# Patient Record
Sex: FEMALE | Race: WHITE
[De-identification: names, ages, dates, MRNs, and addresses within clinical notes are randomized per-mention and may not be internally consistent; named-entity substitution may affect disease eponyms.]

---

## 2019-03-03 ENCOUNTER — HOSPITAL ENCOUNTER (EMERGENCY)
Dept: HOSPITAL 75 - ER | Age: 59
Discharge: ANOTHER HOSPITAL | End: 2019-03-03
Payer: MEDICARE

## 2019-03-03 VITALS — WEIGHT: 156 LBS | BODY MASS INDEX: 25.07 KG/M2 | HEIGHT: 66 IN

## 2019-03-03 VITALS — DIASTOLIC BLOOD PRESSURE: 73 MMHG | SYSTOLIC BLOOD PRESSURE: 178 MMHG

## 2019-03-03 DIAGNOSIS — Z87.891: ICD-10-CM

## 2019-03-03 DIAGNOSIS — I50.9: ICD-10-CM

## 2019-03-03 DIAGNOSIS — N18.6: ICD-10-CM

## 2019-03-03 DIAGNOSIS — Z88.0: ICD-10-CM

## 2019-03-03 DIAGNOSIS — Z99.2: ICD-10-CM

## 2019-03-03 DIAGNOSIS — J18.9: Primary | ICD-10-CM

## 2019-03-03 LAB
ALBUMIN SERPL-MCNC: 4.4 GM/DL (ref 3.2–4.5)
ALP SERPL-CCNC: 81 U/L (ref 40–136)
ALT SERPL-CCNC: 6 U/L (ref 0–55)
APTT BLD: 30 SEC (ref 24–35)
ARTERIAL PATENCY WRIST A: POSITIVE
BASE EXCESS STD BLDA CALC-SCNC: 2.9 MMOL/L (ref -2.5–2.5)
BASOPHILS # BLD AUTO: 0 10^3/UL (ref 0–0.1)
BASOPHILS NFR BLD AUTO: 0 % (ref 0–10)
BDY SITE: (no result)
BILIRUB SERPL-MCNC: 0.6 MG/DL (ref 0.1–1)
BODY TEMPERATURE: 100.6
BUN/CREAT SERPL: 5
CALCIUM SERPL-MCNC: 10 MG/DL (ref 8.5–10.1)
CHLORIDE SERPL-SCNC: 99 MMOL/L (ref 98–107)
CO2 BLDA CALC-SCNC: 28.3 MMOL/L (ref 21–31)
CO2 SERPL-SCNC: 24 MMOL/L (ref 21–32)
CREAT SERPL-MCNC: 8.02 MG/DL (ref 0.6–1.3)
EOSINOPHIL # BLD AUTO: 0.1 10^3/UL (ref 0–0.3)
EOSINOPHIL NFR BLD AUTO: 1 % (ref 0–10)
EOSINOPHIL NFR BLD MANUAL: 3 %
ERYTHROCYTE [DISTWIDTH] IN BLOOD BY AUTOMATED COUNT: 14.9 % (ref 10–14.5)
GFR SERPLBLD BASED ON 1.73 SQ M-ARVRAT: 5 ML/MIN
GLUCOSE SERPL-MCNC: 160 MG/DL (ref 70–105)
HCT VFR BLD CALC: 33 % (ref 35–52)
HGB BLD-MCNC: 10.9 G/DL (ref 11.5–16)
INHALED O2 FLOW RATE: (no result) L/MIN
INR PPP: 1.1 (ref 0.8–1.4)
LYMPHOCYTES # BLD AUTO: 0.7 X 10^3 (ref 1–4)
LYMPHOCYTES NFR BLD AUTO: 7 % (ref 12–44)
MANUAL DIFFERENTIAL PERFORMED BLD QL: YES
MCH RBC QN AUTO: 31 PG (ref 25–34)
MCHC RBC AUTO-ENTMCNC: 33 G/DL (ref 32–36)
MCV RBC AUTO: 92 FL (ref 80–99)
MONOCYTES # BLD AUTO: 0.8 X 10^3 (ref 0–1)
MONOCYTES NFR BLD AUTO: 8 % (ref 0–12)
MONOCYTES NFR BLD: 5 %
NEUTROPHILS # BLD AUTO: 9.1 X 10^3 (ref 1.8–7.8)
NEUTROPHILS NFR BLD AUTO: 85 % (ref 42–75)
NEUTS BAND NFR BLD MANUAL: 88 %
PCO2 BLDA: 45 MMHG (ref 35–45)
PH BLDA: 7.4 [PH] (ref 7.37–7.43)
PLATELET # BLD: 156 10^3/UL (ref 130–400)
PMV BLD AUTO: 11.2 FL (ref 7.4–10.4)
PO2 BLDA: 71 MMHG (ref 79–93)
POTASSIUM SERPL-SCNC: 3.7 MMOL/L (ref 3.6–5)
PROT SERPL-MCNC: 8.1 GM/DL (ref 6.4–8.2)
PROTHROMBIN TIME: 14.7 SEC (ref 12.2–14.7)
SAO2 % BLDA FROM PO2: 95 % (ref 94–100)
SODIUM SERPL-SCNC: 141 MMOL/L (ref 135–145)
VARIANT LYMPHS NFR BLD MANUAL: 4 %
VENTILATION MODE VENT: NO
WBC # BLD AUTO: 10.7 10^3/UL (ref 4.3–11)

## 2019-03-03 PROCEDURE — 80053 COMPREHEN METABOLIC PANEL: CPT

## 2019-03-03 PROCEDURE — 94640 AIRWAY INHALATION TREATMENT: CPT

## 2019-03-03 PROCEDURE — 85007 BL SMEAR W/DIFF WBC COUNT: CPT

## 2019-03-03 PROCEDURE — 85730 THROMBOPLASTIN TIME PARTIAL: CPT

## 2019-03-03 PROCEDURE — 87804 INFLUENZA ASSAY W/OPTIC: CPT

## 2019-03-03 PROCEDURE — 84484 ASSAY OF TROPONIN QUANT: CPT

## 2019-03-03 PROCEDURE — 85027 COMPLETE CBC AUTOMATED: CPT

## 2019-03-03 PROCEDURE — 87430 STREP A AG IA: CPT

## 2019-03-03 PROCEDURE — 82805 BLOOD GASES W/O2 SATURATION: CPT

## 2019-03-03 PROCEDURE — 36415 COLL VENOUS BLD VENIPUNCTURE: CPT

## 2019-03-03 PROCEDURE — 87040 BLOOD CULTURE FOR BACTERIA: CPT

## 2019-03-03 PROCEDURE — 85610 PROTHROMBIN TIME: CPT

## 2019-03-03 PROCEDURE — 83605 ASSAY OF LACTIC ACID: CPT

## 2019-03-03 PROCEDURE — 93005 ELECTROCARDIOGRAM TRACING: CPT

## 2019-03-03 PROCEDURE — 36600 WITHDRAWAL OF ARTERIAL BLOOD: CPT

## 2019-03-03 PROCEDURE — 71045 X-RAY EXAM CHEST 1 VIEW: CPT

## 2019-03-03 NOTE — XMS REPORT
[facility], [facility].

 Created on: 2015



Ingrid Riojas

External Reference #: [id number]

: 1960

Sex: Female



Demographics







 Address  [address] [place]  [address]

 

 Home Phone  [phone]

 

 Preferred Language  Unknown

 

 Marital Status  Unknown

 

 [employer] Affiliation  Unknown

 

 Race  White

 

 Ethnic Group  Not  or 





Author







 Author  Kimi Corrales

 

 [facility]  [employer]

 

 Address  Unknown

 

 Phone  Unavailable







Care Team Providers







 Care Team Member Name  Role  Phone

 

 Kimi Corrales    Unavailable



                                                                



Allergies

          No Known Allergies                                                   
                                     



Problems

          





 Problem Type  Condition  ICD-9 Code  Onset Dates  Condition Status

 

 Problem  Diabetes Mellitus Type 2, not stated as uncontrolled  250.00     
Active

 

 Problem  Hypertension, benign  401.1     Active

 

 Problem  Hypertensive retinopathy  362.11     Active



                                                                               
                             



Medications

          No Known Medications                                                 
                                                 



Vital Signs

          





 Date/Time:  Dec 17, 2014

 

 BMI  27.10 Index

 

 Height  66.5 inches

 

 Weight  170.5 lbs

 

 Respiratory Rate  16 per Minute

 

 Blood Pressure Diastolic  82 mm Hg

 

 Blood Pressure Systolic  166 mm Hg

 

 Cardiac Monitoring Heart Rate  96 Beats per Minute



                                                                              



Results

          No Known Results                                                     
               



Summary Purpose

          eClinicalWorks Submission

## 2019-03-03 NOTE — NUR
Pt to take home PO losartan 50mg tab at this time per Dr. Hargrove. This RN 
witnessed pt taking #1 50mg losartan tab at this time.

## 2019-03-03 NOTE — XMS REPORT
[facility], [facility].

 Created on: 2014



Ingrid Riojas

External Reference #: [id number]

: 1960

Sex: Female



Demographics







 Address  [address]  [address]

 

 Home Phone  [phone]

 

 Preferred Language  Unknown

 

 Marital Status  Unknown

 

 Synagogue Affiliation  Unknown

 

 Race  White

 

 Ethnic Group  Not  or 





Author







 Author  Ann Saleh

 

 Organization  [employer]

 

 Address  Unknown

 

 Phone  Unavailable







Care Team Providers







 Care Team Member Name  Role  Phone

 

 Ann Saleh  CP  Unavailable



                                                                



Allergies

          No Known Allergies                                                   
                                     



Problems

          





 Problem Type  Condition  ICD-9 Code  Onset Dates  Condition Status

 

 Problem  Hypertension, benign  401.1     Active

 

 Problem  Diabetes Mellitus Type 2, not stated as uncontrolled  250.00     
Active



                                                                               
                   



Medications

          No Known Medications                                                 
                                                 



Vital Signs

          





 Date/Time:  2014

 

 Height  66.5 inches

 

 Weight  158.8 lbs

 

 Temperature  98.3 F

 

 Blood Pressure Diastolic  72 mm Hg

 

 Blood Pressure Systolic  160 mm Hg

 

 Cardiac Monitoring Heart Rate  112 Beats per Minute

 

 BMI  25.24 Index

 

 Respiratory Rate  16 per Minute



                                                                              



Results

          No Known Results                                                     
               



Summary Purpose

          eClinicalWorks Submission

## 2019-03-03 NOTE — XMS REPORT
[facility], [facility].

 Created on: 2015



Ingrid Riojas

External Reference #: [id number]

: 1960

Sex: Female



Demographics







 Address  [address]  [address]

 

 Home Phone  [phone]

 

 Preferred Language  Unknown

 

 Marital Status  Unknown

 

 [employer] Affiliation  Unknown

 

 Race  White

 

 Ethnic Group  Not  or 





Author







 Author  Kimi Corrales

 

 [facility]  [employer]

 

 Address  Unknown

 

 Phone  Unavailable







Care Team Providers







 Care Team Member Name  Role  Phone

 

 Kimi Corrales    Unavailable



                                                                



Allergies

          No Known Allergies                                                   
                                     



Problems

          





 Problem Type  Condition  ICD-9 Code  Onset Dates  Condition Status

 

 Problem  Diabetes Mellitus Type 2, not stated as uncontrolled  250.00     
Active

 

 Problem  Hypertension, benign  401.1     Active

 

 Problem  Hypertensive retinopathy  362.11     Active



                                                                               
                             



Medications

          





 Medication  Code System  Code  Instructions  Start Date  End Date  Status  
Dosage

 

 Bactrim DS  NDC  [address]-36  800-160 MG Orally Twice a day  Dec 23, 2014  
Dec 28, 2014  Active  1 tablet



                                                                               
                   



Vital Signs

          





 Date/Time:  Dec 17, 2014

 

 BMI  27.10 Index

 

 Height  66.5 inches

 

 Weight  170.5 lbs

 

 Respiratory Rate  16 per Minute

 

 Blood Pressure Diastolic  82 mm Hg

 

 Blood Pressure Systolic  166 mm Hg

 

 Cardiac Monitoring Heart Rate  96 Beats per Minute



                                                                              



Results

          No Known Results                                                     
               



Summary Purpose

          eClinicalWorks Submission

## 2019-03-03 NOTE — XMS REPORT
[facility], [facility].

 Created on: 2015



Ingrid Riojas

External Reference #: [id number]

: 1960

Sex: Female



Demographics







 Address  [address]  [address]

 

 Home Phone  [phone]

 

 Preferred Language  Unknown

 

 Marital Status  Unknown

 

 Amish Affiliation  Unknown

 

 Race  White

 

 Ethnic Group  Not  or 





Author







 Author  Kimi Corrales

 

 [facility]  [employer]

 

 Address  Unknown

 

 Phone  Unavailable







Care Team Providers







 Care Team Member Name  Role  Phone

 

 Kimi Corrales    Unavailable



                                                                



Allergies

          No Known Allergies                                                   
                                     



Problems

          





 Problem Type  Condition  ICD-9 Code  Onset Dates  Condition Status

 

 Problem  Diabetes Mellitus Type 2, not stated as uncontrolled  250.00     
Active

 

 Problem  Hypertension, benign  401.1     Active

 

 Problem  Hypertensive retinopathy  362.11     Active



                                                                               
                             



Medications

          No Known Medications                                                 
                                                 



Vital Signs

          





 Date/Time:  2015

 

 Height  66.5 inches

 

 Weight  146.5 lbs

 

 Temperature  98.5 F

 

 Blood Pressure Diastolic  80, sittin mm Hg

 

 Blood Pressure Systolic  supine:160 mm Hg

 

 Cardiac Monitoring Heart Rate  80 Beats per Minute

 

 BMI  23.29 Index

 

 Respiratory Rate  16 per Minute



                                                                              



Results

          No Known Results                                                     
               



Summary Purpose

          eClinicalWorks Submission

## 2019-03-03 NOTE — XMS REPORT
[facility], [facility].

 Created on: 10/20/2014



Ingrid Riojas

External Reference #: [id number]

: 1970

Sex: Female



Demographics







 Address  [address] [place]  [address]

 

 Home Phone  [phone]

 

 Preferred Language  Unknown

 

 Marital Status  Unknown

 

 Protestant Affiliation  Unknown

 

 Race  White

 

 Ethnic Group  Not  or 





Author







 Author  Ann Saleh

 

 Organization  [employer]

 

 Address  Unknown

 

 Phone  Unavailable







Care Team Providers







 Care Team Member Name  Role  Phone

 

 Ann Saleh  CP  Unavailable



                                                                



Allergies, Adverse Reactions, Alerts

          





 Substance  Reaction  Event Type

 

 Penicillin  Info Not Available  Drug Allergy



                                                                               
         



Problems

          





 Problem Type  Condition  ICD-9 Code  Onset Dates  Condition Status

 

 Assessment  Conjunctivitis  372.00     Active

 

 Assessment  Urinary tract infection, site not specified  599.0     Active



                                                                               
                   



Medications

          





 Medication  Code System  Code  Instructions  Start Date  End Date  Status  
Dosage

 

 Sulfamethoxazole-TMP DS  NDC  [address]-13  800-160 MG Orally Twice a day  
Oct 10, 2014  Oct 15, 2014  Active  1 tablet

 

 Acetaminophen  NDC  [address]-11  500 MG Orally prn        Active  1 capsule 
as needed

 

 Gentamicin Sulfate  NDC  [address]-70  0.3 % Ophthalmic every 4 hrs  Oct 10, 
2014     Active  1 drop into affected eye



                                                                               
                             



Procedures

          





 Procedure  Coding System  Code  Date

 

 OFFICE VISIT, NP-LOW COMPLEXITY (20 MIN.)  CPT-4  69081  Oct 10, 2014

 

 URINALYSIS, IN HOUSE  CPT-4  94780  Oct 10, 2014



                                                                               
                             



Vital Signs

          





 Date/Time:  Oct 10, 2014

 

 Height  66.5 inches

 

 Weight  158.5 lbs

 

 Temperature  99.1 F

 

 Blood Pressure Diastolic  78 mm Hg

 

 Blood Pressure Systolic  156 mm Hg

 

 Cardiac Monitoring Heart Rate  80 Beats per Minute

 

 BMI  25.20 Index

 

 Respiratory Rate  16 per Minute



                                                                              



Results

          No Known Results                                                     
               



Summary Purpose

          eClinicalWorks Submission

## 2019-03-03 NOTE — XMS REPORT
[facility] Continuity Of Care Document

 Created on: 2017



KIMBERLY KINCAID

External Reference #: [id number]

: 1960

Sex: Female



Demographics







 Address  [address] [place]  [address]

 

 Home Phone  [phone]

 

 Preferred Language  Unknown

 

 Marital Status  Never 

 

 Sikh Affiliation  Unknown

 

 Race  White

 

 Ethnic Group  Unknown





Author







 Author  [facility]

 

 Organization  [facility]

 

 Address  [address] [place]  [address]



 

 Phone  [phone]







Support







 Name  Relationship  Address  Phone

 

 OLIVIA KINCAID  Next Of Kin  [address] [place]  [address]  [phone]

 

 OLIVIA KINCAID  ECON  [address] [place]  [address]  [phone]







Care Team Providers







 Care Team Member Name  Role  Phone

 

 AMARI OBRIEN, LEWIS NORMAN  AT  [phone]

 

 UNASSIGNED, ED PHYSICIAN  Unavailable  Unavailable

 

 DOROTHY OBRIEN, PELON  CP  [phone]

 

 SILVA STEPHENS  PP  [phone]

 

 ADIS OBRIEN, RANULFO LUQUE  CP  [phone]

 

 CESARIO OBRIEN, CORINNA [place]  AT  [phone]







Results







 Lab Results 

 

 Visit/Account #[id number] (2017 6:04pm - 2017 6:54pm) 

 

 Test  Result  Date/Time

 

 POCGL 

 

 POCGL( MG/DL)

  90 MG/DL

  2017 6:19am











 68 MG/DL

  2017 11:15am



 

 92 MG/DL

  2017 11:59am



 

 82 MG/DL

  2017 5:51pm



 

 82 MG/DL

  2017 8:28pm



 

 90 MG/DL

  April 3, 2017 5:21am



 

 165 MG/DL

  April 3, 2017 10:58am



 

 215 MG/DL

  April 3, 2017 4:25pm



 

 212 MG/DL

  April 3, 2017 9:37pm



 

 154 MG/DL

  2017 5:03am



 

 115 MG/DL

  2017 10:06am



 

 152 MG/DL

  2017 4:51pm



 

 222 MG/DL

  2017 8:25pm



 

 93 MG/DL

  2017 6:21am



 

 157 MG/DL

  2017 10:47am



 

 147 MG/DL

  2017 4:46pm











 HEMOGLOBIN AND HEMATOCRIT 

 

 HEMOGLOBIN(12.0-16.0 G/DL)

  6.3 G/DL

  2017 11:59pm











 8.9 G/DL

  2017 12:08pm



 

 10.2 G/DL

  2017 6:09pm



 

 9.1 G/DL

  April 3, 2017 12:28am



 

 9.2 G/DL

  April 3, 2017 6:01am



 

 8.8 G/DL

  2017 5:10pm











 HEMATOCRIT(36.0-46.0 %)

  20.1 %

  2017 11:59pm











 28.2 %

  2017 12:08pm



 

 32.1 %

  2017 6:09pm



 

 28.6 %

  April 3, 2017 12:28am



 

 28.7 %

  April 3, 2017 6:01am



 

 28.1 %

  2017 5:10pm











 COMPLETE BLOOD COUNT WITH DIFF 

 

 WHITE BLOOD COUNT(4.0-11.0 10E3/UL)

  8.0 10E3/UL

  2017 6:07pm











 6.4 10E3/UL

  2017 6:19am



 

 6.4 10E3/UL

  2017 7:02am



 

 7.1 10E3/UL

  2017 7:18am











 RED BLOOD COUNT(4.00-5.20 10E6/UL)

  2.49 10E6/UL

  2017 6:07pm











 3.26 10E6/UL

  2017 6:19am



 

 2.91 10E6/UL

  2017 7:02am



 

 2.99 10E6/UL

  2017 7:18am











 HEMOGLOBIN(12.0-16.0 G/DL)

  7.4 G/DL

  2017 6:07pm











 9.5 G/DL

  2017 6:19am



 

 8.6 G/DL

  2017 7:02am



 

 8.7 G/DL

  2017 7:18am











 HEMATOCRIT(36.0-46.0 %)

  24.3 %

  2017 6:07pm











 29.8 %

  2017 6:19am



 

 26.8 %

  2017 7:02am



 

 27.4 %

  2017 7:18am











 MEAN CORPUSCULAR VOLUME(82.0-100.0 FL)

  97.6 FL

  2017 6:07pm











 91.4 FL

  2017 6:19am



 

 92.1 FL

  2017 7:02am



 

 91.6 FL

  2017 7:18am











 MEAN CORPUSCULAR HEMOGLOBIN(26.0-34.0 PG)

  29.7 PG

  2017 6:07pm











 29.1 PG

  2017 6:19am



 

 29.6 PG

  2017 7:02am



 

 29.1 PG

  2017 7:18am











 MEAN CORPUSCULAR HGB CONC(31.5-36.5 G/DL)

  30.5 G/DL

  2017 6:07pm











 31.9 G/DL

  2017 6:19am



 

 32.1 G/DL

  2017 7:02am



 

 31.8 G/DL

  2017 7:18am











 RED CELL DISTRIBUTION WIDTH(11.5-14.5 %)

  12.8 %

  2017 6:07pm











 14.1 %

  2017 6:19am



 

 13.9 %

  2017 7:02am



 

 13.7 %

  2017 7:18am











 777-3: PLATELET COUNT(150-450 10E3/UL)

  233 10E3/UL

  2017 6:07pm











 212 10E3/UL

  2017 6:19am



 

 203 10E3/UL

  2017 7:02am



 

 198 10E3/UL

  2017 7:18am











 MEAN PLATELET VOLUME(8.2-12.4 FL)

  10.4 FL

  2017 6:07pm











 10.5 FL

  2017 6:19am



 

 10.6 FL

  2017 7:02am



 

 10.3 FL

  2017 7:18am











 NEUTROPHILS % (AUTO)(40-70 %)

  62 %

  2017 6:07pm











 55 %

  2017 6:19am



 

 54 %

  2017 7:02am



 

 52 %

  2017 7:18am











 LYMPHOCYTES % (AUTO)(15-45 %)

  26 %

  2017 6:07pm











 30 %

  2017 6:19am



 

 32 %

  2017 7:02am



 

 33 %

  2017 7:18am











 MONOCYTES % (AUTO)(2-10 %)

  8 %

  2017 6:07pm











 9 %

  2017 6:19am



 

 10 %

  2017 7:02am



 

 11 %

  2017 7:18am











 EOSINOPHILS % (AUTO)(0-6 %)

  4 %

  2017 6:07pm











 5 %

  2017 6:19am



 

 3 %

  2017 7:02am



 

 4 %

  2017 7:18am











 BASOPHILS % (AUTO)(0-1 %)

  0 %

  2017 6:07pm











 1 %

  2017 6:19am



 

 0 %

  2017 7:02am



 

 0 %

  2017 7:18am











 IMMATURE GRANS % (AUTO)(0-0 %)

  0 %

  2017 6:07pm











 1 %

  2017 6:19am



 

 1 %

  2017 7:02am



 

 0 %

  2017 7:18am











 NUCLEATED RBCS (AUTO)(0-0 %)

  0 %

  2017 6:07pm











 0 %

  2017 6:19am



 

 0 %

  2017 7:02am



 

 0 %

  2017 7:18am











 NEUTROPHILS # (AUTO)(2.5-7.5 10E3/UL)

  5.0 10E3/UL

  2017 6:07pm











 3.5 10E3/UL

  2017 6:19am



 

 3.5 10E3/UL

  2017 7:02am



 

 3.7 10E3/UL

  2017 7:18am











 LYMPHOCYTES # (AUTO)(1.0-4.0 10E3/UL)

  2.1 10E3/UL

  2017 6:07pm











 1.9 10E3/UL

  2017 6:19am



 

 2.0 10E3/UL

  2017 7:02am



 

 2.3 10E3/UL

  2017 7:18am











 MONOCYTES # (AUTO)(0.2-0.8 10E3/UL)

  0.6 10E3/UL

  2017 6:07pm











 0.6 10E3/UL

  2017 6:19am



 

 0.7 10E3/UL

  2017 7:02am



 

 0.8 10E3/UL

  2017 7:18am











 EOSINOPHILS # (AUTO)(0.0-0.4 10E3/UL)

  0.3 10E3/UL

  2017 6:07pm











 0.3 10E3/UL

  2017 6:19am



 

 0.2 10E3/UL

  2017 7:02am



 

 0.3 10E3/UL

  2017 7:18am











 BASOPHILS # (AUTO)(0.0-0.2 10E3/UL)

  0.0 10E3/UL

  2017 6:07pm











 0.0 10E3/UL

  2017 6:19am



 

 0.0 10E3/UL

  2017 7:02am



 

 0.0 10E3/UL

  2017 7:18am











 IMMATURE GRANS # (AUTO)(0.0-0.0 10E3/UL)

  0.0 10E3/UL

  2017 6:07pm











 0.0 10E3/UL

  2017 6:19am



 

 0.0 10E3/UL

  2017 7:02am



 

 0.0 10E3/UL

  2017 7:18am











 DIFF TYPE 

  AUTOMATED

  2017 6:07pm











 AUTOMATED

  2017 6:19am



 

 AUTOMATED

  2017 7:02am



 

 AUTOMATED

  2017 7:18am











 ERYTHROCYTE SEDIMENTATION RATE 

 

 ERYTHROCYTE SEDIMENTATION RATE(0-20 MM/HR)

  51 MM/HR

  2017 7:02am



 

 PROTHROMBIN TIME WITH INR 

 

 PROTHROMBIN TIME(12.1-14.0 SEC)

  13.8 SEC

  2017 6:07pm











 13.2 SEC

  2017 7:02am











 31293-0: INR 

  1.06



Result Comments:



       

                INR reference interval applies to patients   

                   on anticoagulant therapy. Suggested INR   

                   therapeutic range for oral anticoagulant  

                   therapy: (Stabilized anticoagulated       

                   patients)  

  

                Routine Therapy:                  2.0 to 3.0  

                Recurrent Myocardial Infarction:  2.5 to 3.5  

                Mechanical Prosthetic Valves:     2.5 to 3.5

  2017 6:07pm











 1.00



Result Comments:



       

                INR reference interval applies to patients   

                   on anticoagulant therapy. Suggested INR   

                   therapeutic range for oral anticoagulant  

                   therapy: (Stabilized anticoagulated       

                   patients)  

  

                Routine Therapy:                  2.0 to 3.0  

                Recurrent Myocardial Infarction:  2.5 to 3.5  

                Mechanical Prosthetic Valves:     2.5 to 3.5

  2017 7:02am











 PARTIAL THROMBOPLASTIN TIME 

 

 PARTIAL THROMBOPLASTIN TIME(22.2-37.4 SEC)

  30.7 SEC

  2017 6:07pm











 23.5 SEC

  2017 7:02am











 UA WITH SCREEN FOR CULTURE 

 

 COLOR,URINE 

  LIGHT YELLOW

  2017 8:09pm



 

 CLARITY,URINE 

  CLEAR

  2017 8:09pm



 

 GLUCOSE, URINE(NEGATIVE MG/DL)

  100 MG/DL

  2017 8:09pm



 

 URINE BILIRUBIN(NEGATIVE)

  NEGATIVE

  2017 8:09pm



 

 KETONES,URINE(NEGATIVE MG/DL)

  NEGATIVE MG/DL

  2017 8:09pm



 

 URINE SPECIFIC GRAVITY(1.001-1.035)

  1.013

  2017 8:09pm



 

 URINE BLOOD(NEGATIVE)

  SMALL

  2017 8:09pm



 

 URINE PH(5.0-9.0)

  6.0

  2017 8:09pm



 

 URINE PROTEIN(Less than 20 MG/DL)

  Greater than 2000 MG/DL

  2017 8:09pm



 

 URINE UROBILINOGEN(0.2-1.0 MG/DL)

  0.2-1.0 MG/DL

  2017 8:09pm



 

 URINE NITRITE(NEGATIVE)

  NEGATIVE

  2017 8:09pm



 

 LEUKOCYTE ESTERASE ,URINE(NEGATIVE)

  NEGATIVE

  2017 8:09pm



 

 URINE RBCS(0-3 /HPF)

  8-12 /HPF

  2017 8:09pm



 

 URINE WBCS(0-3 /HPF)

  4-7 /HPF

  2017 8:09pm



 

 URINE EPITHELIAL CELLS(0-3 /HPF)

  8-12 /HPF

  2017 8:09pm



 

 URINE HYALINE CASTS(0-3 /LPF)

  4-7 /LPF

  2017 8:09pm



 

 URINE BACTERIA(NEGATIVE /HPF)

  1+ /HPF

  2017 8:09pm



 

 URINE CULTURE 

  NOT INDICATED

  2017 8:09pm



 

 URINE MICROSCOPIC REQUIRED 

  YES

  2017 8:09pm



 

 MICROALBUMIN/CREATININE RATIO 

 

 URINE CREATININE, RANDOM(MG/DL)

  50.13 MG/DL

  2017 6:05pm



 

 MICROALBUMIN RANDOM(0.0-1.8 MG/DL)

  267.0 MG/DL

  2017 6:05pm



 

 MICROALBUMIN/CRET RATIO(0.0-29.9 UG/MG)

  5326.2 UG/MG

  2017 6:05pm



 

 COMPLETE METABOLIC PROFILE 

 

 GLUCOSE( MG/DL)

  122 MG/DL

  2017 6:07pm











 95 MG/DL

  2017 6:19am



 

 138 MG/DL

  2017 7:02am



 

 89 MG/DL

  2017 7:18am











 BLOOD UREA NITROGEN(6-20 MG/DL)

  56 MG/DL

  2017 6:07pm











 54 MG/DL

  2017 6:19am



 

 54 MG/DL

  2017 7:02am



 

 55 MG/DL

  2017 7:18am











 CREATININE(0.50-1.20 MG/DL)

  3.74 MG/DL

  2017 6:07pm











 3.61 MG/DL

  2017 6:19am



 

 3.97 MG/DL

  2017 7:02am



 

 4.03 MG/DL

  2017 7:18am











 EST GLOMERULAR FILTRATION RATE(Greater than or equal to 60)

  12



Result Comments:



If the patient is of -American descent/extraction 

multiply the eGFR value by 1.212 to obtain the actual eGFR.  

  

>=60  mg/dL    Normal  

 30-59 mg/dL    Moderate Kidney Disease  

 15-29 mg/dL    Severe Kidney Disease  

<15   mg/dL    Kidney Failure

  2017 6:07pm











 13



Result Comments:



If the patient is of -American descent/extraction 

multiply the eGFR value by 1.212 to obtain the actual eGFR.  

  

>=60  mg/dL    Normal  

 30-59 mg/dL    Moderate Kidney Disease  

 15-29 mg/dL    Severe Kidney Disease  

<15   mg/dL    Kidney Failure

  2017 6:19am



 

 12



Result Comments:



If the patient is of -American descent/extraction 

multiply the eGFR value by 1.212 to obtain the actual eGFR.  

  

>=60  mg/dL    Normal  

 30-59 mg/dL    Moderate Kidney Disease  

 15-29 mg/dL    Severe Kidney Disease  

<15   mg/dL    Kidney Failure

  2017 7:02am



 

 11



Result Comments:



If the patient is of -American descent/extraction 

multiply the eGFR value by 1.212 to obtain the actual eGFR.  

  

>=60  mg/dL    Normal  

 30-59 mg/dL    Moderate Kidney Disease  

 15-29 mg/dL    Severe Kidney Disease  

<15   mg/dL    Kidney Failure

  2017 7:18am











 BUN CREATININE RATIO(10.0-20.0 RATIO)

  15.0 RATIO

  2017 6:07pm











 15.0 RATIO

  2017 6:19am



 

 14.0 RATIO

  2017 7:02am



 

 14.0 RATIO

  2017 7:18am











 SODIUM(135-145 MMOL/L)

  139 MMOL/L

  2017 6:07pm











 139 MMOL/L

  2017 6:19am



 

 137 MMOL/L

  2017 7:02am



 

 139 MMOL/L

  2017 7:18am











 POTASSIUM(3.6-5.0 MMOL/L)

  6.2 MMOL/L

  2017 6:07pm











 5.8 MMOL/L

  2017 6:19am



 

 5.3 MMOL/L

  2017 7:02am



 

 5.1 MMOL/L

  2017 7:18am











 CHLORIDE(101-111 MMOL/L)

  116 MMOL/L

  2017 6:07pm











 112 MMOL/L

  2017 6:19am



 

 110 MMOL/L

  2017 7:02am



 

 111 MMOL/L

  2017 7:18am











 CO2(21-31 MMOL/L)

  18 MMOL/L

  2017 6:07pm











 18 MMOL/L

  2017 6:19am



 

 20 MMOL/L

  2017 7:02am



 

 19 MMOL/L

  2017 7:18am











 ANION GAP(8-18)

  11

  2017 6:07pm











 15

  2017 6:19am



 

 12

  2017 7:02am



 

 14

  2017 7:18am











 OSMO CALCULATED(270.0-290.0)

  294.3

  2017 6:07pm











 292.1

  2017 6:19am



 

 290.8

  2017 7:02am



 

 292.1

  2017 7:18am











 CALCIUM(8.5-10.5 MG/DL)

  8.2 MG/DL

  2017 6:07pm











 8.4 MG/DL

  2017 6:19am



 

 8.0 MG/DL

  2017 7:02am



 

 8.0 MG/DL

  2017 7:18am











 BILIRUBIN,TOTAL(0.1-1.2 MG/DL)

  0.3 MG/DL

  2017 6:07pm











 0.7 MG/DL

  2017 6:19am



 

 0.4 MG/DL

  2017 7:02am



 

 0.4 MG/DL

  2017 7:18am











 ALKALINE PHOSPHATASE( IU/L)

  151 IU/L

  2017 6:07pm











 138 IU/L

  2017 6:19am



 

 96 IU/L

  2017 7:02am



 

 87 IU/L

  2017 7:18am











 ASPARTATE AMINO TRANSFERASE(10-42 IU/L)

  20 IU/L

  2017 6:07pm











 14 IU/L

  2017 6:19am



 

 10 IU/L

  2017 7:02am



 

 9 IU/L

  2017 7:18am











 ALANINE AMINOTRANSFERASE(10-60 IU/L)

  29 IU/L

  2017 6:07pm











 24 IU/L

  2017 6:19am



 

 13 IU/L

  2017 7:02am



 

 12 IU/L

  2017 7:18am











 TOTAL PROTEIN(6.4-8.2 G/DL)

  6.9 G/DL

  2017 6:07pm











 6.5 G/DL

  2017 6:19am



 

 5.9 G/DL

  2017 7:02am



 

 5.9 G/DL

  2017 7:18am











 ALBUMIN(3.5-5.5 G/DL)

  3.0 G/DL

  2017 6:07pm











 2.9 G/DL

  2017 6:19am



 

 2.7 G/DL

  2017 7:02am



 

 2.8 G/DL

  2017 7:18am











 GLOBULIN(2.4-3.6)

  3.9

  2017 6:07pm











 3.6

  2017 6:19am



 

 3.2

  2017 7:02am



 

 3.1

  2017 7:18am











 ALBUMIN/GLOBULIN RATIO(0.9-1.8 RATIO)

  0.8 RATIO

  2017 6:07pm











 0.8 RATIO

  2017 6:19am



 

 0.8 RATIO

  2017 7:02am



 

 0.9 RATIO

  2017 7:18am











 BASIC METABOLIC PANEL 

 

 GLUCOSE( MG/DL)

  106 MG/DL

  2017 8:01pm











 65 MG/DL

  2017 10:08pm



 

 217 MG/DL

  2017 2:22am



 

 60 MG/DL

  2017 9:54am



 

 99 MG/DL

  2017 1:52pm



 

 83 MG/DL

  2017 6:09pm



 

 196 MG/DL

  2017 9:47pm



 

 108 MG/DL

  April 3, 2017 12:28am



 

 90 MG/DL

  April 3, 2017 6:01am











 BLOOD UREA NITROGEN(6-20 MG/DL)

  56 MG/DL

  2017 8:01pm











 55 MG/DL

  2017 10:08pm



 

 55 MG/DL

  2017 2:22am



 

 54 MG/DL

  2017 9:54am



 

 56 MG/DL

  2017 1:52pm



 

 52 MG/DL

  2017 6:09pm



 

 53 MG/DL

  2017 9:47pm



 

 53 MG/DL

  April 3, 2017 12:28am



 

 51 MG/DL

  April 3, 2017 6:01am











 CREATININE(0.50-1.20 MG/DL)

  3.77 MG/DL

  2017 8:01pm











 3.71 MG/DL

  2017 10:08pm



 

 3.77 MG/DL

  2017 2:22am



 

 3.69 MG/DL

  2017 9:54am



 

 3.80 MG/DL

  2017 1:52pm



 

 3.73 MG/DL

  2017 6:09pm



 

 3.73 MG/DL

  2017 9:47pm



 

 3.68 MG/DL

  April 3, 2017 12:28am



 

 3.73 MG/DL

  April 3, 2017 6:01am











 EST GLOMERULAR FILTRATION RATE(Greater than or equal to 60)

  12



Result Comments:



If the patient is of -American descent/extraction 

multiply the eGFR value by 1.212 to obtain the actual eGFR.  

  

>=60  mg/dL    Normal  

 30-59 mg/dL    Moderate Kidney Disease  

 15-29 mg/dL    Severe Kidney Disease  

<15   mg/dL    Kidney Failure

  2017 8:01pm











 13



Result Comments:



If the patient is of -American descent/extraction 

multiply the eGFR value by 1.212 to obtain the actual eGFR.  

  

>=60  mg/dL    Normal  

 30-59 mg/dL    Moderate Kidney Disease  

 15-29 mg/dL    Severe Kidney Disease  

<15   mg/dL    Kidney Failure

  2017 10:08pm



 

 12



Result Comments:



If the patient is of -American descent/extraction 

multiply the eGFR value by 1.212 to obtain the actual eGFR.  

  

>=60  mg/dL    Normal  

 30-59 mg/dL    Moderate Kidney Disease  

 15-29 mg/dL    Severe Kidney Disease  

<15   mg/dL    Kidney Failure

  2017 2:22am



 

 13



Result Comments:



If the patient is of -American descent/extraction 

multiply the eGFR value by 1.212 to obtain the actual eGFR.  

  

>=60  mg/dL    Normal  

 30-59 mg/dL    Moderate Kidney Disease  

 15-29 mg/dL    Severe Kidney Disease  

<15   mg/dL    Kidney Failure

  2017 9:54am



 

 12



Result Comments:



If the patient is of -American descent/extraction 

multiply the eGFR value by 1.212 to obtain the actual eGFR.  

  

>=60  mg/dL    Normal  

 30-59 mg/dL    Moderate Kidney Disease  

 15-29 mg/dL    Severe Kidney Disease  

<15   mg/dL    Kidney Failure

  2017 1:52pm



 

 13



Result Comments:



If the patient is of -American descent/extraction 

multiply the eGFR value by 1.212 to obtain the actual eGFR.  

  

>=60  mg/dL    Normal  

 30-59 mg/dL    Moderate Kidney Disease  

 15-29 mg/dL    Severe Kidney Disease  

<15   mg/dL    Kidney Failure

  2017 6:09pm



 

 13



Result Comments:



If the patient is of -American descent/extraction 

multiply the eGFR value by 1.212 to obtain the actual eGFR.  

  

>=60  mg/dL    Normal  

 30-59 mg/dL    Moderate Kidney Disease  

 15-29 mg/dL    Severe Kidney Disease  

<15   mg/dL    Kidney Failure

  2017 9:47pm



 

 13



Result Comments:



If the patient is of -American descent/extraction 

multiply the eGFR value by 1.212 to obtain the actual eGFR.  

  

>=60  mg/dL    Normal  

 30-59 mg/dL    Moderate Kidney Disease  

 15-29 mg/dL    Severe Kidney Disease  

<15   mg/dL    Kidney Failure

  April 3, 2017 12:28am



 

 13



Result Comments:



If the patient is of -American descent/extraction 

multiply the eGFR value by 1.212 to obtain the actual eGFR.  

  

>=60  mg/dL    Normal  

 30-59 mg/dL    Moderate Kidney Disease  

 15-29 mg/dL    Severe Kidney Disease  

<15   mg/dL    Kidney Failure

  April 3, 2017 6:01am











 BUN CREATININE RATIO(10.0-20.0 RATIO)

  15.0 RATIO

  2017 8:01pm











 15.0 RATIO

  2017 10:08pm



 

 15.0 RATIO

  2017 2:22am



 

 15.0 RATIO

  2017 9:54am



 

 15.0 RATIO

  2017 1:52pm



 

 14.0 RATIO

  2017 6:09pm



 

 14.0 RATIO

  2017 9:47pm



 

 14.0 RATIO

  April 3, 2017 12:28am



 

 14.0 RATIO

  April 3, 2017 6:01am











 SODIUM(135-145 MMOL/L)

  140 MMOL/L

  2017 8:01pm











 141 MMOL/L

  2017 10:08pm



 

 139 MMOL/L

  2017 2:22am



 

 140 MMOL/L

  2017 9:54am



 

 139 MMOL/L

  2017 1:52pm



 

 139 MMOL/L

  2017 6:09pm



 

 137 MMOL/L

  2017 9:47pm



 

 137 MMOL/L

  April 3, 2017 12:28am



 

 139 MMOL/L

  April 3, 2017 6:01am











 POTASSIUM(3.6-5.0 MMOL/L)

  5.8 MMOL/L

  2017 8:01pm











 6.2 MMOL/L

  2017 10:08pm



 

 6.1 MMOL/L

  2017 2:22am



 

 5.8 MMOL/L

  2017 9:54am



 

 6.1 MMOL/L

  2017 1:52pm



 

 6.1 MMOL/L

  2017 6:09pm



 

 5.6 MMOL/L

  2017 9:47pm



 

 5.9 MMOL/L

  April 3, 2017 12:28am



 

 5.7 MMOL/L

  April 3, 2017 6:01am











 CHLORIDE(101-111 MMOL/L)

  114 MMOL/L

  2017 8:01pm











 118 MMOL/L

  2017 10:08pm



 

 116 MMOL/L

  2017 2:22am



 

 115 MMOL/L

  2017 9:54am



 

 112 MMOL/L

  2017 1:52pm



 

 109 MMOL/L

  2017 6:09pm



 

 110 MMOL/L

  2017 9:47pm



 

 112 MMOL/L

  April 3, 2017 12:28am



 

 111 MMOL/L

  April 3, 2017 6:01am











 CO2(21-31 MMOL/L)

  19 MMOL/L

  2017 8:01pm











 19 MMOL/L

  2017 10:08pm



 

 17 MMOL/L

  2017 2:22am



 

 18 MMOL/L

  2017 9:54am



 

 18 MMOL/L

  2017 1:52pm



 

 18 MMOL/L

  2017 6:09pm



 

 18 MMOL/L

  2017 9:47pm



 

 19 MMOL/L

  April 3, 2017 12:28am



 

 19 MMOL/L

  April 3, 2017 6:01am











 ANION GAP(8-18)

  13

  2017 8:01pm











 10

  2017 10:08pm



 

 12

  2017 2:22am



 

 13

  2017 9:54am



 

 15

  2017 1:52pm



 

 18

  2017 6:09pm



 

 15

  2017 9:47pm



 

 12

  April 3, 2017 12:28am



 

 15

  April 3, 2017 6:01am











 OSMO CALCULATED(270.0-290.0)

  295.3

  2017 8:01pm











 294.5

  2017 10:08pm



 

 299.2

  2017 2:22am



 

 292.0

  2017 9:54am



 

 293.0

  2017 1:52pm



 

 290.7

  2017 6:09pm



 

 293.6

  2017 9:47pm



 

 288.7

  April 3, 2017 12:28am



 

 290.8

  April 3, 2017 6:01am











 CALCIUM(8.5-10.5 MG/DL)

  8.5 MG/DL

  2017 8:01pm











 8.1 MG/DL

  2017 10:08pm



 

 8.2 MG/DL

  2017 2:22am



 

 8.3 MG/DL

  2017 9:54am



 

 8.3 MG/DL

  2017 1:52pm



 

 8.6 MG/DL

  2017 6:09pm



 

 8.5 MG/DL

  2017 9:47pm



 

 8.3 MG/DL

  April 3, 2017 12:28am



 

 8.4 MG/DL

  April 3, 2017 6:01am











 LACTATE DEHYDROGENASE 

 

 LACTATE DEHYDROGENASE( IU/L)

  182 IU/L

  2017 7:02am



 

 CARDIAC TROPONIN I 

 

 CARDIAC TROPONIN I(0.01-0.04 NG/ML)

  0.03 NG/ML



Result Comments:



REFERENCE RANGES:  

    NEGATIVE                             < 0.04 NG/ML  

    POSSIBLE MYCARDIAL INVOLVEMENT     >/=0.04 NG/ML   

  

INTERPRET TROPONIN I RESULT IN LIGHT OF THE TOTAL CLINICAL 

PRESENTATION INCLUDING CLINICAL HISTORY.  ANY CONDITION 

RESULTING IN MYOCARDIAL INJURY CAN POTENTIALLY ELEVATE 

TROPONIN I LEVELS ABOVE EXPECTED NORMAL RANGES.  

  

** NOTE NEW REFERENCE RANGE **

  2017 6:07pm











 0.02 NG/ML



Result Comments:



REFERENCE RANGES:  

    NEGATIVE                             < 0.04 NG/ML  

    POSSIBLE MYCARDIAL INVOLVEMENT     >/=0.04 NG/ML   

  

INTERPRET TROPONIN I RESULT IN LIGHT OF THE TOTAL CLINICAL 

PRESENTATION INCLUDING CLINICAL HISTORY.  ANY CONDITION 

RESULTING IN MYOCARDIAL INJURY CAN POTENTIALLY ELEVATE 

TROPONIN I LEVELS ABOVE EXPECTED NORMAL RANGES.  

  

** NOTE NEW REFERENCE RANGE **

  2017 11:59pm



 

 0.03 NG/ML



Result Comments:



REFERENCE RANGES:  

    NEGATIVE                             < 0.04 NG/ML  

    POSSIBLE MYCARDIAL INVOLVEMENT     >/=0.04 NG/ML   

  

INTERPRET TROPONIN I RESULT IN LIGHT OF THE TOTAL CLINICAL 

PRESENTATION INCLUDING CLINICAL HISTORY.  ANY CONDITION 

RESULTING IN MYOCARDIAL INJURY CAN POTENTIALLY ELEVATE 

TROPONIN I LEVELS ABOVE EXPECTED NORMAL RANGES.  

  

** NOTE NEW REFERENCE RANGE **

  2017 6:19am











 BETA NATRIURETIC PEPTIDE 

 

 BETA NATRIURETIC PEPTIDE(0-100 PG/ML)

  671 PG/ML

  2017 6:07pm



 

 IRON PROFILE 

 

 IRON( UG/DL)

  41 UG/DL

  2017 7:02am



 

 IRON BINDING CAPACITY, TOTAL(250-400 UG/DL)

  218 UG/DL

  2017 7:02am



 

 TRANSFERRIN(192-382 MG/DL)

  156 MG/DL

  2017 7:02am



 

 IRON SATURATION(20-50 %)

  19 %

  2017 7:02am



 

 LACTIC ACID 

 

 LACTIC ACID(0.5-2.0 MMOL/L)

  1.0 MMOL/L

  2017 7:02am



 

 FERRITIN 

 

 FERRITIN(11.0-306.8 NG/ML)

  130.1 NG/ML

  2017 7:02am



 

 COMPLEMENT C3 

 

 COMPLEMENT C3( mg/dL)

  113 mg/dL



Result Comments:



Performed at:   - [facility] [address], [place], [place]  [id number]  

: YANIRA Laureano MD, Phone:  [phone]  

  2017 7:02am



 

 COMPLEMENT C4 

 

 COMPLEMENT C4(14-44 mg/dL)

  24 mg/dL

  2017 7:02am



 

 FREE KAPPA & LAMDA LIGHT CHAIN LAMDA LIGHT CHAIN 

 

 FREE KAPPA LIGHT CHAIN(3.30-19.40 mg/L)

  136.05 mg/L

  2017 7:02am



 

 FREE LAMBDA LIGHT CHAIN(5.71-26.30 mg/L)

  87.74 mg/L

  2017 7:02am



 

 KAPPA/LAMBDA RATIO(0.26-1.65)

  1.55

  2017 7:02am



 

 ALEENA & PE SERUM PE SERUM 

 

 PROTEIN, TOTAL(6.0-8.5 g/dL)

  5.4 g/dL

  2017 7:02am



 

 ALBUMIN(2.9-4.4 g/dL)

  2.5 g/dL

  2017 7:02am



 

 ALPHA-1-GLOBULIN(0.0-0.4 g/dL)

  0.2 g/dL

  2017 7:02am



 

 ALPHA-2-GLOBULIN(0.4-1.0 g/dL)

  0.7 g/dL

  2017 7:02am



 

 BETA GLOBULIN(0.7-1.3 g/dL)

  0.9 g/dL

  2017 7:02am



 

 GAMMA GLOBULIN(0.4-1.8 g/dL)

  1.1 g/dL

  2017 7:02am



 

 M-SPIKE(Not Observed g/dL)

  Not Observed g/dL

  2017 7:02am



 

 GLOBULIN, TOTAL(2.2-3.9 g/dL)

  2.9 g/dL

  2017 7:02am



 

 A/G RATIO(0.7-1.7)

  0.9

  2017 7:02am



 

 IMMUNOGLOBULIN G(700-1600 mg/dL)

  998 mg/dL

  2017 7:02am



 

 IMMUNOGLOBULIN A( mg/dL)

  247 mg/dL

  2017 7:02am



 

 IMMUNOGLOBULIN M( mg/dL)

  63 mg/dL

  2017 7:02am



 

 ALEENA & PE, RANDOM URINE PE, RANDOM URINE 

 

 PROTEIN,URINE TOTAL(Not Estab. mg/dL)

  640.6 mg/dL



Result Comments:



Results confirmed on  

dilution.  

  April 3, 2017 10:25pm



 

 ALBUMIN, URINE(. %)

  56.4 %

  April 3, 2017 10:25pm



 

 ALPHA-1-GLOBULIN URINE(. %)

  2.5 %

  April 3, 2017 10:25pm



 

 ALPHA-2-GLOBULIN URINE(. %)

  10.2 %

  April 3, 2017 10:25pm



 

 BETA GLOBULIN, URINE(. %)

  13.3 %

  April 3, 2017 10:25pm



 

 GAMMA GLOBULIN, URINE(. %)

  17.6 %

  April 3, 2017 10:25pm



 

 M-SPIKE,%(Not Observed %)

  Not Observed %

  April 3, 2017 10:25pm



 

 EAMON W/REFLEX 

 

 ANINUCLEAR ANTIBODIES DIRECT(Negative)

  Negative



Result Comments:



Performed at:  [facility]  [id number]  

: YANIRA Laureano MD, Phone:  [phone]  

  2017 7:02am



 

 ANTI DNA DS ANTIBODY 

 

 ANTI DNA DS ANTIBODY(0-9 IU/mL)

  1 IU/mL



Result Comments:



                                   Negative      <5  

                                   Equivocal  5 - 9  

                                   Positive      >9  

Performed at:  [address]  [id number]  

: YANIRA Laureano MD, Phone:  [phone]  

  2017 7:02am



 

 ANCA PANEL 

 

 ANTIMYEOLOPEROXIDASE AB(0.0-9.0 U/mL)

  Less than 9.0 U/mL

  2017 7:02am



 

 ANTIPROTEINASE 3 ANTIBODY(0.0-3.5 U/mL)

  Less than 3.5 U/mL

  2017 7:02am



 

 CYTOPLASMIC (C-ANCA)(Neg:<1:20 titer)

  Less than 1:20 titer

  2017 7:02am



 

 PERINUCLEAR (P-ANCA)(Neg:<1:20 titer)

  Less than 1:20 titer



Result Comments:



The presence of positive fluorescence exhibiting P-ANCA or  

C-ANCA patterns alone is not specific for the diagnosis of  

Wegener's Granulomatosis (WG) or microscopic polyangiitis.  

Decisions about treatment should not be based solely on  

ANCA IFA results.  The [employer] Consensus  

recommends follow up testing of positive sera with both CT-  

 3 and MPO-ANCA enzyme immunoassays. As many as 5% serum  

samples are positive only by EIA. Ref. AM J Clin Pathol  

 1999;111:507-513.  

  2017 7:02am



 

 ATYPICAL pANCA(Neg:<1:20 titer)

  Less than 1:20 titer



Result Comments:



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  

significant percentage of patients with ulcerative colitis,  

primary sclerosing cholangitis and autoimmune hepatitis.  

Performed at:  [facility]  [id number]  

: Kendall Garcias MD, Phone:  [phone]  

  2017 7:02am













 Microbiology Results 

 

 Visit/Account #[id number] (2017 6:04pm - 2017 6:54pm) 

 

 Procedure  Result

 

 HEMOCCULT 

 



OCCULT BLOOD



 

Result Instance On 2017 3:45pm 

Source: STOOL





Result Prompts: 



OCCULT BLOOD                  NEGATIVE 













 Bloodbank Results 

 

 Visit/Account #[id number] (2017 6:04pm - 2017 6:54pm) 

 

 Test  Result

 

 ABO/RH 

 



BLOOD TYPE



  B NEGATIVE on 2017 11:55pm



 

 ANTIBODY SCREEN 

 



ANTIBODY SCREEN



  NEGATIVE on 2017 11:55pm











Allergies and Adverse Reactions







 Allergies and Adverse Reactions 

 

 Patient Unit Number: [id number] 









 Agent  Type  Reaction  Severity  Status

 

 PENICILLINS

  Drug Allergy

  RASH

  Moderate

  Active



 

 CODEINE

  Drug Allergy

  RASH

  Mild

  Active









Problem List







 Problem List 

 

 Visit/Account #[id number] (2017 6:04pm - 2017 6:54pm) 

 

 Acute Problems:  

 

 Code/Condition  Comments  Documented Start Date  Documented Resolved Date  Code
(s)

 

  

CHF exacerbation

   

   

   

   

ICD10: I50.9 CHF exacerbation

SNOMED: 75116467 Acute exacerbation of congestive heart failure



 

  

Acute on chronic renal failure

   

   

   

   

ICD10: N17.9 Acute on chronic renal failure

SNOMED: 888868315 Acute-on-chronic renal failure



 

 Patient Unit Number: [id number] 

 

 Chronic Problems:  

 

 Code/Condition  Comments  Documented Start Date  Documented Resolved Date  Code
(s)

 

  

New onset type 1 diabetes mellitus, uncontrolled

   

   

   

   

ICD9: 250.03 New onset type 1 diabetes mellitus, uncontrolled

SNOMED: 319807485 New onset type 1 diabetes mellitus, uncontrolled



 

  

Hypertension

   

   

   

   

ICD9: 401.9 Hypertension

SNOMED: 20119605 Hypertension



 

  

Anemia, chronic disease

   

   

   

   

ICD10: D63.8 Anemia, chronic disease

SNOMED: 045511242 Chronic disease anemia



 

  

Anemia

   

   

   

   

ICD10: D64.9 Anemia

SNOMED: 261288975 Anemia



 

  

CHF (congestive heart failure)

   

   

   

   

ICD10: I50.9 CHF (congestive heart failure)

SNOMED: 30419491 CHF (congestive heart failure)



 

  

Diabetes mellitus

   

   

   

   

ICD10: E11.9 Diabetes mellitus

SNOMED: 24860780 Diabetes mellitus



 

  

Depression

   

   

   

   

ICD10: F32.9 Depression

SNOMED: 57605434 Depression









Plan of Care







 Plan Of Care 

 

 Visit/Account #[id number] (2017 6:04pm - 2017 6:54pm) 

 

 Patient Instructions 

 

 Instructions

 

  DI for Hyperkalemia

Hydralazine

Carvedilol

 









Vital Signs







 Vital Signs 

 

 Visit/Account #[id number] (2017 6:04pm - 2017 6:54pm) 

 

 Sign  First Result  Last Result  Code(s)

 

 Blood Pressure

 

  156/ 76 mm[Hg] On 2017 9:40pm

  153/ 73 mm[Hg] On 2017 1:17pm

  8462-4  BP Diastolic

 8480-6  BP Systolic



 

 Heart Rate/Pulse

 

  Pulse Rate (adult):  77 /min On 2017 9:40pm

  Pulse Rate (adult):  81 /min On 2017 1:17pm

  8867-4  Heart Rate



 

 Respiratory Rate

 

  Respiratory Rate:  20 /min On 2017 9:40pm

  Respiratory Rate:  18 /min On 2017 1:17pm

  9279-1  Respiratory Rate



 

 Temperature in Fahrenheit

 

  Temperature (Fahrenheit):  98.6 [degF] On 2017 6:03pm

  Temperature (Fahrenheit):  98.0 [degF] On 2017 1:17pm

  8310-5  Body Temperature



 

 Weight in Kilograms

 

  Weight (Kilograms):  80.0000 kg On 2017 6:03pm

   

  3141-9  Weight Measured









Functional Status







 Functional and Cognitive Status 









 No Functional Status Data









Medications







 Home Medications - Medications that the patient was taking prior to arrival at 
the hospital 

 

 Visit/Account #[id number] (2017 6:04pm - 2017 6:54pm) 

 

 Medication  Route  Sig/Schedule  Precondition/Indication  Comments/Instructions
  Codes

 

 LEVEMIR FLEXTOUCH(INSULIN DETEMIR) 100 UNIT/1 ML INSULN.PEN

Dose: 12 UNIT

 

  SUBCUTANEOUSLY

  AT BEDTIME

   

  Rx Note Text Comments:

*RX HAS NOT FILLED, PT STATES SHE TAKES*

 

  3 ML insulin detemir 100 UNT/ML Pen Injector [Levemir] (RxNorm): 431956

 

LEVEMIR FLEXTOUCH (INSULIN DETEMIR) NDC: 80136314206

 



 

 TYLENOL(ACETAMINOPHEN) 500 MG TABLET

Dose: 500 MG

 

  ORAL

  Q4H

  PAIN OR FEVER

   

  TYLENOL (ACETAMINOPHEN) NDC: 13830740883

 



 

 Aspirin Ec(ASPIRIN) 81 MG TAB

Dose: 81 MG

 

  ORAL

  DAILY

   

   

  Aspirin 81 MG Delayed Release Oral Tablet (RxNorm): 549292

 

Aspirin Ec (ASPIRIN) NDC: 64867525874

 



 

 Crestor(ROSUVASTATIN CALCIUM) 20 MG TAB

Dose: 20 MG

 

  ORAL

  DAILY

   

   

  Rosuvastatin calcium 20 MG Oral Tablet [Crestor] (RxNorm): 527915

 

Crestor (ROSUVASTATIN CALCIUM) NDC: 98453092759

 



 

 CARVEDILOL(CARVEDILOL) 12.5 MG TABLET

Dose: 12.5 MG

 

  ORAL

  TWICE A DAY

   

   

  carvedilol 12.5 MG Oral Tablet (RxNorm): 508528

 

CARVEDILOL (CARVEDILOL) NDC: 04511332821

 



 

 SPIRONOLACTONE(SPIRONOLACTONE) 25 MG TABLET

Dose: 100 MG

 

  ORAL

  DAILY

   

   

  Spironolactone 25 MG Oral Tablet (RxNorm): 530602

 

SPIRONOLACTONE (SPIRONOLACTONE) NDC: 20536326237

 



 

 K-DUR(POTASSIUM CHLORIDE) 20 MEQ TAB.ER.PRT

Dose: 20 MEQ

 

  ORAL

  DAILY

   

  Rx Instructions:

*PER DR OFFICE LIST. UNABLE TO VERIFY FILLED*

 

  Microencapsulated Potassium Chloride 20 MEQ Extended Release Oral Tablet (
RxNorm): 5724614

 

K-DUR (POTASSIUM CHLORIDE) NDC: 69565067303

 



 

 Metoclopramide(METOCLOPRAMIDE HCL) 10 MG TAB

Dose: 10 MG

 

  ORAL

  3 TIMES DAILY WITH MEALS

   

  Rx Instructions:

Take 1 tablet with meals three times daily. 

 

  Metoclopramide (METOCLOPRAMIDE HCL) NDC: 09403733332

 



 

 PROTONIX(PANTOPRAZOLE) 40 MG TAB

Dose: 40 MG

 

  ORAL

  BID@,17

   

  Rx Instructions:

Take one tablet twice daily. 

 

  pantoprazole 40 MG Delayed Release Oral Tablet [Protonix] (RxNorm): 195313

 

PROTONIX (PANTOPRAZOLE) NDC: 19956624605

 



 

 NORVASC(AmLODIpine BESYLATE) 10 MG TAB

Dose: 10 MG

 

  ORAL

  DAILY

   

   

  Amlodipine 10 MG Oral Tablet [Norvasc] (RxNorm): 879949

 

NORVASC (AmLODIpine BESYLATE) NDC: 63873152817

 



 

 NORVASC(AmLODIpine BESYLATE) 10 MG TAB

Dose: 10 MG

 

  ORAL

  DAILY

   

   

  Amlodipine 10 MG Oral Tablet [Norvasc] (RxNorm): 318977

 

NORVASC (AmLODIpine BESYLATE) NDC: 72125398984

 



 

 REGLAN(METOCLOPRAMIDE HCL) 10 MG TABLET

Dose: 10 MG

 

  ORAL

  3 TIMES DAILY WITH MEALS

   

   

  Metoclopramide 10 MG Oral Tablet [Reglan] (RxNorm): 850894

 

REGLAN (METOCLOPRAMIDE HCL) NDC: 31698867705

 



 

 PROTONIX(PANTOPRAZOLE) 40 MG TAB

Dose: 40 MG

 

  ORAL

  TWICE A DAY

   

   

  pantoprazole 40 MG Delayed Release Oral Tablet [Protonix] (RxNorm): 988468

 

PROTONIX (PANTOPRAZOLE) NDC: 72209253938

 













 Inpatient/Ordered Medications - Medications administered during hospital visit 

 

 Visit/Account #[id number] (2017 6:04pm - 2017 6:54pm) 

 

 Medication  Route  Sig/Schedule  Precondition/Indication  Comments/Instructions
  Codes

 

 ASPIRIN 324 MG TAB

Dose: 324 MG

 

  ORAL

  NOW

   

  Label Comments:

Chewed if no intolerance to aspirin and not 

aspirin taken today

 

  Aspirin 81 MG Chewable Tablet (RxNorm): 097862

 

 (ASPIRIN) NDC: 22530982621

 



 

 NITROQUICK(NITROGLYCERIN) 0.4 MG/TAB TAB

Dose: 1 TAB

 

  SUBLINGUAL

  NOW

   

  Label Comments:

*DOSE 1*

**Do NOT give until approved by physician.**

Give every 3-5 minutes if needed for ongoing 

symptons. Max of 3 doses.

Do NOT give unless:

   Heart rate  beats per minute

   SBP is greater than 90 mmHg and/or no lower 

than 20 mmHg below baseline

Do NOT give if:

   inferior MI or RV infarction

   recent phosphodesterase inhibito use (e.g. 

Viagra, Levitra, Revatio) within last 24 hours or 

Cialis within last 48 hours.

 

  Nitroglycerin 0.4 MG Sublingual Tablet [Nitrostat] (RxNorm): 853724

 

NITROQUICK (NITROGLYCERIN) NDC: 31787076173

 



 

 SODIUM BICARBONATE 50 MEQ/50 ML INJECTION

Dose: 50 ML

 

  INTRAVEN

  NOW

   

  Special Dose Instructions:

 50 MEQ=1 AMP

 

  50 ML Sodium Bicarbonate 1 MEQ/ML Prefilled Syringe (RxNorm): 168485

 

 (SODIUM BICARBONATE) NDC: 57896775340

 



 

 CALCIUM GLUCONATE 1 GM/10 ML INJECTION

Dose: 10 ML

 

  INTRAVEN

  NOW

   

  Label Comments:

 4.65 MEQ=1 GM

 

Special Dose Instructions:

PUSH OVER 10 MINUTES

 

  10 ML Calcium Gluconate 100 MG/ML Injection (RxNorm): 5291001

 

 (CALCIUM GLUCONATE) NDC: 39065161580

 



 

 DEXTROSE 25 G/50 ML INJECTION

Dose: 50 ML

 

  INTRAVEN

  NOW

   

   

  50 ML Glucose 500 MG/ML Prefilled Syringe (RxNorm): 444181

 

 (DEXTROSE) NDC: 42207666471

 



 

 HumuLIN-R (ER/AMBS PYX)(INSULIN HUM R) 100 UNIT/ML INJECTION

Dose: 0.1 ML

 

  INTRAVEN

  NOW

   

  Label Comments:

GIVE 30 MINUTES BEFORE A MEAL

DOCUMENT SITE OF INJECTION

 

  Regular Insulin, Human 100 UNT/ML Injectable Solution [Novolin R] (RxNorm): 
299339

 

HumuLIN-R (ER/AMBS PYX) (INSULIN HUM R) NDC: 80154203481

 



 

 TYLENOL(ACETAMINOPHEN) 325 MG TAB

Dose: 650 MG

 

  ORAL

  Q6H

  PRN Reason: PAIN OR FEVER

  Label Comments:

Do not exceed 4000 mg of total acetaminophen per 

 24 hours

 

  Acetaminophen 325 MG Oral Tablet (RxNorm): 566689

 

TYLENOL (ACETAMINOPHEN) NDC: 37209718991

 



 

 PROTONIX INJ(PANTOPRAZOLE) 40 MG INJECTION

Dose: 40 MG

 

  INTRAVEN

  DAILY@06

   

  Rx Order Comments:

Order filed UNV:

Dose Warnings differ from 

 

Label Comments:

DILUTE WITH 10 ML OF NORMAL SALINE.

GIVE OVER 2 MINUTES.

 

  pantoprazole 40 MG Injection [Protonix] (RxNorm): 216297

 

PROTONIX INJ (PANTOPRAZOLE) NDC: 98152890417

 



 

 PROTONIX INJ(PANTOPRAZOLE) 40 MG INJECTION

Dose: 40 MG

 

  INTRAVEN

  NOW

   

  Rx Order Comments:

Order filed UNV:

Allergies/Duplicates/Interactions differ from 

Dose Warnings differ from 

 

Label Comments:

DILUTE WITH 10 ML OF NORMAL SALINE.

GIVE OVER 2 MINUTES.

 

  pantoprazole 40 MG Injection [Protonix] (RxNorm): 023070

 

PROTONIX INJ (PANTOPRAZOLE) NDC: 55061688853

 



 

 COREG(CARVEDILOL) 12.5 MG TAB

Dose: 12.5 MG

 

  ORAL

  WITH BREAKFAST & SUPPER

   

  Label Comments:

MAY INCREASE FALL RISK

TAKE WITH FOOD

 

  carvedilol 12.5 MG Oral Tablet [Coreg] (RxNorm): 904478

 

COREG (CARVEDILOL) NDC: 73299588333

 



 

 HumuLIN R(INSULIN HUM REG) 300 UNIT/3 ML INJECTION

Dose: 0.1 ML

 

  SUBCUTANEOUSLY

  NOW

   

  Label Comments:

GIVE 30 MINUTES BEFORE A MEAL

 

  Regular Insulin, Human 100 UNT/ML Injectable Solution [Humulin R] (RxNorm): 
666507

 

HumuLIN R (INSULIN HUM REG) NDC: 32360627852

 



 

 KAYEXALATE(SODIUM POLYSTYRENE SULFONATE) 15 GM/60 ML SUSPENSION

Dose: 60 ML

 

  ORAL

  NOW

   

  Label Comments:

SHAKE WELL

 

  Sodium polystyrene sulfonate 250 MG/ML Oral Suspension [SPS] (RxNorm): 9126852

 

KAYEXALATE (SODIUM POLYSTYRENE SULFONATE) NDC: 78634781565

 



 

 LASIX INJ(FUROSEMIDE) 100 MG/10 ML INJECTION

Dose: 8 ML

 

  INTRAVEN

  AS DIRECTED

   

  Label Comments:

MAY INCREASE FALL RISK

 

Special Dose Instructions:

Please give after 1st unit of blood is transfused

 

  10 ML Furosemide 10 MG/ML Injection (RxNorm): 6474785

 

LASIX INJ (FUROSEMIDE) NDC: 75194990477

 



 

 DEXTROSE 25 G/50 ML INJECTION

Dose: 100 ML

 

  INTRAVEN

  NOW

   

   

  50 ML Glucose 500 MG/ML Prefilled Syringe (RxNorm): 144255

 

 (DEXTROSE) NDC: 27002533028

 



 

 PROTONIX INJ(PANTOPRAZOLE) 40 MG INJECTION

Dose: 40 MG

 

  INTRAVEN

  BID@0600,1700

   

  Rx Order Comments:

Order filed UNV:

Dose Warnings differ from 

 

Label Comments:

DILUTE WITH 10 ML OF NORMAL SALINE.

GIVE OVER 2 MINUTES.

 

  pantoprazole 40 MG Injection [Protonix] (RxNorm): 597081

 

PROTONIX INJ (PANTOPRAZOLE) NDC: 41270029829

 



 

 REGLAN INJ(METOCLOPRAMIDE HCL) 5 MG/ML INJECTION

Dose: 2 ML

 

  INTRAVEN

  30 MIN BEFORE MEALS & BEDTIME

   

  Rx Order Comments:

Order filed UNV:

Allergies/Duplicates/Interactions differ from 

 

Label Comments:

MAY INCREASE FALL RISK

 

  2 ML Metoclopramide 5 MG/ML Injection (RxNorm): 435702

 

REGLAN INJ (METOCLOPRAMIDE HCL) NDC: 55209897899

 



 

 COREG(CARVEDILOL) 25 MG TAB

Dose: 25 MG

 

  ORAL

  WITH BREAKFAST & SUPPER

   

  Label Comments:

MAY INCREASE FALL RISK

TAKE WITH FOOD

 

  carvedilol 25 MG Oral Tablet [Coreg] (RxNorm): 409738

 

COREG (CARVEDILOL) NDC: 32737062667

 



 

 COREG(CARVEDILOL) 12.5 MG TAB

Dose: 12.5 MG

 

  ORAL

  ONE TIME ORDER

   

  Rx Order Comments:

Order filed UNV:

Allergies/Duplicates/Interactions differ from 

Dose Warnings differ from 

 

Label Comments:

MAY INCREASE FALL RISK

TAKE WITH FOOD

 

  carvedilol 12.5 MG Oral Tablet [Coreg] (RxNorm): 999068

 

COREG (CARVEDILOL) NDC: 39392995068

 



 

 KAYEXALATE(SODIUM POLYSTYRENE SULFONATE) 15 GM/60 ML SUSPENSION

Dose: 60 ML

 

  ORAL

  NOW

   

  Label Comments:

SHAKE WELL

 

  Sodium polystyrene sulfonate 250 MG/ML Oral Suspension [SPS] (RxNorm): 0818317

 

KAYEXALATE (SODIUM POLYSTYRENE SULFONATE) NDC: 61007095339

 



 

 CALCIUM GLUCONATE 1 GM/10 ML INJECTION

Dose: 10 ML

 

  INTRAVEN

  NOW

   

  Label Comments:

 4.65 MEQ=1 GM

 

Special Dose Instructions:

PUSH OVER 10 MINUTES

 

  10 ML Calcium Gluconate 100 MG/ML Injection (RxNorm): 4806111

 

 (CALCIUM GLUCONATE) NDC: 95457842261

 



 

 DEXTROSE 25 G/50 ML INJECTION

Dose: 100 ML

 

  INTRAVEN

  NOW

   

   

  50 ML Glucose 500 MG/ML Prefilled Syringe (RxNorm): 032035

 

 (DEXTROSE) NDC: 53295715531

 



 

 HumuLIN R(INSULIN HUM REG) 300 UNIT/3 ML INJECTION

Dose: 0.1 ML

 

  INTRAVEN

  NOW

   

  Label Comments:

GIVE 30 MINUTES BEFORE A MEAL

 

  Regular Insulin, Human 100 UNT/ML Injectable Solution [Humulin R] (RxNorm): 
015009

 

HumuLIN R (INSULIN HUM REG) NDC: 54566650085

 



 

 VENTOLIN 0.5% NEB(ALBUTEROL SULF) 2.5 MG/0.5 ML INHALER

Dose: 2 ML

 

  INHALED

  NOW

   

   

  Albuterol 1 MG/ML Inhalant Solution (RxNorm): 708486

 

VENTOLIN 0.5% NEB (ALBUTEROL SULF) NDC: 94122461648

 



 

 LASIX INJ(FUROSEMIDE) 100 MG/10 ML INJECTION

Dose: 8 ML

 

  INTRAVEN

  NOW

   

  Label Comments:

MAY INCREASE FALL RISK

 

  10 ML Furosemide 10 MG/ML Injection (RxNorm): 0416105

 

LASIX INJ (FUROSEMIDE) NDC: 39801680853

 



 

 APRESOLINE INJ(HydrALAZINE) 20 MG/ML INJECTION

Dose: 0.5 ML

 

  INTRAVEN

  Q4H

  PRN Reason: HYPERTENSION

  Label Comments:

MAY INCREASE FALL RISK

 

Special Dose Instructions:

For systolic blood pressure greater than 170

 

  Hydralazine Hydrochloride 20 MG/ML Injectable Solution (RxNorm): 133834

 

APRESOLINE INJ (HydrALAZINE) NDC: 56951184978

 



 

 NICODERM 21 MG PATCH(NICOTINE) 1 PATCH PATCH

Dose: 1 PATCH

 

  TOPICALLY

  DAILY

   

  Label Comments:

WEAR GLOVES FOR HANDLING OR WASH HANDS AFTER 

HANDLING.

 

  24 HR Nicotine 0.875 MG/HR Transdermal System (RxNorm): 237567

 

NICODERM 21 MG PATCH (NICOTINE) NDC: 27679195211

 



 

 APRESOLINE(HydrALAZINE) 10 MG TAB

Dose: 10 MG

 

  ORAL

  3 TIMES A DAY

   

  Rx Order Comments:

Order filed UNV:

Allergies/Duplicates/Interactions differ from 

 

Label Comments:

MAY INCREASE FALL RISK

 

  Hydralazine Hydrochloride 10 MG Oral Tablet (RxNorm): 330064

 

APRESOLINE (HydrALAZINE) NDC: 55250498390

 



 

 APRESOLINE(HydrALAZINE) 50 MG TAB

Dose: 50 MG

 

  ORAL

  3 TIMES A DAY

   

  Label Comments:

MAY INCREASE FALL RISK

 

  Hydralazine Hydrochloride 50 MG Oral Tablet (RxNorm): 326228

 

APRESOLINE (HydrALAZINE) NDC: 41116378553

 



 

 LEVEMIR(INSULIN DETEMIR) 1000 UNITS/10 ML INJECTION

Dose: 0.1 ML

 

  SUBCUTANEOUSLY

  AT BEDTIME

   

  Label Comments:

Do NOT refrigerate. * Syringe expires in 24 hours.

 

  insulin detemir 100 UNT/ML Injectable Solution [Levemir] (RxNorm): 874221

 

LEVEMIR (INSULIN DETEMIR) NDC: 20946351118

 



 

 APRESOLINE(HydrALAZINE) 50 MG TAB

Dose: 100 MG

 

  ORAL

  3 TIMES A DAY

   

  Rx Order Comments:

Order filed UNV:

Allergies/Duplicates/Interactions differ from 

 

Label Comments:

MAY INCREASE FALL RISK

 

  Hydralazine Hydrochloride 50 MG Oral Tablet (RxNorm): 020041

 

APRESOLINE (HydrALAZINE) NDC: 52464428074

 



 

 PROTONIX(PANTOPRAZOLE SOD) 40 MG TAB

Dose: 40 MG

 

  ORAL

  BID@0600,1700

   

   

  pantoprazole 40 MG Delayed Release Oral Tablet [Protonix] (RxNorm): 535546

 

PROTONIX (PANTOPRAZOLE SOD) NDC: 05589090552

 



 

 REGLAN(METOCLOPRAMIDE HCL) 10 MG TAB

Dose: 10 MG

 

  ORAL

  30 MIN BEFORE MEALS & BEDTIME

   

  Label Comments:

MAY INCREASE FALL RISK

 

  Metoclopramide 10 MG Oral Tablet (RxNorm): 410894

 

REGLAN (METOCLOPRAMIDE HCL) NDC: 18685002376

 













 Discharge Medications - Medications that patient should continue to take. 
Review with physician 

 

 Visit/Account #[id number] (2017 6:04pm - 2017 6:54pm) 

 

 Medication  Route  Sig/Schedule  Precondition/Indication  Comments/Instructions
  Codes

 

 LEVEMIR FLEXTOUCH(INSULIN DETEMIR) 100 UNIT/1 ML INSULN.PEN

Dose: 12 UNIT

 

  SUBCUTANEOUSLY

  AT BEDTIME

   

  Rx Note Text Comments:

*RX HAS NOT FILLED, PT STATES SHE TAKES*

 

  3 ML insulin detemir 100 UNT/ML Pen Injector [Levemir] (RxNorm): 081894

 

LEVEMIR FLEXTOUCH (INSULIN DETEMIR) NDC: 15138588720

 



 

 TYLENOL(ACETAMINOPHEN) 500 MG TABLET

Dose: 500 MG

 

  ORAL

  Q4H

  PAIN OR FEVER

   

  TYLENOL (ACETAMINOPHEN) NDC: 99973694598

 



 

 Aspirin Ec(ASPIRIN) 81 MG TAB

Dose: 81 MG

 

  ORAL

  DAILY

   

   

  Aspirin 81 MG Delayed Release Oral Tablet (RxNorm): 722677

 

Aspirin Ec (ASPIRIN) NDC: 98682109883

 



 

 Crestor(ROSUVASTATIN CALCIUM) 20 MG TAB

Dose: 20 MG

 

  ORAL

  DAILY

   

   

  Rosuvastatin calcium 20 MG Oral Tablet [Crestor] (RxNorm): 261540

 

Crestor (ROSUVASTATIN CALCIUM) NDC: 74886012525

 



 

 REGLAN(METOCLOPRAMIDE HCL) 10 MG TABLET

Dose: 10 MG

 

  ORAL

  3 TIMES DAILY WITH MEALS

   

   

  Metoclopramide 10 MG Oral Tablet [Reglan] (RxNorm): 121195

 

REGLAN (METOCLOPRAMIDE HCL) NDC: 37447170061

 



 

 PROTONIX(PANTOPRAZOLE) 40 MG TAB

Dose: 40 MG

 

  ORAL

  TWICE A DAY

   

   

  pantoprazole 40 MG Delayed Release Oral Tablet [Protonix] (RxNorm): 698820

 

PROTONIX (PANTOPRAZOLE) NDC: 43398673727

 



 

 Coreg(CARVEDILOL) 25 MG TAB

Dose: 25 MG

 

  ORAL

  WITH BREAKFAST & SUPPER

   

   

  carvedilol 25 MG Oral Tablet [Coreg] (RxNorm): 434202

 

Coreg (CARVEDILOL) NDC: 44379398176

 



 

 APRESOLINE(HydrALAZINE) 50 MG TAB

Dose: 100 MG

 

  ORAL

  3 TIMES A DAY

   

   

  Hydralazine Hydrochloride 50 MG Oral Tablet (RxNorm): 697314

 

APRESOLINE (HydrALAZINE) NDC: 54525017744

 









History Of Encounters







 Encounters 

 

 Visit/Account #[id number] (2017 6:04pm - 2017 6:54pm) 

 

 Account Status  Physican Of Record  Reason For Visit  Visit Diagnosis  Start 
Date/Time  Stop Date/Time

 

 SEGUNDO DE LA CRUZ MD

  ACUTE ON CHRONIC RENAL FAILURE, HYPERKALEMIA

  E87.5: HYPERKALEMIA ICD10

  2017 6:04pm

  2017 7:45pm



 

 Pricilla FITZPATRICK MD

  ACUTE ON CHRONIC RENAL FAILURE, HYPERKALEMIA

  E87.5: HYPERKALEMIA ICD10

  Apr 3, 2017 8:36am

  2017 6:54pm



 

 IN

  CORINNA NASSAR MD

  ACUTE ON CHRONIC RENAL FAILURE, HYPERKALEMIA

  E87.5: HYPERKALEMIA ICD10

  Apr 3, 2017 8:36am

  2017 6:54pm









History of Procedures







 Procedure List 

 

 Visit/Account #[id number] (2017 6:04pm - 2017 6:54pm) 

 

 Code/Procedure  Date

 

 [id number]: EXCISION OF RIGHT KIDNEY, PERCUTANEOUS APPROACH, DIAGNOSTIC

  2017









Discharge Instructions







 Discharge Instructions

 

 Visit/Account #[id number] (2017 6:04pm - 2017 6:54pm)

 

 DISCHARGE INSTRUCTIONS Physician Documentation

 

PROVIDER INSTRUCTIONS

 

Discharge Diet As Tolerated

 

Discharge Activity/Weight Bearing Status As tolerated

 

Other Discharge Instructions

Stop taking the Amlodipine, as this was likely making your swelling worse.

Stop taking the Spironolactone due to your elevated potassium.

Your dose of Coreg has been increased to 25 mg BID.

You have been started on Hydralazine 100 mg TID for your blood pressure.

Keep your follow up appointments with [facility] and Dr. Salamanca.

 

Discharge Diet As Tolerated

 

Discharge Activity/Weight Bearing Status As tolerated

 

Other Discharge Instructions

Stop taking the Amlodipine, as this was likely making your swelling worse.

Stop taking the Spironolactone due to your elevated potassium.

Your dose of Coreg has been increased to 25 mg BID.

You have been started on Hydralazine 100 mg TID for your blood pressure.

Keep your follow up appointments with [facility] and Dr. Salamanca.

 

CARE MANAGEMENT/HOME HEALTH

 

Care Management 2 weeks worth of Hydralazine, written rx provided

 

REASON TO CALL PROVIDER

 

 

Notify Physician if: New or worsening symptoms, increased shortness of breath

 

FOLLOW UP APPOINTMENTS

 

 

Follow Up Appointment Date/Time: F/u lab appointment at [facility] on 17 at 12:20 
pm.

Also needs f/u appt in 1 week at [facility].  AT 11:30AM

Needs f/u in 1 week at Dr. Salamanca's office.  AT 4:30AM WITH NON FASTING 
LAB

 

 

Follow-up tests/procedures/outpatient needs: F/u labs tomorrow at [facility]

 









Social History







 Social History 









 No Social History Data.









Immunizations







 Immunizations

 

 Patient Unit Number: [id number]

 

 Immunizations

 

 No immunizations recorded.

## 2019-03-03 NOTE — XMS REPORT
[facility], [facility].

 Created on: 2014



Ingrid Riojas

External Reference #: [id number]

: 1960

Sex: Female



Demographics







 Address  [address]  [address]

 

 Home Phone  [phone]

 

 Preferred Language  Unknown

 

 Marital Status  Unknown

 

 [employer] Affiliation  Unknown

 

 Race  White

 

 Ethnic Group  Not  or 





Author







 Author  Kimi Corrales

 

 [facility]  [employer]

 

 Address  Unknown

 

 Phone  Unavailable







Care Team Providers







 Care Team Member Name  Role  Phone

 

 Kimi Corrales    Unavailable



                                                                



Allergies

          No Known Allergies                                                   
                                     



Problems

          





 Problem Type  Condition  ICD-9 Code  Onset Dates  Condition Status

 

 Problem  Diabetes Mellitus Type 2, not stated as uncontrolled  250.00     
Active

 

 Problem  Hypertension, benign  401.1     Active

 

 Problem  Hypertensive retinopathy  362.11     Active



                                                                               
                             



Medications

          No Known Medications                                                 
                                                 



Vital Signs

          





 Date/Time:  Dec 17, 2014

 

 BMI  27.10 Index

 

 Height  66.5 inches

 

 Weight  170.5 lbs

 

 Respiratory Rate  16 per Minute

 

 Blood Pressure Diastolic  82 mm Hg

 

 Blood Pressure Systolic  166 mm Hg

 

 Cardiac Monitoring Heart Rate  96 Beats per Minute



                                                                              



Results

          No Known Results                                                     
               



Summary Purpose

          eClinicalWorks Submission

## 2019-03-03 NOTE — XMS REPORT
Continuity of Care Document

 Created on: 2019



Ingrid Riojas

External Reference #: [id number]

: 1960

Sex: Female



Demographics







 Address  [address]  [address]

 

 Home Phone  [phone] x

 

 Preferred Language  Unknown

 

 Marital Status  Unknown

 

 Scientologist Affiliation  Unknown

 

 Race  Unknown

 

 Ethnic Group  Unknown





Author







 Author  [facility]

 

 Organization  [facility]

 

 Address  Unknown

 

 Phone  Unavailable



              



Allergies

      





 Active            Description            Code            Type            
Severity            Reaction            Onset            Reported/Identified   
         Relationship to Patient            Clinical Status        

 

 Yes            PENICILLIN                         Drug Allergy            N/A 
           Skin Rash                                                            

 

 Yes            PENICLLIN                         Drug Allergy            N/A  
          N/A                                                            

 

 Yes            PENICILLINS            [id number]            Drug Allergy      
      Moderate            RASH                         10/11/2014              
                    

 

 Yes            CODEINE            [id number]            Drug Allergy          
  Mild            RASH                         2016                      
            

 

 Yes            PENICILLINS            [id number]            Drug Allergy      
      Moderate            RASH                         2017              
                    

 

 Yes            CODEINE            [id number]            Drug Allergy          
  Mild            RASH                         2017                      
            

 

 Yes            CODEINE            [id number]            Drug Allergy          
  Mild            RASH                         2017                      
            

 

 Yes            PENICILLINS            [id number]            Drug Allergy      
      Moderate            RASH                         2017              
                    

 

 Yes            PENICILLINS            [id number]            Drug Allergy      
      Moderate            RASH                         2017              
                    

 

 Yes            CODEINE            [id number]            Drug Allergy          
  Mild            RASH                         2017                      
            

 

 Yes            HYDROCODONE            [id number]            Drug Allergy      
      Moderate            VOMITING                         2017          
                        

 

 Yes            ACETAMINOPHEN            [id number]            Drug Allergy    
        Moderate            VOMITING                         2017        
                          

 

 Yes            HYDROCODONE            [id number]            Drug Allergy      
      Moderate            VOMITING                         2017          
                        

 

 Yes            hydrocodone            [id number]            Drug Allergy      
      Moderate            VOMITING                         2018          
                        

 

 Yes            codeine            [id number]            Drug Allergy          
  Mild            RASH                         2018                      
            

 

 Yes            Penicillins            [id number]            Drug Allergy      
      Moderate            RASH                         2018              
                    



                                                



Medications

      





 Medication            Packaging            Start Date            Stop Date    
        Route            Dosage            Sig        

 

             RANITIDINE HCL                      11            2015      
                   ORAL            [id number]                                  
        

 

             NAPROSYN                      11            2015            
             ORAL            [id number]                                        
  

 

             METFORMIN HCL                      11            2015       
                  ORAL            [id number]                                 
         

 

             CARVEDILOL                      11            2015          
               ORAL            6.25MG6.25MG                                    
      

 

             GABAPENTIN                      11            2015          
               ORAL            [id number]                                      
    

 

             CYCLOBENZAPRINE HCL                      0.50.5            2015            ORAL            [id number]                 
                         

 

             LISINOPRIL                                   2015           
              ORAL                                                       

 

             NEXIUM                      11            07/15/2015              
           ORAL            [id number]                                          

 

             REGLAN                      11            2015            ORAL            [id number]                                    
      

 

             TRAMADOL HCL                      11            2015            ORAL            [id number]                             
             

 

             LISINOPRIL-HYDROCHLOROTHIAZIDE                      22            
2015            ORAL            [id number]     
                                     

 

             CRESTOR                      11            2015             
            ORAL            5MG5MG                                          

 

             METOCLOPRAMIDE HCL                                   2015   
                      Oral            [id number]                                 
         

 

             LANTUS SOLOSTAR                                   2015      
                   Subcutaneous            100UNIT/XU011WULE/ML                
                          

 

             HUMALOG KWIKPEN                                   2015      
                   Subcutaneous            100UNIT/RY339KAKR/ML                
                          

 

             COREG                      11            2015               
          ORAL            12.5MG12.5MG                                          

 

             CRESTOR                      11            2015             
            ORAL            5MG5MG                                          

 

             RANITIDINE HCL                                   2016            Oral            [id number]                          
                

 

             LASIX                      11            05/10/2016            05/
            ORAL            [id number]                                    
      

 

             LASIX                      11            2016               
          ORAL            [id number]                                          

 

             FUROSEMIDE                      11            2016          
               Oral            [id number]                                        
  

 

             LISINOPRIL-HYDROCHLOROTHIAZIDE                      2016                         ORAL            [id number]              
                            

 

             RANITIDINE HCL                                   2016       
                  Oral            [id number]                                   
       

 

             TRAMADOL HCL                      11            2016            ORAL            [id number]                             
             

 

             PLAVIX                      11            2016              
           ORAL            [id number]                                          

 

             SPIRONOLACTONE                      22            2016      
                   ORAL            [id number]                                    
      

 

             METOCLOPRAMIDE HCL                      11            2016  
                       ORAL            5MG5MG                                  
        

 

             SPIRONOLACTONE                      44            2016      
                   ORAL            [id number]                                    
      

 

             methylPREDNISolone Sod Succ PF                                   2018            IM            125 MG                 
       ONE                  

 

             ACETAMINOPHEN                                   2018            PO            1000 MG                        ONE     
             

 

             IBUPROFEN                                   2018            PO            800 MG                        ONE          
        

 

             HUSH PROTOCOL NOTE                                   2018   
                      XX            1 EACH                        NOW          
        

 

             ACETAMINOPHEN                                   2018        
                 PO            500 MG                        Q4H               
   

 

             ONDansetron Inj                                   2018      
                   IV            4 MG                        Q6H               
   

 

             IBUPROFEN                                   2018            
             PO            800 MG                        TID&0900,1400,2100    
              

 

             SEVELAMER                                   2018            
             PO            800 MG                        PRN                  

 

             ACETAMINOPHEN                                   2018        
                 PO            1000 MG                        Q4H              
    

 

             CARVEDILOL ER                                   2018        
                 PO            40 MG                        MOWEFR             
     

 

             INSULIN LISPRO                                   2018       
                  SUBCUT            0 UNIT                        SLIDING      
            

 

             DEXTROSE CHEW                                   2018        
                 PO            16 GM                        PRN                
  

 

             GLUCAGON INJ                                   2018         
                IM            1 MG                        PRN                  

 

             DEXTROSE 40% GEL                                   2018     
                    PO            15 GM                        PRN             
     

 

             DEXTROSE 50 % SYR                                   2018    
                     IV            12.5 GM                        PRN          
        

 

             SIMVASTATIN                                   2018          
               PO            20 MG                        BEDTIME&2100         
         

 

             INSULIN GLARGINE                                   2018     
                    SUBCUT            12 UNIT                        BEDTIME&
2100                  

 

             HEPARIN INJ PF                                   2018       
                  SUBCUT            5000 UNIT                        Q8HR&0600,
1400,2200                  

 

             PANTOPRAZOLE DR                                   2018      
                   PO            40 MG                        DAILY@0600&0600  
                

 

             SEVELAMER                                   2018            
             PO            800 MG                        TIDWM&0800,1130,1730  
                

 

             CARVEDILOL ER                                   2018        
                 PO            40 MG                        SuTuThSa@0800&0800 
                 

 

             ASPIRIN EC                                   2018           
              PO            81 MG                        DAILY&0900            
      

 

             amLODIPine                                   2018           
              PO            10 MG                        DAILY&0900            
      

 

             CARVEDILOL ER                                   2018        
                 PO            40 MG                        SUTUTHSA           
       

 

             ALBUMIN HUMAN 25% INJ                                   2018
                         IV            100 ML                        MOWEFR@09&
0900                  

 

             TiZANidine                                   2018           
              PO            4 MG                        TID&0900,1400,2100     
             

 

             GABAPENTIN                                   2018           
              PO            300 MG                        BEDTIME&2100         
         

 

             INSULIN GLARGINE                                   2018            SUBCUT            10 UNIT                        
ONE                  

 

             fentaNYL INJ                                   2018            .ROUTE            100 MCG                        .STK-
MED                  

 

             MIDAZOLAM INJ                                   2018            .ROUTE            4 MG                        .STK-
MED                  

 

             traMADol                                   2018             
            PO            50 MG                        Q6HR&0000,0600,1200,1800
                  

 

             oxyCODONE/ ACETAMIN 5/325                                   2018                         PO            1 TAB                        Q6H    
              

 

             ONDansetron ODT                                   2018      
                   PO            4 MG                        Q6H               
   

 

             PANTOPRAZOLE DR                                   2018      
                   PO            40 MG                        BID@0600,1700&0600
,1700                  

 

             INSULIN GLARGINE                                   2018            SUBCUT            6 UNIT                        ONE
&2100                  

 

             KETOROLAC INJ                                   2018            IV            30 MG                        ONE       
           

 

             KETOROLAC INJ                                   2018            IM            30 MG                        ONE       
           

 

             NICOTINE 14 MG PATCH                                   2018 
                        TOP            1 PATCH                        DAILY&
0900                  

 

             INSULIN GLARGINE                                   2018     
                    SUBCUT            12 UNIT                        BEDTIME&
2100                  

 

             TiZANidine                                   2018           
              PO            4 MG                        TID&0900,1400,2100     
             

 

             NON-FORMULARY MEDICATION                                   2018                         PO            40 EACH                        
MOWEFR                  

 

             TiZANidine                                   2018           
              PO            4 MG                        TID&0900,1400,2100     
             

 

             ACETAMINOPHEN                                   2018        
                 PO            1000 MG                        Q4H              
    

 

             CARVEDILOL ER                                   2018        
                 PO            40 MG                        MoWeFr@0900&0900   
               

 

             SEVELAMER                                   2018            
             PO            800 MG                        PRN                  

 

             DEXTROSE CHEW                                   2018        
                 PO            16 GM                        PRN                
  

 

             GLUCAGON INJ                                   2018         
                IM            1 MG                        PRN                  

 

             DEXTROSE 40% GEL                                   2018     
                    PO            15 GM                        PRN             
     

 

             DEXTROSE 50 % SYR                                   2018    
                     IV            12.5 GM                        PRN          
        

 

             NON-FORMULARY MEDICATION                                   2018                         PO            800 EACH                        TIDWM
&0800,1130,1730                  

 

             SEVELAMER                                   2018            
             PO            800 MG                        TIDWM&0800,1130,1730  
                

 

             GABAPENTIN                                   2018           
              PO            300 MG                        BEDTIME&2100         
         

 

             SIMVASTATIN                                   2018          
               PO            20 MG                        BEDTIME&2100         
         

 

             HEPARIN INJ PF                                   2018       
                  SUBCUT            5000 UNIT                        Q12HR&0900,
2100                  

 

             INSULIN GLARGINE                                   2018     
                    SUBCUT            12 UNIT                        BEDTIME&
2100                  

 

             IBUPROFEN                                   2018            
             PO            800 MG                        TID&0900,1400,2100    
              

 

             PANTOPRAZOLE DR                                   2018      
                   PO            80 MG                        DAILY@0600&0600  
                

 

             ONDansetron ODT                                   2018      
                   PO            4 MG                        Q4H               
   

 

             PITAVASTATIN                                   2018         
                PO            2 MG                        DAILY&0900           
       

 

             ASPIRIN EC                                   2018           
              PO            81 MG                        DAILY&0900            
      

 

             amLODIPine                                   2018           
              PO            10 MG                        DAILY&0900            
      

 

             NICOTINE 14 MG PATCH                                   2018 
                        TOP            1 PATCH                        DAILY&
0900                  

 

             CARVEDILOL ER                                   2018        
                 PO            40 MG                        SuTuThSa@0900&0900 
                 

 

             traMADol                                   2018             
            PO            50 MG                        Q4H                  

 

             NON-FORMULARY MEDICATION                                   2018                         PO            40 EACH                        
SUTUTHSA                  

 

             POLYETHYLENE GLYCOL (3350)                                   2018                         PO            17 GM                        DAILY&
0900                  

 

             POLYETHYLENE GLYCOL (3350)                                   2018                         PO            17 GM                        BID&0900
,2100                  

 

             DICLOFENAC SOD 1% GEL                                   2018
                         TOP            1 APPL                        4XD&0900,
1300,1700,2100                  

 

             EPOETIN BETTYE INJ                                   2018     
                    SUBCUT            5000 UNIT                        MoWeFr@
0900&0900                  

 

             ACETAMINOPHEN                                   2018        
                 PO            650 MG                        Q4H               
   

 

             BISACODYL SUPP                                   2018            VA            10 MG                        ONE      
            

 

             INSULIN GLARGINE                                   2018     
                    SUBCUT            9 UNIT                        BEDTIME&
2100                  

 

             GABAPENTIN                                   2018           
              PO            100 MG                        BEDTIME&2100         
         

 

             INSULIN GLARGINE                                   2018     
                    SUBCUT            7 UNIT                        BEDTIME&
2100                  

 

             POLYETHYLENE GLYCOL (3350)                                   2018                         PO            17 GM                        Q48HR&
0900                  



                                                                               
                                                                               
                                                                



Problems

      





 Date Dx Coded            Attending            Type            Code            
Diagnosis            Diagnosed By        

 

 10/13/2014                         Other            250.02            DIAB 
SULEIMAN WO COMPL, TYPE II OR UNSPEC TYPE, UNCONTROLLED                     

 

 10/13/2014                         Other            276.1            
HYPOSMOLALITY                     

 

 10/13/2014                         Other            305.1            TOBACCO 
USE DISORDER                     

 

 10/13/2014                         Other            401.9            
HYPERTENSION NOS                     

 

 10/13/2014                         Other            786.59            CHEST 
PAIN NEC                     

 

 01/15/2015                         Other            041.49            OTHER 
AND UNSPECIFIED ESCHERICHIA COLI [E. COLI]                     

 

 01/15/2015                         Other            599.0            URIN 
TRACT INFECTION NOS                     

 

 01/15/2015                         Other            780.4            DIZZINESS 
AND GIDDINESS                     

 

 01/15/2015                         Other            783.21            LOSS OF 
WEIGHT                     

 

 01/15/2015                         Other            787.01            NAUSEA 
WITH VOMITING                     

 

 01/15/2015                         Other            787.02            NAUSEA 
ALONE                     

 

 2016            ALEXANDRIA CARMONA MD            Other            D64.89 
           OTHER SPECIFIED ANEMIAS                     

 

 2016            ALEXANDRIA CARMONA MD            Other            E11.9  
          TYPE 2 DIABETES MELLITUS WITHOUT COMPLICATIONS                     

 

 2016            ALEXANDRIA CARMONA MD            Other            F17.210
            NICOTINE DEPENDENCE, CIGARETTES, UNCOMPLICATED                     

 

 2016            ALEXANDRIA CARMONA MD            Other            I10    
        ESSENTIAL (PRIMARY) HYPERTENSION                     

 

 2016            ALEXANDRIA CARMONA MD            Other            K57.30 
           DVRTCLOS OF LG INT W/O PERFORATION OR ABSCESS W/O BLEEDING          
           

 

 2016            ALEXANDRIA CARMONA MD            Other            Z79.4  
          LONG TERM (CURRENT) USE OF INSULIN                     

 

 2016            ALEXANDRIA CARMONA MD            Other            Z79.899
            OTHER LONG TERM (CURRENT) DRUG THERAPY                     

 

 2016            SANDEEP ANDERSON            Other            
D64.9            ANEMIA, UNSPECIFIED                     

 

 2016            SANDEEP ANDERSON            Other            
I50.9            HEART FAILURE, UNSPECIFIED                     

 

 2016            SANDEEP ANDERSON            Other            J90 
           PLEURAL EFFUSION, NOT ELSEWHERE CLASSIFIED                     

 

 2016            SANDEEP ANDERSON            Other            
R06.00            DYSPNEA, UNSPECIFIED                     

 

 2016                         Other            D63.8            ANEMIA IN 
OTHER CHRONIC DISEASES CLASSIFIED ELSEWHERE                     

 

 2016                         Other            E11.8            TYPE 2 
DIABETES MELLITUS WITH UNSPECIFIED COMPLICATIONS                     

 

 2016                         Other            E66.9            OBESITY, 
UNSPECIFIED                     

 

 2016                         Other            E78.0            PURE 
HYPERCHOLESTEROLEMIA                     

 

 2016                         Other            E78.5            
HYPERLIPIDEMIA, UNSPECIFIED                     

 

 2016                         Other            E87.6            
HYPOKALEMIA                     

 

 2016                         Other            F17.210            
NICOTINE DEPENDENCE, CIGARETTES, UNCOMPLICATED                     

 

 2016                         Other            I10            ESSENTIAL (
PRIMARY) HYPERTENSION                     

 

 2016                         Other            I21.4            NON-ST 
ELEVATION (NSTEMI) MYOCARDIAL INFARCTION                     

 

 2016                         Other            I25.10            ATHSCL 
HEART DISEASE OF NATIVE CORONARY ARTERY W/O ANG PCTRS                     

 

 2016                         Other            I50.9            HEART 
FAILURE, UNSPECIFIED                     

 

 2016                         Other            J45.909            
UNSPECIFIED ASTHMA, UNCOMPLICATED                     

 

 2016                         Other            J90            PLEURAL 
EFFUSION, NOT ELSEWHERE CLASSIFIED                     

 

 2016                         Other            K29.70            GASTRITIS
, UNSPECIFIED, WITHOUT BLEEDING                     

 

 2016                         Other            K57.30            DVRTCLOS 
OF LG INT W/O PERFORATION OR ABSCESS W/O BLEEDING                     

 

 2016                         Other            M54.5            LOW BACK 
PAIN                     

 

 2016                         Other            N17.9            ACUTE 
KIDNEY FAILURE, UNSPECIFIED                     

 

 2016                         Other            Z79.4            LONG TERM 
(CURRENT) USE OF INSULIN                     

 

 2016                         Other            Z88.0            ALLERGY 
STATUS TO PENICILLIN                     

 

 2016            CORINNA NASSAR MD            Other            
D63.8            ANEMIA IN OTHER CHRONIC DISEASES CLASSIFIED ELSEWHERE         
            

 

 2016            CORINNA NASSAR MD            Other            
E11.8            TYPE 2 DIABETES MELLITUS WITH UNSPECIFIED COMPLICATIONS       
              

 

 2016            CORINNA NASSAR MD            Other            
E66.9            OBESITY, UNSPECIFIED                     

 

 2016            CORINNA NASSAR MD            Other            
E78.0            PURE HYPERCHOLESTEROLEMIA                     

 

 2016            CORINNA NASSAR MD            Other            
E78.5            HYPERLIPIDEMIA, UNSPECIFIED                     

 

 2016            CORINNA NASSAR MD            Other            
E87.6            HYPOKALEMIA                     

 

 2016            CORINNA NASSAR MD            Other            
F17.210            NICOTINE DEPENDENCE, CIGARETTES, UNCOMPLICATED              
       

 

 2016            CORINNA NASSAR MD            Other            I10 
           ESSENTIAL (PRIMARY) HYPERTENSION                     

 

 2016            CORINNA NASSAR MD            Other            
I21.4            NON-ST ELEVATION (NSTEMI) MYOCARDIAL INFARCTION               
      

 

 2016            CORINNA NASSAR MD            Other            
I25.10            ATHSCL HEART DISEASE OF NATIVE CORONARY ARTERY W/O ANG PCTRS 
                    

 

 2016            CORINNA NASSAR MD            Other            
I50.9            HEART FAILURE, UNSPECIFIED                     

 

 2016            CORINNA NASSAR MD            Other            
J45.909            UNSPECIFIED ASTHMA, UNCOMPLICATED                     

 

 2016            CORINNA NASSAR MD            J90 
           PLEURAL EFFUSION, NOT ELSEWHERE CLASSIFIED                     

 

 2016            CORINNA NASSAR MD            Other            
K29.70            GASTRITIS, UNSPECIFIED, WITHOUT BLEEDING                     

 

 2016            CORINNA NASSAR MD            Other            
K57.30            DVRTCLOS OF LG INT W/O PERFORATION OR ABSCESS W/O BLEEDING   
                  

 

 2016            CORINNA NASSAR MD            Other            
M54.5            LOW BACK PAIN                     

 

 2016            CORINNA NASSAR MD            Other            
N17.9            ACUTE KIDNEY FAILURE, UNSPECIFIED                     

 

 2016            CORINNA NASSAR MD            Other            
Z79.4            LONG TERM (CURRENT) USE OF INSULIN                     

 

 2016            CORINNA NASSAR MD            Other            
Z88.0            ALLERGY STATUS TO PENICILLIN                     

 

 2016            JANNETH BRADY MD            Other            D64.9   
         ANEMIA, UNSPECIFIED                     

 

 2016            JANNETH BRADY MD            Other            E11.9   
         TYPE 2 DIABETES MELLITUS WITHOUT COMPLICATIONS                     

 

 2016            JANNETH BRADY MD            Other            E78.5   
         HYPERLIPIDEMIA, UNSPECIFIED                     

 

 2016            JANNETH BRADY MD            Other            E87.6   
         HYPOKALEMIA                     

 

 2016            JANNETH BRADY MD            Other            F17.210 
           NICOTINE DEPENDENCE, CIGARETTES, UNCOMPLICATED                     

 

 2016            JANNETH BRADY MD            Other            G62.9   
         POLYNEUROPATHY, UNSPECIFIED                     

 

 2016            JANNETH BRADY MD            Other            I21.4   
         NON-ST ELEVATION (NSTEMI) MYOCARDIAL INFARCTION                     

 

 2016            JANNETH BRADY MD            Other            I25.2   
         OLD MYOCARDIAL INFARCTION                     

 

 2016            JANNETH BRADY MD            I34.0   
         NONRHEUMATIC MITRAL (VALVE) INSUFFICIENCY                     

 

 2016            JANNETH BRADY MD            I50.33  
          ACUTE ON CHRONIC DIASTOLIC (CONGESTIVE) HEART FAILURE                
     

 

 2016            JANNETH BRADY MD            N17.9   
         ACUTE KIDNEY FAILURE, UNSPECIFIED                     

 

 2016            JANNETH BRADY MD            Z79.4   
         LONG TERM (CURRENT) USE OF INSULIN                     

 

 2016            JANNETH BRADY MD            Z88.0   
         ALLERGY STATUS TO PENICILLIN                     

 

 2016            JANNETH BRADY MD            Z91.14  
          PATIENT'S OTHER NONCOMPLIANCE WITH MEDICATION REGIMEN                
     

 

 03/15/2017            HERMINIA GARCIA MD            D63.1    
        ANEMIA IN CHRONIC KIDNEY DISEASE                     

 

 03/15/2017            HERMINIA GARCIA MD            E11.22   
         TYPE 2 DIABETES MELLITUS W DIABETIC CHRONIC KIDNEY DISEASE            
         

 

 03/15/2017            HERMINIA GARCIA MD            E11.319  
          [facility] EDEMA              
       

 

 03/15/2017            HERMINIA GARCIA MD            E11.40   
         TYPE 2 DIABETES MELLITUS WITH DIABETIC NEUROPATHY, UNSP               
      

 

 03/15/2017            HERMINIA GARCIA MD            E78.5    
        HYPERLIPIDEMIA, UNSPECIFIED                     

 

 03/15/2017            HERMINIA GARCIA MD            E86.0    
        DEHYDRATION                     

 

 03/15/2017            HERMINIA GARCIA MD            F17.210  
          NICOTINE DEPENDENCE, CIGARETTES, UNCOMPLICATED                     

 

 03/15/2017            HERMINIA GARCIA MD            I11.0    
        HYPERTENSIVE HEART DISEASE WITH HEART FAILURE                     

 

 03/15/2017            HERMINIA GARCIA MD            I25.10   
         ATHSCL HEART DISEASE OF NATIVE CORONARY ARTERY W/O ANG PCTRS          
           

 

 03/15/2017            HERMINIA GARCIA MD            I25.2    
        OLD MYOCARDIAL INFARCTION                     

 

 03/15/2017            HERMINIA GARCIA MD            I50.42   
         CHRONIC COMBINED SYSTOLIC AND DIASTOLIC HRT FAIL                     

 

 03/15/2017            HERMINIA GARCIA MD            K21.9    
        GASTRO-ESOPHAGEAL REFLUX DISEASE WITHOUT ESOPHAGITIS                   
  

 

 03/15/2017            HERMINIA GARCIA MD            K22.10   
         ULCER OF ESOPHAGUS WITHOUT BLEEDING                     

 

 03/15/2017            HERMINIA GARCIA MD            K22.11   
         ULCER OF ESOPHAGUS WITH BLEEDING                     

 

 03/15/2017            HERMINIA GARCIA MD            K30      
      FUNCTIONAL DYSPEPSIA                     

 

 03/15/2017            HERMINIA GARCIA MD            K92.0    
        HEMATEMESIS                     

 

 03/15/2017            HERMINIA GARCIA MD            N17.9    
        ACUTE KIDNEY FAILURE, UNSPECIFIED                     

 

 03/15/2017            HERMINIA GARCIA MD            N18.4    
        CHRONIC KIDNEY DISEASE, STAGE 4 (SEVERE)                     

 

 03/15/2017            HERMINIA GARCIA MD            Z28.21   
         IMMUNIZATION NOT CARRIED OUT BECAUSE OF PATIENT REFUSAL               
      

 

 03/15/2017            HERMINIA GARCIA MD            Z79.4    
        LONG TERM (CURRENT) USE OF INSULIN                     

 

 03/15/2017            HERMINIA GARCIA MD            Z79.82   
         LONG TERM (CURRENT) USE OF ASPIRIN                     

 

 03/15/2017            HERMINIA GARCIA MD            Z79.899  
          OTHER LONG TERM (CURRENT) DRUG THERAPY                     

 

 03/15/2017            HERMINIA GARCIA MD            Z83.3    
        FAMILY HISTORY OF DIABETES MELLITUS                     

 

 03/15/2017            HERMINIA GARCIA MD            Z91.14   
         PATIENT'S OTHER NONCOMPLIANCE WITH MEDICATION REGIMEN                 
    

 

 2017            CORINNA NASSAR MD            
D63.8            ANEMIA IN OTHER CHRONIC DISEASES CLASSIFIED ELSEWHERE         
            

 

 2017            CORINNA NASSAR MD            
E11.21            TYPE 2 DIABETES MELLITUS WITH DIABETIC NEPHROPATHY           
          

 

 2017            CORINNA NASSAR MD            
E11.22            TYPE 2 DIABETES MELLITUS W DIABETIC CHRONIC KIDNEY DISEASE   
                  

 

 2017            CORINNA NASSAR MD            
E11.319            TYPE 2 DIABETES W UNSP DIABETIC RTNOP W/O MACULAR EDEMA     
                

 

 2017            CORINNA NASSAR MD            
E11.42            TYPE 2 DIABETES MELLITUS WITH DIABETIC POLYNEUROPATHY        
             

 

 2017            CORINNA NASSAR MD            
E11.43            TYPE 2 DIABETES W DIABETIC AUTONOMIC (POLY)NEUROPATHY        
             

 

 2017            CORINNA NASSAR MD            
E78.5            HYPERLIPIDEMIA, UNSPECIFIED                     

 

 2017            CORINNA NASSAR MD            
E87.2            ACIDOSIS                     

 

 2017            CORINNA NASSAR MD            
E87.5            HYPERKALEMIA                     

 

 2017            CORINNA NASSAR MD            
F17.210            NICOTINE DEPENDENCE, CIGARETTES, UNCOMPLICATED              
       

 

 2017            CORINNA NASSAR MD            
F32.9            MAJOR DEPRESSIVE DISORDER, SINGLE EPISODE, UNSPECIFIED        
             

 

 2017            CORINNA NASSAR MD            
I13.0            HYP HRT   CHR KDNY DIS W HRT FAIL AND STG 1-4/UNSP CHR KDNY   
                  

 

 2017            CESARIO MD, CORINNA K            Other            
I25.10            ATHSCL HEART DISEASE OF NATIVE CORONARY ARTERY W/O ANG PCTRS 
                    

 

 2017            CORINNA NASSAR MD            
I25.2            OLD MYOCARDIAL INFARCTION                     

 

 2017            CORINNA NASSAR MD            Other            
I50.23            ACUTE ON CHRONIC SYSTOLIC (CONGESTIVE) HEART FAILURE         
            

 

 2017            CORINNA NASSAR MD            Other            
J98.11            ATELECTASIS                     

 

 2017            CORINNA NASSAR MD            
K21.0            GASTRO-ESOPHAGEAL REFLUX DISEASE WITH ESOPHAGITIS             
        

 

 2017            CORINNA NASSAR MD            Other            
K31.84            GASTROPARESIS                     

 

 2017            CORINNA NASSAR MD            Other            
K92.2            GASTROINTESTINAL HEMORRHAGE, UNSPECIFIED                     

 

 2017            CORINNA NASSAR MD            
N17.9            ACUTE KIDNEY FAILURE, UNSPECIFIED                     

 

 2017            CORINNA NASSAR MD            Other            
N18.3            CHRONIC KIDNEY DISEASE, STAGE 3 (MODERATE)                     

 

 2017            CORINNA NASSAR MD            
N26.9            RENAL SCLEROSIS, UNSPECIFIED                     

 

 2017            CORINNA NASSAR MD            
Z79.4            LONG TERM (CURRENT) USE OF INSULIN                     

 

 2017            CORINNA NASSAR MD            Other            
Z88.0            ALLERGY STATUS TO PENICILLIN                     

 

 2017            DEANNA BROOKS MD            
E11.22            TYPE 2 DIABETES MELLITUS W DIABETIC CHRONIC KIDNEY DISEASE   
                  

 

 2017            DEANNA BROOKS MD            
E11.319            TYPE 2 DIABETES W [facility] DIABETIC RTNOP W/O MACULAR EDEMA     
                

 

 2017            DEANNA BROOKS MD            
E11.40            TYPE 2 DIABETES MELLITUS WITH DIABETIC NEUROPATHY, UNSP      
               

 

 2017            DEANNA BROOKS MD            
F17.210            NICOTINE DEPENDENCE, CIGARETTES, UNCOMPLICATED              
       

 

 2017            DEANNA BROOKS MD            I12.0
            HYP CHR KIDNEY DISEASE W STAGE 5 CHR KIDNEY DISEASE OR ESRD        
             

 

 2017            DEANNA BROOKS MD            I25.2
            OLD MYOCARDIAL INFARCTION                     

 

 2017            DEANNA BROOKS MD            K21.9
            GASTRO-ESOPHAGEAL REFLUX DISEASE WITHOUT ESOPHAGITIS               
      

 

 2017            DEANNA BROOKS MD            N17.9
            ACUTE KIDNEY FAILURE, UNSPECIFIED                     

 

 2017            DEANNA BROOKS MD            N18.4
            CHRONIC KIDNEY DISEASE, STAGE 4 (SEVERE)                     

 

 2017            DEANNA BROOKS MD            Z79.4
            LONG TERM (CURRENT) USE OF INSULIN                     

 

 2017            DEANNA BROOKS MD            
Z79.82            LONG TERM (CURRENT) USE OF ASPIRIN                     

 

 2017            DEANNA BROOKS MD            
Z79.899            OTHER LONG TERM (CURRENT) DRUG THERAPY                     

 

 2017            CORINNA NASSAR MD            Other            
D63.1            ANEMIA IN CHRONIC KIDNEY DISEASE                     

 

 2017            CORINNA NASSAR MD            
E11.21            TYPE 2 DIABETES MELLITUS WITH DIABETIC NEPHROPATHY           
          

 

 2017            CORINNA NASSAR MD            
E11.22            TYPE 2 DIABETES MELLITUS W DIABETIC CHRONIC KIDNEY DISEASE   
                  

 

 2017            CORINNA NASSAR MD            
E11.319            TYPE 2 DIABETES W UNSP DIABETIC RTNOP W/O MACULAR EDEMA     
                

 

 2017            CORINNA NASSAR MD            
E11.42            TYPE 2 DIABETES MELLITUS WITH DIABETIC POLYNEUROPATHY        
             

 

 2017            CORINNA NASSAR MD            
E78.5            HYPERLIPIDEMIA, UNSPECIFIED                     

 

 2017            CORINNA NASSAR MD            
E87.5            HYPERKALEMIA                     

 

 2017            CORINNA NASSAR MD            
F17.210            NICOTINE DEPENDENCE, CIGARETTES, UNCOMPLICATED              
       

 

 2017            CORINNA NASSAR MD            
F32.9            MAJOR DEPRESSIVE DISORDER, SINGLE EPISODE, UNSPECIFIED        
             

 

 2017            CORINNA NASSAR MD            
H54.0            BLINDNESS, BOTH EYES                     

 

 2017            CORINNA NASSAR MD            
I13.2            HYP HRT   CHR KDNY DIS W HRT FAIL AND W STG 5 CHR KDNY/ESRD   
                  

 

 2017            CORINNA NASSAR MD            Other            
I25.10            ATHSCL HEART DISEASE OF NATIVE CORONARY ARTERY W/O ANG PCTRS 
                    

 

 2017            CORINNA NASSAR MD            
I25.2            OLD MYOCARDIAL INFARCTION                     

 

 2017            CORINNA NASSAR MD            
I50.42            CHRONIC COMBINED SYSTOLIC AND DIASTOLIC HRT FAIL             
        

 

 2017            CORINNA NASSAR MD            
K21.9            GASTRO-ESOPHAGEAL REFLUX DISEASE WITHOUT ESOPHAGITIS          
           

 

 2017            CORINNA NASSAR MD            
M54.2            CERVICALGIA                     

 

 2017            CORINNA NASSAR MD            
N17.9            ACUTE KIDNEY FAILURE, UNSPECIFIED                     

 

 2017            CORINNA NASSAR MD            Other            
N18.6            END STAGE RENAL DISEASE                     

 

 2017            CORINNA NASSAR MD            
N26.9            RENAL SCLEROSIS, UNSPECIFIED                     

 

 2017            CORINNA NASSAR MD            
Z79.4            LONG TERM (CURRENT) USE OF INSULIN                     

 

 2017            CORINNA NASSAR MD            
Z79.82            LONG TERM (CURRENT) USE OF ASPIRIN                     

 

 2017            CORINNA NASSAR MD            
Z88.0            ALLERGY STATUS TO PENICILLIN                     

 

 2017            DEANNA BROOKS MD            
E11.22            TYPE 2 DIABETES MELLITUS W DIABETIC CHRONIC KIDNEY DISEASE   
                  

 

 2017            DEANNA BROOKS MD            
E11.40            TYPE 2 DIABETES MELLITUS WITH DIABETIC NEUROPATHY, UNSP      
               

 

 2017            DEANNA BROOKS MD            
E78.00            PURE HYPERCHOLESTEROLEMIA, UNSPECIFIED                     

 

 2017            DEANNA BROOKS MD            I13.2
            HYP HRT   CHR KDNY DIS W HRT FAIL AND W STG 5 CHR KDNY/ESRD        
             

 

 2017            DEANNA BROOKS MD            I25.2
            OLD MYOCARDIAL INFARCTION                     

 

 2017            DEANNA BROOKS MD            I50.9
            HEART FAILURE, UNSPECIFIED                     

 

 2017            DEANNA BROOKS MD            I87.1
            COMPRESSION OF VEIN                     

 

 2017            DEANNA BROOKS MD            K21.9
            GASTRO-ESOPHAGEAL REFLUX DISEASE WITHOUT ESOPHAGITIS               
      

 

 2017            DEANNA BROOKS MD            
M79.89            OTHER SPECIFIED SOFT TISSUE DISORDERS                     

 

 2017            DEANNA BROOKS MD            N18.6
            END STAGE RENAL DISEASE                     

 

 2017            DEANNA BROOKS MD            Z79.4
            LONG TERM (CURRENT) USE OF INSULIN                     

 

 2017            DEANNA BROOKS MD            
Z79.82            LONG TERM (CURRENT) USE OF ASPIRIN                     

 

 2017            DEANNA BROOKS MD            
Z79.899            OTHER LONG TERM (CURRENT) DRUG THERAPY                     

 

 2017            DEANNA BROOKS MD            Z99.2
            DEPENDENCE ON RENAL DIALYSIS                     

 

 2018            ANGELO OBRIEN, NAPOLEON WAITE            G57.01        
                          

 

 2018            ANGELO OBRIEN, NAPOLEON WAITE            M25.551       
                           

 

 2018            ANGELO OBRIEN, NAPOLEON C            A            M54.5         
                         

 

 2018            ANGELO OBRIEN, NAPOLEON C            F            Z79.4         
                         

 

 2018            ANGELO OBRIEN, NAPOLEON C            F            Z79.82        
                          

 

 2018            ANGELO OBRIEN, NAPOLEON CONNER            Other            M54.5     
       M54.5 - Low back pain                     

 

 2018            Sarah OBRIEN, Rohith            F            D63.1         
                         

 

 2018            Sarah OBRIEN, Rohith            F            E11.22        
                          

 

 2018            Sarah OBRIEN, Rohith            F            E83.39        
                          

 

 2018            Sarah OBRIEN, Rohith            F            F17.200       
                           

 

 2018            Sarah OBRIEN, Rohith            F            F32.9         
                         

 

 2018            Sarah OBRIEN, Rohith            F            G47.34        
                          

 

 2018            Sarah OBRIEN, Rohith            F            G89.29        
                          

 

 2018            Sarah OBRIEN, Rohith            F            I13.2         
                         

 

 2018            Sarah OBRIEN, Rohith            F            I25.10        
                          

 

 2018            Sarah OBRIEN, Rohith            F            I50.20        
                          

 

 2018            Sarah OBRIEN, Rohith            F            K21.9         
                         

 

 2018            Sarah OBRIEN, Rohith            F            M47.812       
                           

 

 2018            Sarah OBRIEN, Rohith            F            M54.40        
                          

 

 2018            Sarah OBRIEN, Rohith            F            M62.838       
                           

 

 2018            Sarah OBRIEN, Rohith            F            N18.6         
                         

 

 2018            Sarah OBRIEN, Rohith            F            R51           
                       

 

 2018            Sarah OBRIEN, Rohith            A            R53.1         
                         

 

 2018            Sarah OBRIEN, Rohith            F            T82.858A      
                            

 

 2018            Sarah OBRIEN, Rohith            F            Z79.4         
                         

 

 2018            Sarah OBRIEN, Rohith            F            Z79.82        
                          

 

 2018            Sarah OBRIEN, Rohith            F            Z83.3         
                         

 

 2018            Sarah OBRIEN, Rohith            F            Z88.0         
                         

 

 2018            Sarah OBRIEN, Rohith            F            Z88.5         
                         

 

 2018            Sarah OBRIEN, Rohith            F            Z99.2         
                         

 

 2018            Sarah OBRIEN, Rohith            Other            N18.6     
       N18.6 - End stage renal disease                     

 

 2018            Sarah OBRIEN, Rohith            Other            R26.2     
       R26.2 - Difficulty in walking, not elsewhere classified                 
    

 

 2018            Rohith Smith MD            Other            R53.1     
       R53.1 - Weakness                     

 

 2018            Rohith Smith MD            Other            Z99.2     
       Z99.2 - Dependence on renal dialysis                     

 

 2018            Rohith Smith MD            Other            N18.6     
       N18.6 - End stage renal disease                     

 

 2018            Rohith Smith MD            Other            R26.2     
       R26.2 - Difficulty in walking, not elsewhere classified                 
    

 

 2018            Rohith Smith MD            Other            R53.1     
       R53.1 - Weakness                     

 

 2018            Rohith Smith MD            Other            Z99.2     
       Z99.2 - Dependence on renal dialysis                     

 

 2018            Rohith Smith MD            Other            N18.6     
       N18.6 - End stage renal disease                     

 

 2018            Rohith Smith MD            Other            R26.2     
       R26.2 - Difficulty in walking, not elsewhere classified                 
    

 

 2018            Rohith Smith MD            Other            R53.1     
       R53.1 - Weakness                     

 

 2018            Rohith Smith MD            Other            Z99.2     
       Z99.2 - Dependence on renal dialysis                     

 

 2018            Rohith Smith MD            Other            N18.6     
       N18.6 - End stage renal disease                     

 

 2018            Rohith Smith MD            Other            R26.2     
       R26.2 - Difficulty in walking, not elsewhere classified                 
    

 

 2018            Rohith Smith MD            Other            R53.1     
       R53.1 - Weakness                     

 

 2018            Rohith Smith MD            Other            Z99.2     
       Z99.2 - Dependence on renal dialysis                     

 

 2018                         Other            N18.6            N18.6 - 
End stage renal disease                     

 

 2018                         Other            R26.2            R26.2 - 
Difficulty in walking, not elsewhere classified                     

 

 2018                         Other            R53.1            R53.1 - 
Weakness                     

 

 2018                         Other            Z99.2            Z99.2 - 
Dependence on renal dialysis                     

 

 2018                         Other            M48.00            M48.00 - 
Spinal stenosis, site unspecified                     

 

 2018                         Other            N18.6            N18.6 - 
End stage renal disease                     

 

 2018                         Other            R26.2            R26.2 - 
Difficulty in walking, not elsewhere classified                     

 

 2018                         Other            R53.1            R53.1 - 
Weakness                     

 

 2018                         Other            Z99.2            Z99.2 - 
Dependence on renal dialysis                     

 

 2018                         Other            M48.00            M48.00 - 
Spinal stenosis, site unspecified                     

 

 2018                         Other            N18.6            N18.6 - 
End stage renal disease                     

 

 2018                         Other            R26.2            R26.2 - 
Difficulty in walking, not elsewhere classified                     

 

 2018                         Other            R53.1            R53.1 - 
Weakness                     

 

 2018                         Other            Z99.2            Z99.2 - 
Dependence on renal dialysis                     

 

 2018                         Other            M48.00            M48.00 - 
Spinal stenosis, site unspecified                     

 

 2018                         Other            N18.6            N18.6 - 
End stage renal disease                     

 

 2018                         Other            R26.2            R26.2 - 
Difficulty in walking, not elsewhere classified                     

 

 2018                         Other            R53.1            R53.1 - 
Weakness                     

 

 2018                         Other            Z99.2            Z99.2 - 
Dependence on renal dialysis                     

 

 2018                         Other            M48.00            M48.00 - 
Spinal stenosis, site unspecified                     

 

 2018                         Other            N18.6            N18.6 - 
End stage renal disease                     

 

 2018                         Other            R26.2            R26.2 - 
Difficulty in walking, not elsewhere classified                     

 

 2018                         Other            R53.1            R53.1 - 
Weakness                     

 

 2018                         Other            Z99.2            Z99.2 - 
Dependence on renal dialysis                     

 

 2018                         Other            M48.00            M48.00 - 
Spinal stenosis, site unspecified                     

 

 2018                         Other            N18.6            N18.6 - 
End stage renal disease                     

 

 2018                         Other            R26.2            R26.2 - 
Difficulty in walking, not elsewhere classified                     

 

 2018                         Other            R53.1            R53.1 - 
Weakness                     

 

 2018                         Other            Z99.2            Z99.2 - 
Dependence on renal dialysis                     

 

 2018            Ronni OBRIEN, C. S.            F            D63.1          
                        

 

 2018            Ronni OBRIEN, C. S.            F            E11.22         
                         

 

 2018            Ronni OBRIEN, C. S.            F            E11.649        
                          

 

 2018            Ronni OBRIEN, C. S.            F            E83.39         
                         

 

 2018            Ronni OBRIEN, C. S.            F            F17.210        
                          

 

 2018            Ronni OBRIEN, C. S.            F            F32.9          
                        

 

 2018            Ronni OBRIEN, C. S.            F            G72.89         
                         

 

 2018            Ronni OBRIEN, C. S.            F            G95.89         
                         

 

 2018            Ronni OBRIEN, C. S.            F            I13.2          
                        

 

 2018            Ronni OBRIEN, C. S.            F            I25.10         
                         

 

 2018            Ronni OBRIEN, C. S.            F            I25.2          
                        

 

 2018            Ronni OBRIEN, C. S.            F            I50.9          
                        

 

 2018            Ronni OBRIEN, C. S.            F            M25.511        
                          

 

 2018            Ronni OBRIEN, C. S.            F            M25.551        
                          

 

 2018            Ronni OBRIEN, C. S.            F            M25.552        
                          

 

 2018            Ronni OBRIEN, C. S.            F            M48.061        
                          

 

 2018            Ronni OBRIEN, C. S.            F            M54.2          
                        

 

 2018            Ronni OBRIEN, C. S.            F            N18.6          
                        

 

 2018            Ronni OBRIEN, C. S.            F            R11.0          
                        

 

 2018            Ronni OBRIEN, C. S.            F            R53.1          
                        

 

 2018            Ronni OBRIEN, C. S.            F            Z79.4          
                        

 

 2018            Ronni OBRIEN, C. S.            F            Z99.2          
                        

 

 2018            Rohith Smith MD            Other            M48.00    
        M48.00 - Spinal stenosis, site unspecified                     

 

 2018            Rohith Smith MD            Other            N18.6     
       N18.6 - End stage renal disease                     

 

 2018            Rohith Smith MD            Other            R26.2     
       R26.2 - Difficulty in walking, not elsewhere classified                 
    

 

 2018            Rohith Smith MD            Other            R53.1     
       R53.1 - Weakness                     

 

 2018            Rohith Smith MD            Other            Z99.2     
       Z99.2 - Dependence on renal dialysis                     

 

 2018                         Other            M48.00            M48.00 - 
Spinal stenosis, site unspecified                     

 

 2018                         Other            N18.6            N18.6 - 
End stage renal disease                     

 

 2018                         Other            R26.2            R26.2 - 
Difficulty in walking, not elsewhere classified                     

 

 2018                         Other            R53.1            R53.1 - 
Weakness                     

 

 2018                         Other            Z99.2            Z99.2 - 
Dependence on renal dialysis                     

 

 2018                         Other            G72.89            G72.89 - 
Other specified myopathies                     

 

 2018                         Other            D63.1            D63.1 - 
Anemia in chronic kidney disease                     

 

 2018                         Other            E11.22            E11.22 - 
Type 2 diabetes mellitus with diabetic chronic kidney disease                  
   

 

 2018                         Other            G72.89            G72.89 - 
Other specified myopathies                     

 

 2018                         Other            I10            I10 - 
Essential (primary) hypertension                     

 

 2018                         Other            M25.511            M25.511 
- Pain in right shoulder                     

 

 2018                         Other            M48.061            M48.061 
- Spinal stenosis, lumbar region without neurogenic claudication               
      

 

 2018                         Other            N18.6            N18.6 - 
End stage renal disease                     

 

 2018                         Other            R53.1            R53.1 - 
Weakness                     

 

 2018                         Other            Z79.4            Z79.4 - 
Long term (current) use of insulin                     

 

 2018                         Other            Z99.2            Z99.2 - 
Dependence on renal dialysis                     

 

 2018            Ronni ORBIEN, C. S.            Other            D63.1      
      D63.1 - Anemia in chronic kidney disease                     

 

 2018            Ronni OBRIEN, C. S.            Other            E11.22     
       E11.22 - Type 2 diabetes mellitus with diabetic chronic kidney disease  
                   

 

 2018            Ronni OBRIEN, C. S.            Other            G72.89     
       G72.89 - Other specified myopathies                     

 

 2018            Ronni OBRIEN, C. S.            Other            I10        
    I10 - Essential (primary) hypertension                     

 

 2018            Ronni OBRIEN, C. S.            Other            M25.511    
        M25.511 - Pain in right shoulder                     

 

 2018            Ronni OBRIEN, C. S.            Other            M48.061    
        M48.061 - Spinal stenosis, lumbar region without neurogenic 
claudication                     

 

 2018            Ronni OBRIEN, C. S.            Other            N18.6      
      N18.6 - End stage renal disease                     

 

 2018            Ronni OBRIEN, C. S.            Other            R11.0      
      R11.0 - Nausea                     

 

 2018            Ronni OBRIEN, C. S.            Other            R53.1      
      R53.1 - Weakness                     

 

 2018            Ronni OBRIEN, C. S.            Other            Z79.4      
      Z79.4 - Long term (current) use of insulin                     

 

 2018            Ronni OBRIEN, C. S.            Other            Z99.2      
      Z99.2 - Dependence on renal dialysis                     

 

 2018                         Other            D63.1            D63.1 - 
Anemia in chronic kidney disease                     

 

 2018                         Other            E11.22            E11.22 - 
Type 2 diabetes mellitus with diabetic chronic kidney disease                  
   

 

 2018                         Other            G72.89            G72.89 - 
Other specified myopathies                     

 

 2018                         Other            I10            I10 - 
Essential (primary) hypertension                     

 

 2018                         Other            M25.511            M25.511 
- Pain in right shoulder                     

 

 2018                         Other            M48.061            M48.061 
- Spinal stenosis, lumbar region without neurogenic claudication               
      

 

 2018                         Other            N18.6            N18.6 - 
End stage renal disease                     

 

 2018                         Other            R11.0            R11.0 - 
Nausea                     

 

 2018                         Other            R53.1            R53.1 - 
Weakness                     

 

 2018                         Other            Z79.4            Z79.4 - 
Long term (current) use of insulin                     

 

 2018                         Other            Z99.2            Z99.2 - 
Dependence on renal dialysis                     

 

 2018                         Other            D63.1            D63.1 - 
Anemia in chronic kidney disease                     

 

 2018                         Other            E11.22            E11.22 - 
Type 2 diabetes mellitus with diabetic chronic kidney disease                  
   

 

 2018                         Other            G72.89            G72.89 - 
Other specified myopathies                     

 

 2018                         Other            I10            I10 - 
Essential (primary) hypertension                     

 

 2018                         Other            M25.511            M25.511 
- Pain in right shoulder                     

 

 2018                         Other            M48.061            M48.061 
- Spinal stenosis, lumbar region without neurogenic claudication               
      

 

 2018                         Other            N18.6            N18.6 - 
End stage renal disease                     

 

 2018                         Other            R11.0            R11.0 - 
Nausea                     

 

 2018                         Other            R53.1            R53.1 - 
Weakness                     

 

 2018                         Other            Z79.4            Z79.4 - 
Long term (current) use of insulin                     

 

 2018                         Other            Z99.2            Z99.2 - 
Dependence on renal dialysis                     

 

 2018                         Other            D63.1            D63.1 - 
Anemia in chronic kidney disease                     

 

 2018                         Other            E11.22            E11.22 - 
Type 2 diabetes mellitus with diabetic chronic kidney disease                  
   

 

 2018                         Other            G72.89            G72.89 - 
Other specified myopathies                     

 

 2018                         Other            I10            I10 - 
Essential (primary) hypertension                     

 

 2018                         Other            M25.511            M25.511 
- Pain in right shoulder                     

 

 2018                         Other            M48.061            M48.061 
- Spinal stenosis, lumbar region without neurogenic claudication               
      

 

 2018                         Other            N18.6            N18.6 - 
End stage renal disease                     

 

 2018                         Other            R11.0            R11.0 - 
Nausea                     

 

 2018                         Other            R53.1            R53.1 - 
Weakness                     

 

 2018                         Other            Z79.4            Z79.4 - 
Long term (current) use of insulin                     

 

 2018                         Other            Z99.2            Z99.2 - 
Dependence on renal dialysis                     

 

 2018                         Other            D63.1            D63.1 - 
Anemia in chronic kidney disease                     

 

 2018                         Other            E11.22            E11.22 - 
Type 2 diabetes mellitus with diabetic chronic kidney disease                  
   

 

 2018                         Other            G72.89            G72.89 - 
Other specified myopathies                     

 

 2018                         Other            I10            I10 - 
Essential (primary) hypertension                     

 

 2018                         Other            M25.511            M25.511 
- Pain in right shoulder                     

 

 2018                         Other            M48.061            M48.061 
- Spinal stenosis, lumbar region without neurogenic claudication               
      

 

 2018                         Other            N18.6            N18.6 - 
End stage renal disease                     

 

 2018                         Other            R11.0            R11.0 - 
Nausea                     

 

 2018                         Other            R53.1            R53.1 - 
Weakness                     

 

 2018                         Other            Z79.4            Z79.4 - 
Long term (current) use of insulin                     

 

 2018                         Other            Z99.2            Z99.2 - 
Dependence on renal dialysis                     

 

 2018            Ronni OBRIEN, C. S.            Other            D63.1      
      D63.1 - Anemia in chronic kidney disease                     

 

 2018            Ronni OBRIEN, C. S.            Other            E11.22     
       E11.22 - Type 2 diabetes mellitus with diabetic chronic kidney disease  
                   

 

 2018            Ronni OBRIEN, C. S.            Other            G72.89     
       G72.89 - Other specified myopathies                     

 

 2018            Ronni OBRIEN, C. S.            Other            I10        
    I10 - Essential (primary) hypertension                     

 

 2018            Ronni OBRIEN, C. S.            Other            M25.511    
        M25.511 - Pain in right shoulder                     

 

 2018            Ronni OBRIEN, C. S.            Other            M48.061    
        M48.061 - Spinal stenosis, lumbar region without neurogenic 
claudication                     

 

 2018            Ronni OBRIEN, C. S.            Other            N18.6      
      N18.6 - End stage renal disease                     

 

 2018            Ronni OBRIEN, C. S.            Other            R11.0      
      R11.0 - Nausea                     

 

 2018            Ronni OBRIEN, C. S.            Other            R53.1      
      R53.1 - Weakness                     

 

 2018            Ronni OBRIEN, C. S.            Other            Z79.4      
      Z79.4 - Long term (current) use of insulin                     

 

 2018            Ronni OBRIEN, C. S.            Other            Z99.2      
      Z99.2 - Dependence on renal dialysis                     

 

 2018                         Other            D63.1            D63.1 - 
Anemia in chronic kidney disease                     

 

 2018                         Other            E11.22            E11.22 - 
Type 2 diabetes mellitus with diabetic chronic kidney disease                  
   

 

 2018                         Other            G72.89            G72.89 - 
Other specified myopathies                     

 

 2018                         Other            I10            I10 - 
Essential (primary) hypertension                     

 

 2018                         Other            M25.511            M25.511 
- Pain in right shoulder                     

 

 2018                         Other            M48.061            M48.061 
- Spinal stenosis, lumbar region without neurogenic claudication               
      

 

 2018                         Other            N18.6            N18.6 - 
End stage renal disease                     

 

 2018                         Other            R11.0            R11.0 - 
Nausea                     

 

 2018                         Other            R53.1            R53.1 - 
Weakness                     

 

 2018                         Other            Z79.4            Z79.4 - 
Long term (current) use of insulin                     

 

 2018                         Other            Z99.2            Z99.2 - 
Dependence on renal dialysis                     

 

 2018            Ronni OBRIEN, C. S.            Other            D63.1      
      D63.1 - Anemia in chronic kidney disease                     

 

 2018            Ronni OBRIEN, C. S.            Other            E11.22     
       E11.22 - Type 2 diabetes mellitus with diabetic chronic kidney disease  
                   

 

 2018            Ronni OBRIEN, C. S.            Other            G72.89     
       G72.89 - Other specified myopathies                     

 

 2018            Ronni OBRIEN, C. S.            Other            I10        
    I10 - Essential (primary) hypertension                     

 

 2018            Ronni OBRIEN, C. S.            Other            M25.511    
        M25.511 - Pain in right shoulder                     

 

 2018            Ronni OBRIEN, C. S.            Other            M48.061    
        M48.061 - Spinal stenosis, lumbar region without neurogenic 
claudication                     

 

 2018            Ronni OBRIEN, C. S.            Other            N18.6      
      N18.6 - End stage renal disease                     

 

 2018            Ronni OBRIEN, C. S.            Other            R11.0      
      R11.0 - Nausea                     

 

 2018            Ronni OBRIEN, C. S.            Other            R53.1      
      R53.1 - Weakness                     

 

 2018            Ronni OBRIEN, C. S.            Other            Z79.4      
      Z79.4 - Long term (current) use of insulin                     

 

 2018            Ronni OBRIEN, C. S.            Other            Z99.2      
      Z99.2 - Dependence on renal dialysis                     

 

 12/10/2018                         Other            N18.6            N18.6 - 
End stage renal disease                     

 

 12/10/2018                         Other            R26.2            R26.2 - 
Difficulty in walking, not elsewhere classified                     

 

 12/10/2018                         Other            R53.1            R53.1 - 
Weakness                     

 

 12/10/2018                         Other            Z99.2            Z99.2 - 
Dependence on renal dialysis                     

 

 12/10/2018                         Other            M48.00            M48.00 - 
Spinal stenosis, site unspecified                     

 

 12/10/2018                         Other            N18.6            N18.6 - 
End stage renal disease                     

 

 12/10/2018                         Other            R26.2            R26.2 - 
Difficulty in walking, not elsewhere classified                     

 

 12/10/2018                         Other            R53.1            R53.1 - 
Weakness                     

 

 12/10/2018                         Other            Z99.2            Z99.2 - 
Dependence on renal dialysis                     

 

 12/10/2018                         Other            M48.00            M48.00 - 
Spinal stenosis, site unspecified                     

 

 12/10/2018                         Other            N18.6            N18.6 - 
End stage renal disease                     

 

 12/10/2018                         Other            R26.2            R26.2 - 
Difficulty in walking, not elsewhere classified                     

 

 12/10/2018                         Other            R53.1            R53.1 - 
Weakness                     

 

 12/10/2018                         Other            Z99.2            Z99.2 - 
Dependence on renal dialysis                     

 

 12/10/2018                         Other            M48.00            M48.00 - 
Spinal stenosis, site unspecified                     

 

 12/10/2018                         Other            N18.6            N18.6 - 
End stage renal disease                     

 

 12/10/2018                         Other            R26.2            R26.2 - 
Difficulty in walking, not elsewhere classified                     

 

 12/10/2018                         Other            R53.1            R53.1 - 
Weakness                     

 

 12/10/2018                         Other            Z99.2            Z99.2 - 
Dependence on renal dialysis                     

 

 12/10/2018                         Other            M48.00            M48.00 - 
Spinal stenosis, site unspecified                     

 

 12/10/2018                         Other            N18.6            N18.6 - 
End stage renal disease                     

 

 12/10/2018                         Other            R26.2            R26.2 - 
Difficulty in walking, not elsewhere classified                     

 

 12/10/2018                         Other            R53.1            R53.1 - 
Weakness                     

 

 12/10/2018                         Other            Z99.2            Z99.2 - 
Dependence on renal dialysis                     

 

 12/10/2018                         Other            M48.00            M48.00 - 
Spinal stenosis, site unspecified                     

 

 12/10/2018                         Other            N18.6            N18.6 - 
End stage renal disease                     

 

 12/10/2018                         Other            R26.2            R26.2 - 
Difficulty in walking, not elsewhere classified                     

 

 12/10/2018                         Other            R53.1            R53.1 - 
Weakness                     

 

 12/10/2018                         Other            Z99.2            Z99.2 - 
Dependence on renal dialysis                     

 

 12/10/2018                         Other            M48.00            M48.00 - 
Spinal stenosis, site unspecified                     

 

 12/10/2018                         Other            N18.6            N18.6 - 
End stage renal disease                     

 

 12/10/2018                         Other            R26.2            R26.2 - 
Difficulty in walking, not elsewhere classified                     

 

 12/10/2018                         Other            R53.1            R53.1 - 
Weakness                     

 

 12/10/2018                         Other            Z99.2            Z99.2 - 
Dependence on renal dialysis                     

 

 12/10/2018                         Other            G72.89            G72.89 - 
Other specified myopathies                     

 

 12/10/2018                         Other            D63.1            D63.1 - 
Anemia in chronic kidney disease                     

 

 12/10/2018                         Other            E11.22            E11.22 - 
Type 2 diabetes mellitus with diabetic chronic kidney disease                  
   

 

 12/10/2018                         Other            G72.89            G72.89 - 
Other specified myopathies                     

 

 12/10/2018                         Other            I10            I10 - 
Essential (primary) hypertension                     

 

 12/10/2018                         Other            M25.511            M25.511 
- Pain in right shoulder                     

 

 12/10/2018                         Other            M48.061            M48.061 
- Spinal stenosis, lumbar region without neurogenic claudication               
      

 

 12/10/2018                         Other            N18.6            N18.6 - 
End stage renal disease                     

 

 12/10/2018                         Other            R53.1            R53.1 - 
Weakness                     

 

 12/10/2018                         Other            Z79.4            Z79.4 - 
Long term (current) use of insulin                     

 

 12/10/2018                         Other            Z99.2            Z99.2 - 
Dependence on renal dialysis                     

 

 12/10/2018                         Other            D63.1            D63.1 - 
Anemia in chronic kidney disease                     

 

 12/10/2018                         Other            E11.22            E11.22 - 
Type 2 diabetes mellitus with diabetic chronic kidney disease                  
   

 

 12/10/2018                         Other            G72.89            G72.89 - 
Other specified myopathies                     

 

 12/10/2018                         Other            I10            I10 - 
Essential (primary) hypertension                     

 

 12/10/2018                         Other            M25.511            M25.511 
- Pain in right shoulder                     

 

 12/10/2018                         Other            M48.061            M48.061 
- Spinal stenosis, lumbar region without neurogenic claudication               
      

 

 12/10/2018                         Other            N18.6            N18.6 - 
End stage renal disease                     

 

 12/10/2018                         Other            R11.0            R11.0 - 
Nausea                     

 

 12/10/2018                         Other            R53.1            R53.1 - 
Weakness                     

 

 12/10/2018                         Other            Z79.4            Z79.4 - 
Long term (current) use of insulin                     

 

 12/10/2018                         Other            Z99.2            Z99.2 - 
Dependence on renal dialysis                     

 

 12/10/2018                         Other            D63.1            D63.1 - 
Anemia in chronic kidney disease                     

 

 12/10/2018                         Other            E11.22            E11.22 - 
Type 2 diabetes mellitus with diabetic chronic kidney disease                  
   

 

 12/10/2018                         Other            G72.89            G72.89 - 
Other specified myopathies                     

 

 12/10/2018                         Other            I10            I10 - 
Essential (primary) hypertension                     

 

 12/10/2018                         Other            M25.511            M25.511 
- Pain in right shoulder                     

 

 12/10/2018                         Other            M48.061            M48.061 
- Spinal stenosis, lumbar region without neurogenic claudication               
      

 

 12/10/2018                         Other            N18.6            N18.6 - 
End stage renal disease                     

 

 12/10/2018                         Other            R11.0            R11.0 - 
Nausea                     

 

 12/10/2018                         Other            R53.1            R53.1 - 
Weakness                     

 

 12/10/2018                         Other            Z79.4            Z79.4 - 
Long term (current) use of insulin                     

 

 12/10/2018                         Other            Z99.2            Z99.2 - 
Dependence on renal dialysis                     

 

 12/10/2018                         Other            D63.1            D63.1 - 
Anemia in chronic kidney disease                     

 

 12/10/2018                         Other            E11.22            E11.22 - 
Type 2 diabetes mellitus with diabetic chronic kidney disease                  
   

 

 12/10/2018                         Other            G72.89            G72.89 - 
Other specified myopathies                     

 

 12/10/2018                         Other            I10            I10 - 
Essential (primary) hypertension                     

 

 12/10/2018                         Other            M25.511            M25.511 
- Pain in right shoulder                     

 

 12/10/2018                         Other            M48.061            M48.061 
- Spinal stenosis, lumbar region without neurogenic claudication               
      

 

 12/10/2018                         Other            N18.6            N18.6 - 
End stage renal disease                     

 

 12/10/2018                         Other            R11.0            R11.0 - 
Nausea                     

 

 12/10/2018                         Other            R53.1            R53.1 - 
Weakness                     

 

 12/10/2018                         Other            Z79.4            Z79.4 - 
Long term (current) use of insulin                     

 

 12/10/2018                         Other            Z99.2            Z99.2 - 
Dependence on renal dialysis                     

 

 12/10/2018                         Other            D63.1            D63.1 - 
Anemia in chronic kidney disease                     

 

 12/10/2018                         Other            E11.22            E11.22 - 
Type 2 diabetes mellitus with diabetic chronic kidney disease                  
   

 

 12/10/2018                         Other            G72.89            G72.89 - 
Other specified myopathies                     

 

 12/10/2018                         Other            I10            I10 - 
Essential (primary) hypertension                     

 

 12/10/2018                         Other            M25.511            M25.511 
- Pain in right shoulder                     

 

 12/10/2018                         Other            M48.061            M48.061 
- Spinal stenosis, lumbar region without neurogenic claudication               
      

 

 12/10/2018                         Other            N18.6            N18.6 - 
End stage renal disease                     

 

 12/10/2018                         Other            R11.0            R11.0 - 
Nausea                     

 

 12/10/2018                         Other            R53.1            R53.1 - 
Weakness                     

 

 12/10/2018                         Other            Z79.4            Z79.4 - 
Long term (current) use of insulin                     

 

 12/10/2018                         Other            Z99.2            Z99.2 - 
Dependence on renal dialysis                     

 

 12/10/2018                         Other            D63.1            D63.1 - 
Anemia in chronic kidney disease                     

 

 12/10/2018                         Other            E11.22            E11.22 - 
Type 2 diabetes mellitus with diabetic chronic kidney disease                  
   

 

 12/10/2018                         Other            G72.89            G72.89 - 
Other specified myopathies                     

 

 12/10/2018                         Other            I10            I10 - 
Essential (primary) hypertension                     

 

 12/10/2018                         Other            M25.511            M25.511 
- Pain in right shoulder                     

 

 12/10/2018                         Other            M48.061            M48.061 
- Spinal stenosis, lumbar region without neurogenic claudication               
      

 

 12/10/2018                         Other            N18.6            N18.6 - 
End stage renal disease                     

 

 12/10/2018                         Other            R11.0            R11.0 - 
Nausea                     

 

 12/10/2018                         Other            R53.1            R53.1 - 
Weakness                     

 

 12/10/2018                         Other            Z79.4            Z79.4 - 
Long term (current) use of insulin                     

 

 12/10/2018                         Other            Z99.2            Z99.2 - 
Dependence on renal dialysis                     

 

 2018                         Other            N18.6            N18.6 - 
End stage renal disease                     

 

 2018                         Other            R26.2            R26.2 - 
Difficulty in walking, not elsewhere classified                     

 

 2018                         Other            R53.1            R53.1 - 
Weakness                     

 

 2018                         Other            Z99.2            Z99.2 - 
Dependence on renal dialysis                     

 

 2018                         Other            M48.00            M48.00 - 
Spinal stenosis, site unspecified                     

 

 2018                         Other            N18.6            N18.6 - 
End stage renal disease                     

 

 2018                         Other            R26.2            R26.2 - 
Difficulty in walking, not elsewhere classified                     

 

 2018                         Other            R53.1            R53.1 - 
Weakness                     

 

 2018                         Other            Z99.2            Z99.2 - 
Dependence on renal dialysis                     

 

 2018                         Other            M48.00            M48.00 - 
Spinal stenosis, site unspecified                     

 

 2018                         Other            N18.6            N18.6 - 
End stage renal disease                     

 

 2018                         Other            R26.2            R26.2 - 
Difficulty in walking, not elsewhere classified                     

 

 2018                         Other            R53.1            R53.1 - 
Weakness                     

 

 2018                         Other            Z99.2            Z99.2 - 
Dependence on renal dialysis                     

 

 2018                         Other            M48.00            M48.00 - 
Spinal stenosis, site unspecified                     

 

 2018                         Other            N18.6            N18.6 - 
End stage renal disease                     

 

 2018                         Other            R26.2            R26.2 - 
Difficulty in walking, not elsewhere classified                     

 

 2018                         Other            R53.1            R53.1 - 
Weakness                     

 

 2018                         Other            Z99.2            Z99.2 - 
Dependence on renal dialysis                     

 

 2018                         Other            M48.00            M48.00 - 
Spinal stenosis, site unspecified                     

 

 2018                         Other            N18.6            N18.6 - 
End stage renal disease                     

 

 2018                         Other            R26.2            R26.2 - 
Difficulty in walking, not elsewhere classified                     

 

 2018                         Other            R53.1            R53.1 - 
Weakness                     

 

 2018                         Other            Z99.2            Z99.2 - 
Dependence on renal dialysis                     

 

 2018                         Other            M48.00            M48.00 - 
Spinal stenosis, site unspecified                     

 

 2018                         Other            N18.6            N18.6 - 
End stage renal disease                     

 

 2018                         Other            R26.2            R26.2 - 
Difficulty in walking, not elsewhere classified                     

 

 2018                         Other            R53.1            R53.1 - 
Weakness                     

 

 2018                         Other            Z99.2            Z99.2 - 
Dependence on renal dialysis                     

 

 2018                         Other            M48.00            M48.00 - 
Spinal stenosis, site unspecified                     

 

 2018                         Other            N18.6            N18.6 - 
End stage renal disease                     

 

 2018                         Other            R26.2            R26.2 - 
Difficulty in walking, not elsewhere classified                     

 

 2018                         Other            R53.1            R53.1 - 
Weakness                     

 

 2018                         Other            Z99.2            Z99.2 - 
Dependence on renal dialysis                     

 

 2018                         Other            G72.89            G72.89 - 
Other specified myopathies                     

 

 2018                         Other            D63.1            D63.1 - 
Anemia in chronic kidney disease                     

 

 2018                         Other            E11.22            E11.22 - 
Type 2 diabetes mellitus with diabetic chronic kidney disease                  
   

 

 2018                         Other            G72.89            G72.89 - 
Other specified myopathies                     

 

 2018                         Other            I10            I10 - 
Essential (primary) hypertension                     

 

 2018                         Other            M25.511            M25.511 
- Pain in right shoulder                     

 

 2018                         Other            M48.061            M48.061 
- Spinal stenosis, lumbar region without neurogenic claudication               
      

 

 2018                         Other            N18.6            N18.6 - 
End stage renal disease                     

 

 2018                         Other            R53.1            R53.1 - 
Weakness                     

 

 2018                         Other            Z79.4            Z79.4 - 
Long term (current) use of insulin                     

 

 2018                         Other            Z99.2            Z99.2 - 
Dependence on renal dialysis                     

 

 2018                         Other            D63.1            D63.1 - 
Anemia in chronic kidney disease                     

 

 2018                         Other            E11.22            E11.22 - 
Type 2 diabetes mellitus with diabetic chronic kidney disease                  
   

 

 2018                         Other            G72.89            G72.89 - 
Other specified myopathies                     

 

 2018                         Other            I10            I10 - 
Essential (primary) hypertension                     

 

 2018                         Other            M25.511            M25.511 
- Pain in right shoulder                     

 

 2018                         Other            M48.061            M48.061 
- Spinal stenosis, lumbar region without neurogenic claudication               
      

 

 2018                         Other            N18.6            N18.6 - 
End stage renal disease                     

 

 2018                         Other            R11.0            R11.0 - 
Nausea                     

 

 2018                         Other            R53.1            R53.1 - 
Weakness                     

 

 2018                         Other            Z79.4            Z79.4 - 
Long term (current) use of insulin                     

 

 2018                         Other            Z99.2            Z99.2 - 
Dependence on renal dialysis                     

 

 2018                         Other            D63.1            D63.1 - 
Anemia in chronic kidney disease                     

 

 2018                         Other            E11.22            E11.22 - 
Type 2 diabetes mellitus with diabetic chronic kidney disease                  
   

 

 2018                         Other            G72.89            G72.89 - 
Other specified myopathies                     

 

 2018                         Other            I10            I10 - 
Essential (primary) hypertension                     

 

 2018                         Other            M25.511            M25.511 
- Pain in right shoulder                     

 

 2018                         Other            M48.061            M48.061 
- Spinal stenosis, lumbar region without neurogenic claudication               
      

 

 2018                         Other            N18.6            N18.6 - 
End stage renal disease                     

 

 2018                         Other            R11.0            R11.0 - 
Nausea                     

 

 2018                         Other            R53.1            R53.1 - 
Weakness                     

 

 2018                         Other            Z79.4            Z79.4 - 
Long term (current) use of insulin                     

 

 2018                         Other            Z99.2            Z99.2 - 
Dependence on renal dialysis                     

 

 2018                         Other            D63.1            D63.1 - 
Anemia in chronic kidney disease                     

 

 2018                         Other            E11.22            E11.22 - 
Type 2 diabetes mellitus with diabetic chronic kidney disease                  
   

 

 2018                         Other            G72.89            G72.89 - 
Other specified myopathies                     

 

 2018                         Other            I10            I10 - 
Essential (primary) hypertension                     

 

 2018                         Other            M25.511            M25.511 
- Pain in right shoulder                     

 

 2018                         Other            M48.061            M48.061 
- Spinal stenosis, lumbar region without neurogenic claudication               
      

 

 2018                         Other            N18.6            N18.6 - 
End stage renal disease                     

 

 2018                         Other            R11.0            R11.0 - 
Nausea                     

 

 2018                         Other            R53.1            R53.1 - 
Weakness                     

 

 2018                         Other            Z79.4            Z79.4 - 
Long term (current) use of insulin                     

 

 2018                         Other            Z99.2            Z99.2 - 
Dependence on renal dialysis                     

 

 2018                         Other            D63.1            D63.1 - 
Anemia in chronic kidney disease                     

 

 2018                         Other            E11.22            E11.22 - 
Type 2 diabetes mellitus with diabetic chronic kidney disease                  
   

 

 2018                         Other            G72.89            G72.89 - 
Other specified myopathies                     

 

 2018                         Other            I10            I10 - 
Essential (primary) hypertension                     

 

 2018                         Other            M25.511            M25.511 
- Pain in right shoulder                     

 

 2018                         Other            M48.061            M48.061 
- Spinal stenosis, lumbar region without neurogenic claudication               
      

 

 2018                         Other            N18.6            N18.6 - 
End stage renal disease                     

 

 2018                         Other            R11.0            R11.0 - 
Nausea                     

 

 2018                         Other            R53.1            R53.1 - 
Weakness                     

 

 2018                         Other            Z79.4            Z79.4 - 
Long term (current) use of insulin                     

 

 2018                         Other            Z99.2            Z99.2 - 
Dependence on renal dialysis                     

 

 2018                         Other            D63.1            D63.1 - 
Anemia in chronic kidney disease                     

 

 2018                         Other            E11.22            E11.22 - 
Type 2 diabetes mellitus with diabetic chronic kidney disease                  
   

 

 2018                         Other            G72.89            G72.89 - 
Other specified myopathies                     

 

 2018                         Other            I10            I10 - 
Essential (primary) hypertension                     

 

 2018                         Other            M25.511            M25.511 
- Pain in right shoulder                     

 

 2018                         Other            M48.061            M48.061 
- Spinal stenosis, lumbar region without neurogenic claudication               
      

 

 2018                         Other            N18.6            N18.6 - 
End stage renal disease                     

 

 2018                         Other            R11.0            R11.0 - 
Nausea                     

 

 2018                         Other            R53.1            R53.1 - 
Weakness                     

 

 2018                         Other            Z79.4            Z79.4 - 
Long term (current) use of insulin                     

 

 2018                         Other            Z99.2            Z99.2 - 
Dependence on renal dialysis                     

 

 2018                         Other            G72.89            G72.89 - 
Other specified myopathies                     

 

 2019                         Other            G72.89            G72.89 - 
Other specified myopathies                     

 

 2019            RADHA FARAH DO            M25.552      
                            

 

 2019            RADHA FARAH DO            S93.402A     
                             

 

 2019            RADHA FARAH DO            Z79.82       
                           

 

 2019            RADHA FARAH DO            Z79.899      
                            



                                                                               
                                                                               
                                                                               
                                                                               
                                                                               
                                                                               
                                                                               
                                                                               
                                                                               
                                                                               
                                                                               
                                                                               
                                                                               
                                                                               
                                                                          



Procedures

      





 Code            Description            Performed By            Performed On   
     

 

             [id number]                                  INSPECTION OF UPPER 
INTESTINAL TRACT, ENDO                                   02/15/2016        

 

             [id number]                                  INSPECTION OF UPPER 
INTESTINAL TRACT, ENDO                                   02/15/2016        

 

             [id number]                                  DRAINAGE OF LEFT PLEURAL 
CAVITY, PERC APPROACH, DI                                   2016        

 

             [id number]                                  MEASURE OF CARDIAC SAMPL 
  PRESSURE, L HEART, PERC                                   2016        

 

             [id number]                                  FLUOROSCOPY OF MULT COR 
ART USING L OSM CONTRAST                                   2016        

 

             [id number]                                  DRAINAGE OF LEFT PLEURAL 
CAVITY, PERC APPROACH, DI                                   2016        

 

             [id number]                                  MEASURE OF CARDIAC SAMPL 
  PRESSURE, L HEART, PERC                                   2016        

 

             [id number]                                  FLUOROSCOPY OF MULT COR 
ART USING L OSM CONTRAST                                   2016        

 

             [id number]                                  INSPECTION OF UPPER 
INTESTINAL TRACT, ENDO                                   2017        

 

             [id number]                                  EXCISION OF RIGHT KIDNEY, 
PERCUTANEOUS APPROACH, D                                   2017        

 

             [id number]                                  PERFORMANCE OF URINARY 
FILTRATION, MULTIPLE                                   2017        

 

             [id number]                                                           
          2018        

 

             [id number]                                                           
          2018        

 

             [id number]                                                           
          2018        

 

             [id number]                                                           
          2018        

 

             [id number]                                                           
          2018        



                                                



Results

      





 Test            Result            Range        









 Complete Blood Count with Diff - 18 14:55         









 DIFF TYPE            Automated             NRG        

 

 WBC # Bld Auto            =11.5             4.0-11.0        

 

 RBC # Bld Auto            =3.42             4.00-5.20        

 

 Hgb Bld-mCnc            =10.8             12.0-16.0        

 

 Hct VFr Bld Auto            =31.9             36.0-46.0        

 

 MCV RBC Auto            =93.3             80.0-100.0        

 

 MCH RBC Qn Auto            =31.6             26.0-34.0        

 

 MCHC RBC Auto-mCnc            =33.9             31.0-37.0        

 

 RDW RBC Auto-Rto            =13.4             11.0-15.0        

 

 Platelet # Bld Auto            =163             140-450        

 

 PMV Bld Auto            =11.0             8.2-12.4        

 

 nRBC/100 WBC Bld Auto-Rto            =0.0             0.0-0.0        

 

 nRBC # Bld Auto            =0.0             0.0-0.0        

 

 Neutrophils/leuk NFr Bld Auto            =69.9             40.0-70.0        

 

 Lymphocytes/leuk NFr Bld Auto            =20.2             15.0-45.0        

 

 Monocytes/leuk NFr Bld Auto            =7.1             2.0-10.0        

 

 Eosinophil/leuk NFr Bld Auto            =1.9             0.0-6.0        

 

 Basophils/leuk NFr Bld Auto            =0.3             0.0-1.0        

 

 Imm Granulocytes/leuk NFr Bld Auto            =0.6             0.0-0.0        

 

 Neutrophils # Bld Auto            =8.0             2.5-7.5        

 

 Lymphocytes # Bld Auto            =2.3             1.0-4.0        

 

 Monocytes # Bld Auto            =0.8             0.2-0.8        

 

 Eosinophil # Bld Auto            =0.2             0.0-0.4        

 

 Basophils # Bld Auto            =0.0             0.0-0.1        

 

 Imm Granulocytes # Bld Auto            =0.1             0.0-0.0        









 Comprehensive Metabolic Panel - 18 14:55         









 OSMO CALCULATED            =281.4             270.0-290.0        

 

 Sodium SerPl-sCnc            =137             135-145        

 

 Potassium SerPl-sCnc            =4.3             3.6-5.0        

 

 Chloride SerPl-sCnc            =96             101-111        

 

 CO2 SerPl-sCnc            =29             21-31        

 

 Anion Gap4 SerPl-sCnc            =16             8-18        

 

 BUN SerPl-mCnc            =32             6-20        

 

 Creat SerPl-mCnc            =4.31             0.50-1.20        

 

 Creatinine Clr Calc Pharmacy            =13.1568             NRG        

 

 GFR/BSA.pred SerPl CKD-EPI-ArVRat            =11             >=60        

 

 BUN/Creat SerPl            =7.0             10.0-20.0        

 

 Glucose SerPl-mCnc            =110                     

 

 Calcium SerPl-mCnc            =8.6             8.5-10.5        

 

 Bilirub SerPl-mCnc            =0.5             0.1-1.2        

 

 AST SerPl-cCnc            =15             10-42        

 

 ALT SerPl w/o P-5'-P-cCnc            =14             10-60        

 

 ALP SerPl-cCnc            =57                     

 

 Prot SerPl-mCnc            =6.8             6.4-8.2        

 

 Albumin SerPl-mCnc            =3.6             3.5-5.5        

 

 Globulin Ser Calc-mCnc            =3.2             2.4-3.6        

 

 Albumin/Glob SerPl            =1.1             0.9-1.8        









 Deprecated Phosphate SerPl-mCnc - 18 14:55         









 Deprecated Phosphate SerPl-mCnc            =3.4             2.5-4.6        









 Magnesium SerPl-mCnc - 18 14:55         









 Magnesium SerPl-mCnc            =2.1             1.8-2.5        









 CK SerPl-cCnc - 18 14:55         









 CK SerPl-cCnc            =151                     









 UA with screen for culture - 18 15:40         









 URINE CULTURE            Not Indicated             NRG        

 

 URINE MICROSCOPIC REQUIRED            Yes             NRG        

 

 Color Ur Auto            LIGHT YELLOW             Yellow        

 

 Clarity Ur Refract.auto            Clear             Clear        

 

 Glucose Ur Strip.auto-mCnc            =300             Negative        

 

 Bilirub Ur Strip.auto-mCnc            Negative             Negative        

 

 Ketones Ur Strip.auto-mCnc            Negative             Negative        

 

 Sp Gr Ur Refract.auto            =1.013             1.001-1.035        

 

 Hgb Ur Strip.auto-mCnc            Negative             Negative        

 

 pH Ur Strip.auto            =8.5             5.0-9.0        

 

 Prot Ur Strip.auto-mCnc            >1000             <20        

 

 Urobilinogen Ur Strip.auto-mCnc            0.2             0.2-1.0        

 

 Nitrite Ur Ql Strip            Negative             Negative        

 

 Leukocyte esterase Ur-aCnc            Negative             Negative        

 

 RBC # Ur Auto            0             0-3        

 

 WBC # Ur Auto            4             0-3        

 

 Squamous # Ur Auto            8             0-3        

 

 Hyaline Casts # Ur Auto            0             0-3        

 

 Bacteria # Ur Auto            1+             Negative        









 APAP SerPl-mCnc - 18 17:45         









 APAP SerPl-mCnc            =19.6             10-30        









 MRSA XXX Ql Cult - 18 20:23         









 MRSA XXX Ql Cult            NMRSA             NRG        









 Complete Blood Count with Diff - 18 06:27         









 DIFF TYPE            Automated             NRG        

 

 WBC # Bld Auto            =7.4             4.0-11.0        

 

 RBC # Bld Auto            =3.18             4.00-5.20        

 

 Hgb Bld-mCnc            =9.9             12.0-16.0        

 

 Hct VFr Bld Auto            =30.5             36.0-46.0        

 

 MCV RBC Auto            =95.9             80.0-100.0        

 

 MCH RBC Qn Auto            =31.1             26.0-34.0        

 

 MCHC RBC Auto-mCnc            =32.5             31.0-37.0        

 

 RDW RBC Auto-Rto            =13.6             11.0-15.0        

 

 Platelet # Bld Auto            =151             140-450        

 

 PMV Bld Auto            =11.3             8.2-12.4        

 

 nRBC/100 WBC Bld Auto-Rto            =0.0             0.0-0.0        

 

 nRBC # Bld Auto            =0.0             0.0-0.0        

 

 Neutrophils/leuk NFr Bld Auto            =59.7             40.0-70.0        

 

 Lymphocytes/leuk NFr Bld Auto            =29.0             15.0-45.0        

 

 Monocytes/leuk NFr Bld Auto            =7.9             2.0-10.0        

 

 Eosinophil/leuk NFr Bld Auto            =2.6             0.0-6.0        

 

 Basophils/leuk NFr Bld Auto            =0.3             0.0-1.0        

 

 Imm Granulocytes/leuk NFr Bld Auto            =0.5             0.0-0.0        

 

 Neutrophils # Bld Auto            =4.4             2.5-7.5        

 

 Lymphocytes # Bld Auto            =2.1             1.0-4.0        

 

 Monocytes # Bld Auto            =0.6             0.2-0.8        

 

 Eosinophil # Bld Auto            =0.2             0.0-0.4        

 

 Basophils # Bld Auto            =0.0             0.0-0.1        

 

 Imm Granulocytes # Bld Auto            =0.0             0.0-0.0        









 Renal Function Panel - 18 06:27         









 Sodium SerPl-sCnc            =139             135-145        

 

 Potassium SerPl-sCnc            =3.7             3.6-5.0        

 

 Chloride SerPl-sCnc            =99             101-111        

 

 CO2 SerPl-sCnc            =31             21-31        

 

 BUN SerPl-mCnc            =47             6-20        

 

 Creat SerPl-mCnc            =5.34             0.50-1.20        

 

 Creatinine Clr Calc Pharmacy            =10.6190             NRG        

 

 GFR/BSA.pred SerPl CKD-EPI-ArVRat            =8             >=60        

 

 Glucose SerPl-mCnc            =78                     

 

 Calcium SerPl-mCnc            =8.2             8.5-10.5        

 

 Albumin SerPl-mCnc            =3.2             3.5-5.5        

 

 Deprecated Phosphate SerPl-mCnc            =3.9             2.5-4.6        









 Magnesium SerPl-mCnc - 18 06:27         









 Magnesium SerPl-mCnc            =2.2             1.8-2.5        









 Complete Blood Count with Diff - 18 06:39         









 DIFF TYPE            Automated             NRG        

 

 WBC # Bld Auto            =6.9             4.0-11.0        

 

 RBC # Bld Auto            =3.11             4.00-5.20        

 

 Hgb Bld-mCnc            =9.9             12.0-16.0        

 

 Hct VFr Bld Auto            =29.4             36.0-46.0        

 

 MCV RBC Auto            =94.5             80.0-100.0        

 

 MCH RBC Qn Auto            =31.8             26.0-34.0        

 

 MCHC RBC Auto-mCnc            =33.7             31.0-37.0        

 

 RDW RBC Auto-Rto            =13.5             11.0-15.0        

 

 Platelet # Bld Auto            =145             140-450        

 

 PMV Bld Auto            =11.1             8.2-12.4        

 

 nRBC/100 WBC Bld Auto-Rto            =0.0             0.0-0.0        

 

 nRBC # Bld Auto            =0.0             0.0-0.0        

 

 Neutrophils/leuk NFr Bld Auto            =53.9             40.0-70.0        

 

 Lymphocytes/leuk NFr Bld Auto            =33.3             15.0-45.0        

 

 Monocytes/leuk NFr Bld Auto            =9.2             2.0-10.0        

 

 Eosinophil/leuk NFr Bld Auto            =2.9             0.0-6.0        

 

 Basophils/leuk NFr Bld Auto            =0.4             0.0-1.0        

 

 Imm Granulocytes/leuk NFr Bld Auto            =0.3             0.0-0.0        

 

 Neutrophils # Bld Auto            =3.7             2.5-7.5        

 

 Lymphocytes # Bld Auto            =2.3             1.0-4.0        

 

 Monocytes # Bld Auto            =0.6             0.2-0.8        

 

 Eosinophil # Bld Auto            =0.2             0.0-0.4        

 

 Basophils # Bld Auto            =0.0             0.0-0.1        

 

 Imm Granulocytes # Bld Auto            =0.0             0.0-0.0        









 Renal Function Panel - 18 06:39         









 Sodium SerPl-sCnc            =139             135-145        

 

 Potassium SerPl-sCnc            =3.7             3.6-5.0        

 

 Chloride SerPl-sCnc            =98             101-111        

 

 CO2 SerPl-sCnc            =31             21-31        

 

 BUN SerPl-mCnc            =28             6-20        

 

 Creat SerPl-mCnc            =4.21             0.50-1.20        

 

 Creatinine Clr Calc Pharmacy            =13.4693             NRG        

 

 GFR/BSA.pred SerPl CKD-EPI-ArVRat            =11             >=60        

 

 Glucose SerPl-mCnc            =96                     

 

 Calcium SerPl-mCnc            =8.4             8.5-10.5        

 

 Albumin SerPl-mCnc            =3.0             3.5-5.5        

 

 Deprecated Phosphate SerPl-mCnc            =3.3             2.5-4.6        









 Magnesium SerPl-mCnc - 18 06:39         









 Magnesium SerPl-mCnc            =2.0             1.8-2.5        









 Complete Blood Count with Diff - 18 06:14         









 DIFF TYPE            Automated             NRG        

 

 WBC # Bld Auto            =6.4             4.0-11.0        

 

 RBC # Bld Auto            =2.93             4.00-5.20        

 

 Hgb Bld-mCnc            =9.1             12.0-16.0        

 

 Hct VFr Bld Auto            =27.9             36.0-46.0        

 

 MCV RBC Auto            =95.2             80.0-100.0        

 

 MCH RBC Qn Auto            =31.1             26.0-34.0        

 

 MCHC RBC Auto-mCnc            =32.6             31.0-37.0        

 

 RDW RBC Auto-Rto            =13.4             11.0-15.0        

 

 Platelet # Bld Auto            =139             140-450        

 

 PMV Bld Auto            =11.5             8.2-12.4        

 

 nRBC/100 WBC Bld Auto-Rto            =0.0             0.0-0.0        

 

 nRBC # Bld Auto            =0.0             0.0-0.0        

 

 Neutrophils/leuk NFr Bld Auto            =52.0             40.0-70.0        

 

 Lymphocytes/leuk NFr Bld Auto            =35.0             15.0-45.0        

 

 Monocytes/leuk NFr Bld Auto            =9.7             2.0-10.0        

 

 Eosinophil/leuk NFr Bld Auto            =2.5             0.0-6.0        

 

 Basophils/leuk NFr Bld Auto            =0.5             0.0-1.0        

 

 Imm Granulocytes/leuk NFr Bld Auto            =0.3             0.0-0.0        

 

 Neutrophils # Bld Auto            =3.3             2.5-7.5        

 

 Lymphocytes # Bld Auto            =2.2             1.0-4.0        

 

 Monocytes # Bld Auto            =0.6             0.2-0.8        

 

 Eosinophil # Bld Auto            =0.2             0.0-0.4        

 

 Basophils # Bld Auto            =0.0             0.0-0.1        

 

 Imm Granulocytes # Bld Auto            =0.0             0.0-0.0        









 Renal Function Panel - 18 06:14         









 Sodium SerPl-sCnc            =138             135-145        

 

 Potassium SerPl-sCnc            =4.1             3.6-5.0        

 

 Chloride SerPl-sCnc            =98             101-111        

 

 CO2 SerPl-sCnc            =29             21-31        

 

 BUN SerPl-mCnc            =48             6-20        

 

 Creat SerPl-mCnc            =6.13             0.50-1.20        

 

 Creatinine Clr Calc Pharmacy            =9.2505             NRG        

 

 GFR/BSA.pred SerPl CKD-EPI-ArVRat            =7             >=60        

 

 Glucose SerPl-mCnc            =97                     

 

 Calcium SerPl-mCnc            =8.2             8.5-10.5        

 

 Albumin SerPl-mCnc            =2.9             3.5-5.5        

 

 Deprecated Phosphate SerPl-mCnc            =4.1             2.5-4.6        









 Magnesium SerPl-mCnc - 18 06:14         









 Magnesium SerPl-mCnc            =2.4             1.8-2.5        









 Complete Blood Count with Diff - 18 06:00         









 DIFF TYPE            Automated             NRG        

 

 WBC # Bld Auto            =7.8             4.0-11.0        

 

 RBC # Bld Auto            =2.71             4.00-5.20        

 

 Hgb Bld-mCnc            =8.3             12.0-16.0        

 

 Hct VFr Bld Auto            =26.3             36.0-46.0        

 

 MCV RBC Auto            =97.0             80.0-100.0        

 

 MCH RBC Qn Auto            =30.6             26.0-34.0        

 

 MCHC RBC Auto-mCnc            =31.6             31.0-37.0        

 

 RDW RBC Auto-Rto            =13.6             11.0-15.0        

 

 Platelet # Bld Auto            =141             140-450        

 

 PMV Bld Auto            =11.5             8.2-12.4        

 

 nRBC/100 WBC Bld Auto-Rto            =0.0             0.0-0.0        

 

 nRBC # Bld Auto            =0.0             0.0-0.0        

 

 Neutrophils/leuk NFr Bld Auto            =62.1             40.0-70.0        

 

 Lymphocytes/leuk NFr Bld Auto            =25.3             15.0-45.0        

 

 Monocytes/leuk NFr Bld Auto            =9.1             2.0-10.0        

 

 Eosinophil/leuk NFr Bld Auto            =2.7             0.0-6.0        

 

 Basophils/leuk NFr Bld Auto            =0.3             0.0-1.0        

 

 Imm Granulocytes/leuk NFr Bld Auto            =0.5             0.0-0.0        

 

 Neutrophils # Bld Auto            =4.8             2.5-7.5        

 

 Lymphocytes # Bld Auto            =2.0             1.0-4.0        

 

 Monocytes # Bld Auto            =0.7             0.2-0.8        

 

 Eosinophil # Bld Auto            =0.2             0.0-0.4        

 

 Basophils # Bld Auto            =0.0             0.0-0.1        

 

 Imm Granulocytes # Bld Auto            =0.0             0.0-0.0        









 Basic Metabolic Panel - 18 06:00         









 OSMO CALCULATED            =280.1             270.0-290.0        

 

 Sodium SerPl-sCnc            =136             135-145        

 

 Potassium SerPl-sCnc            =4.6             3.6-5.0        

 

 Chloride SerPl-sCnc            =96             101-111        

 

 CO2 SerPl-sCnc            =31             21-31        

 

 Anion Gap4 SerPl-sCnc            =14             8-18        

 

 BUN SerPl-mCnc            =39             6-20        

 

 Creat SerPl-mCnc            =5.05             0.50-1.20        

 

 Creatinine Clr Calc Pharmacy            =11.2288             NRG        

 

 GFR/BSA.pred SerPl CKD-EPI-ArVRat            =9             >=60        

 

 BUN/Creat SerPl            =8.0             10.0-20.0        

 

 Glucose SerPl-mCnc            =75                     

 

 Calcium SerPl-mCnc            =8.5             8.5-10.5        









 Complete Blood Count with Diff - 18 07:00         









 DIFF TYPE            Automated             NRG        

 

 WBC # Bld Auto            =8.3             4.0-11.0        

 

 RBC # Bld Auto            =2.38             4.00-5.20        

 

 Hgb Bld-mCnc            =7.5             12.0-16.0        

 

 Hct VFr Bld Auto            =23.2             36.0-46.0        

 

 MCV RBC Auto            =97.5             80.0-100.0        

 

 MCH RBC Qn Auto            =31.5             26.0-34.0        

 

 MCHC RBC Auto-mCnc            =32.3             31.0-37.0        

 

 RDW RBC Auto-Rto            =13.5             11.0-15.0        

 

 Platelet # Bld Auto            =157             140-450        

 

 PMV Bld Auto            =11.2             8.2-12.4        

 

 nRBC/100 WBC Bld Auto-Rto            =0.0             0.0-0.0        

 

 nRBC # Bld Auto            =0.0             0.0-0.0        

 

 Neutrophils/leuk NFr Bld Auto            =66.4             40.0-70.0        

 

 Lymphocytes/leuk NFr Bld Auto            =22.2             15.0-45.0        

 

 Monocytes/leuk NFr Bld Auto            =7.8             2.0-10.0        

 

 Eosinophil/leuk NFr Bld Auto            =3.0             0.0-6.0        

 

 Basophils/leuk NFr Bld Auto            =0.2             0.0-1.0        

 

 Imm Granulocytes/leuk NFr Bld Auto            =0.4             0.0-0.0        

 

 Neutrophils # Bld Auto            =5.5             2.5-7.5        

 

 Lymphocytes # Bld Auto            =1.9             1.0-4.0        

 

 Monocytes # Bld Auto            =0.7             0.2-0.8        

 

 Eosinophil # Bld Auto            =0.3             0.0-0.4        

 

 Basophils # Bld Auto            =0.0             0.0-0.1        

 

 Imm Granulocytes # Bld Auto            =0.0             0.0-0.0        









 Basic Metabolic Panel - 18 07:00         









 OSMO CALCULATED            =269.1             270.0-290.0        

 

 Sodium SerPl-sCnc            =131             135-145        

 

 Potassium SerPl-sCnc            =5.6             3.6-5.0        

 

 Chloride SerPl-sCnc            =95             101-111        

 

 CO2 SerPl-sCnc            =27             21-31        

 

 Anion Gap4 SerPl-sCnc            =11             8-18        

 

 BUN SerPl-mCnc            =48             6-20        

 

 Creat SerPl-mCnc            =5.81             0.50-1.20        

 

 Creatinine Clr Calc Pharmacy            =9.7600             NRG        

 

 GFR/BSA.pred SerPl CKD-EPI-ArVRat            =7             >=60        

 

 BUN/Creat SerPl            =8.0             10.0-20.0        

 

 Glucose SerPl-mCnc            =122                     

 

 Calcium SerPl-mCnc            =8.0             8.5-10.5        









 Complete Blood Count with Diff - 18 05:53         









 DIFF TYPE            Automated             NRG        

 

 WBC # Bld Auto            =6.8             4.0-11.0        

 

 RBC # Bld Auto            =2.68             4.00-5.20        

 

 Hgb Bld-mCnc            =8.5             12.0-16.0        

 

 Hct VFr Bld Auto            =26.3             36.0-46.0        

 

 MCV RBC Auto            =98.1             80.0-100.0        

 

 MCH RBC Qn Auto            =31.7             26.0-34.0        

 

 MCHC RBC Auto-mCnc            =32.3             31.0-37.0        

 

 RDW RBC Auto-Rto            =13.2             11.0-15.0        

 

 Platelet # Bld Auto            =179             140-450        

 

 PMV Bld Auto            =10.6             8.2-12.4        

 

 nRBC/100 WBC Bld Auto-Rto            =0.0             0.0-0.0        

 

 nRBC # Bld Auto            =0.0             0.0-0.0        

 

 Neutrophils/leuk NFr Bld Auto            =60.0             40.0-70.0        

 

 Lymphocytes/leuk NFr Bld Auto            =26.9             15.0-45.0        

 

 Monocytes/leuk NFr Bld Auto            =9.2             2.0-10.0        

 

 Eosinophil/leuk NFr Bld Auto            =3.1             0.0-6.0        

 

 Basophils/leuk NFr Bld Auto            =0.4             0.0-1.0        

 

 Imm Granulocytes/leuk NFr Bld Auto            =0.4             0.0-0.0        

 

 Neutrophils # Bld Auto            =4.1             2.5-7.5        

 

 Lymphocytes # Bld Auto            =1.8             1.0-4.0        

 

 Monocytes # Bld Auto            =0.6             0.2-0.8        

 

 Eosinophil # Bld Auto            =0.2             0.0-0.4        

 

 Basophils # Bld Auto            =0.0             0.0-0.1        

 

 Imm Granulocytes # Bld Auto            =0.0             0.0-0.0        









 Basic Metabolic Panel - 18 05:53         









 OSMO CALCULATED            =272.8             270.0-290.0        

 

 Sodium SerPl-sCnc            =134             135-145        

 

 Potassium SerPl-sCnc            =4.7             3.6-5.0        

 

 Chloride SerPl-sCnc            =94             101-111        

 

 CO2 SerPl-sCnc            =29             21-31        

 

 Anion Gap4 SerPl-sCnc            =16             8-18        

 

 BUN SerPl-mCnc            =23             6-20        

 

 Creat SerPl-mCnc            =4.38             0.50-1.20        

 

 Creatinine Clr Calc Pharmacy            =12.9465             NRG        

 

 GFR/BSA.pred SerPl CKD-EPI-ArVRat            =10             >=60        

 

 BUN/Creat SerPl            =5.0             10.0-20.0        

 

 Glucose SerPl-mCnc            =115                     

 

 Calcium SerPl-mCnc            =8.6             8.5-10.5        









 UA with screen for culture - 18 16:00         









 URINE CULTURE            To Follow             NRG        

 

 URINE MICROSCOPIC REQUIRED            Yes             NRG        

 

 Color Ur Auto            LIGHT YELLOW             Yellow        

 

 Clarity Ur Refract.auto            Clear             Clear        

 

 Glucose Ur Strip.auto-mCnc            =100             Negative        

 

 Bilirub Ur Strip.auto-mCnc            Negative             Negative        

 

 Ketones Ur Strip.auto-mCnc            Negative             Negative        

 

 Sp Gr Ur Refract.auto            =1.013             1.001-1.035        

 

 Hgb Ur Strip.auto-mCnc            =0.03             Negative        

 

 pH Ur Strip.auto            =7.5             5.0-9.0        

 

 Prot Ur Strip.auto-mCnc            =300             <20        

 

 Urobilinogen Ur Strip.auto-mCnc            0.2             0.2-1.0        

 

 Nitrite Ur Ql Strip            Negative             Negative        

 

 Leukocyte esterase Ur-aCnc            =250             Negative        

 

 RBC # Ur Auto            0             0-3        

 

 WBC # Ur Auto            21             0-3        

 

 Squamous # Ur Auto            0             0-3        

 

 Hyaline Casts # Ur Auto            0             0-3        

 

 Bacteria # Ur Auto            1+             Negative        









 Bacteria Ur Cult - 18 16:00         









 Bacteria Ur Cult            82153-1             NRG        

 

 [place] Count            >100,000             NRG        









 Complete Blood Count with Diff - 18 06:00         









 DIFF TYPE            Automated             NRG        

 

 WBC # Bld Auto            =6.1             4.0-11.0        

 

 RBC # Bld Auto            =2.71             4.00-5.20        

 

 Hgb Bld-mCnc            =8.5             12.0-16.0        

 

 Hct VFr Bld Auto            =26.0             36.0-46.0        

 

 MCV RBC Auto            =95.9             80.0-100.0        

 

 MCH RBC Qn Auto            =31.4             26.0-34.0        

 

 MCHC RBC Auto-mCnc            =32.7             31.0-37.0        

 

 RDW RBC Auto-Rto            =13.2             11.0-15.0        

 

 Platelet # Bld Auto            =207             140-450        

 

 PMV Bld Auto            =10.9             8.2-12.4        

 

 nRBC/100 WBC Bld Auto-Rto            =0.0             0.0-0.0        

 

 nRBC # Bld Auto            =0.0             0.0-0.0        

 

 Neutrophils/leuk NFr Bld Auto            =53.9             40.0-70.0        

 

 Lymphocytes/leuk NFr Bld Auto            =31.5             15.0-45.0        

 

 Monocytes/leuk NFr Bld Auto            =9.6             2.0-10.0        

 

 Eosinophil/leuk NFr Bld Auto            =4.2             0.0-6.0        

 

 Basophils/leuk NFr Bld Auto            =0.5             0.0-1.0        

 

 Imm Granulocytes/leuk NFr Bld Auto            =0.3             0.0-0.0        

 

 Neutrophils # Bld Auto            =3.3             2.5-7.5        

 

 Lymphocytes # Bld Auto            =1.9             1.0-4.0        

 

 Monocytes # Bld Auto            =0.6             0.2-0.8        

 

 Eosinophil # Bld Auto            =0.3             0.0-0.4        

 

 Basophils # Bld Auto            =0.0             0.0-0.1        

 

 Imm Granulocytes # Bld Auto            =0.0             0.0-0.0        









 Basic Metabolic Panel - 18 06:00         









 OSMO CALCULATED            =273.7             270.0-290.0        

 

 Sodium SerPl-sCnc            =134             135-145        

 

 Potassium SerPl-sCnc            =4.6             3.6-5.0        

 

 Chloride SerPl-sCnc            =94             101-111        

 

 CO2 SerPl-sCnc            =28             21-31        

 

 Anion Gap4 SerPl-sCnc            =17             8-18        

 

 BUN SerPl-mCnc            =34             6-20        

 

 Creat SerPl-mCnc            =5.51             0.50-1.20        

 

 Creatinine Clr Calc Pharmacy            =10.2914             NRG        

 

 GFR/BSA.pred SerPl CKD-EPI-ArVRat            =8             >=60        

 

 BUN/Creat SerPl            =6.0             10.0-20.0        

 

 Glucose SerPl-mCnc            =59                     

 

 Calcium SerPl-mCnc            =8.6             8.5-10.5        









 Complete Blood Count with Diff - 18 05:31         









 DIFF TYPE            Automated             NRG        

 

 WBC # Bld Auto            =8.6             4.0-11.0        

 

 RBC # Bld Auto            =2.87             4.00-5.20        

 

 Hgb Bld-mCnc            =9.0             12.0-16.0        

 

 Hct VFr Bld Auto            =27.0             36.0-46.0        

 

 MCV RBC Auto            =94.1             80.0-100.0        

 

 MCH RBC Qn Auto            =31.4             26.0-34.0        

 

 MCHC RBC Auto-mCnc            =33.3             31.0-37.0        

 

 RDW RBC Auto-Rto            =13.1             11.0-15.0        

 

 Platelet # Bld Auto            =229             140-450        

 

 PMV Bld Auto            =10.9             8.2-12.4        

 

 nRBC/100 WBC Bld Auto-Rto            =0.0             0.0-0.0        

 

 nRBC # Bld Auto            =0.0             0.0-0.0        

 

 Neutrophils/leuk NFr Bld Auto            =69.9             40.0-70.0        

 

 Lymphocytes/leuk NFr Bld Auto            =15.1             15.0-45.0        

 

 Monocytes/leuk NFr Bld Auto            =12.5             2.0-10.0        

 

 Eosinophil/leuk NFr Bld Auto            =1.7             0.0-6.0        

 

 Basophils/leuk NFr Bld Auto            =0.3             0.0-1.0        

 

 Imm Granulocytes/leuk NFr Bld Auto            =0.5             0.0-0.0        

 

 Neutrophils # Bld Auto            =6.0             2.5-7.5        

 

 Lymphocytes # Bld Auto            =1.3             1.0-4.0        

 

 Monocytes # Bld Auto            =1.1             0.2-0.8        

 

 Eosinophil # Bld Auto            =0.2             0.0-0.4        

 

 Basophils # Bld Auto            =0.0             0.0-0.1        

 

 Imm Granulocytes # Bld Auto            =0.0             0.0-0.0        









 Basic Metabolic Panel - 18 05:31         









 OSMO CALCULATED            =262.4             270.0-290.0        

 

 Sodium SerPl-sCnc            =130             135-145        

 

 Potassium SerPl-sCnc            =4.3             3.6-5.0        

 

 Chloride SerPl-sCnc            =88             101-111        

 

 CO2 SerPl-sCnc            =31             21-31        

 

 Anion Gap4 SerPl-sCnc            =15             8-18        

 

 BUN SerPl-mCnc            =22             6-20        

 

 Creat SerPl-mCnc            =4.52             0.50-1.20        

 

 Creatinine Clr Calc Pharmacy            =12.5455             NRG        

 

 GFR/BSA.pred SerPl CKD-EPI-ArVRat            =10             >=60        

 

 BUN/Creat SerPl            =5.0             10.0-20.0        

 

 Glucose SerPl-mCnc            =67                     

 

 Calcium SerPl-mCnc            =8.5             8.5-10.5        









 Complete Blood Count with Diff - 18 06:41         









 DIFF TYPE            Automated             NRG        

 

 WBC # Bld Auto            =6.3             4.0-11.0        

 

 RBC # Bld Auto            =2.90             4.00-5.20        

 

 Hgb Bld-mCnc            =9.3             12.0-16.0        

 

 Hct VFr Bld Auto            =28.0             36.0-46.0        

 

 MCV RBC Auto            =96.6             80.0-100.0        

 

 MCH RBC Qn Auto            =32.1             26.0-34.0        

 

 MCHC RBC Auto-mCnc            =33.2             31.0-37.0        

 

 RDW RBC Auto-Rto            =13.5             11.0-15.0        

 

 Platelet # Bld Auto            =273             140-450        

 

 PMV Bld Auto            =10.8             8.2-12.4        

 

 nRBC/100 WBC Bld Auto-Rto            =0.0             0.0-0.0        

 

 nRBC # Bld Auto            =0.0             0.0-0.0        

 

 Neutrophils/leuk NFr Bld Auto            =55.0             40.0-70.0        

 

 Lymphocytes/leuk NFr Bld Auto            =29.6             15.0-45.0        

 

 Monocytes/leuk NFr Bld Auto            =10.5             2.0-10.0        

 

 Eosinophil/leuk NFr Bld Auto            =3.8             0.0-6.0        

 

 Basophils/leuk NFr Bld Auto            =0.5             0.0-1.0        

 

 Imm Granulocytes/leuk NFr Bld Auto            =0.6             0.0-0.0        

 

 Neutrophils # Bld Auto            =3.5             2.5-7.5        

 

 Lymphocytes # Bld Auto            =1.9             1.0-4.0        

 

 Monocytes # Bld Auto            =0.7             0.2-0.8        

 

 Eosinophil # Bld Auto            =0.2             0.0-0.4        

 

 Basophils # Bld Auto            =0.0             0.0-0.1        

 

 Imm Granulocytes # Bld Auto            =0.0             0.0-0.0        









 Basic Metabolic Panel - 18 06:41         









 OSMO CALCULATED            =264.7             270.0-290.0        

 

 Sodium SerPl-sCnc            =132             135-145        

 

 Potassium SerPl-sCnc            =4.0             3.6-5.0        

 

 Chloride SerPl-sCnc            =89             101-111        

 

 CO2 SerPl-sCnc            =31             21-31        

 

 Anion Gap4 SerPl-sCnc            =16             8-18        

 

 BUN SerPl-mCnc            =20             6-20        

 

 Creat SerPl-mCnc            =4.69             0.50-1.20        

 

 Creatinine Clr Calc Pharmacy            =12.0908             NRG        

 

 GFR/BSA.pred SerPl CKD-EPI-ArVRat            =10             >=60        

 

 BUN/Creat SerPl            =4.0             10.0-20.0        

 

 Glucose SerPl-mCnc            =54                     

 

 Calcium SerPl-mCnc            =8.2             8.5-10.5        









 UA with screen for culture - 18 18:20         









 URINE CULTURE            To Follow             NRG        

 

 URINE MICROSCOPIC REQUIRED            Yes             NRG        

 

 Color Ur Auto            LIGHT YELLOW             Yellow        

 

 Clarity Ur Refract.auto            Clear             Clear        

 

 Glucose Ur Strip.auto-mCnc            =50             Negative        

 

 Bilirub Ur Strip.auto-mCnc            Negative             Negative        

 

 Ketones Ur Strip.auto-mCnc            Negative             Negative        

 

 Sp Gr Ur Refract.auto            =1.006             1.001-1.035        

 

 Hgb Ur Strip.auto-mCnc            =0.03             Negative        

 

 pH Ur Strip.auto            =8.0             5.0-9.0        

 

 Prot Ur Strip.auto-mCnc            =300             <20        

 

 Urobilinogen Ur Strip.auto-mCnc            0.2             0.2-1.0        

 

 Nitrite Ur Ql Strip            Negative             Negative        

 

 Leukocyte esterase Ur-aCnc            =75             Negative        

 

 RBC # Ur Auto            8             0-3        

 

 WBC # Ur Auto            4             0-3        

 

 Squamous # Ur Auto            8             0-3        

 

 Hyaline Casts # Ur Auto            0             0-3        

 

 Bacteria # Ur Auto            1+             Negative        









 Bacteria Ur Cult - 18 18:20         









 Bacteria Ur Cult            [id number]             NRG        

 

 [place] Count            <10,000             NRG        









 Complete Blood Count with Diff - 18 05:30         









 DIFF TYPE            Automated             NRG        

 

 WBC # Bld Auto            =5.6             4.0-11.0        

 

 RBC # Bld Auto            =2.72             4.00-5.20        

 

 Hgb Bld-mCnc            =8.7             12.0-16.0        

 

 Hct VFr Bld Auto            =25.8             36.0-46.0        

 

 MCV RBC Auto            =94.9             80.0-100.0        

 

 MCH RBC Qn Auto            =32.0             26.0-34.0        

 

 MCHC RBC Auto-mCnc            =33.7             31.0-37.0        

 

 RDW RBC Auto-Rto            =13.5             11.0-15.0        

 

 Platelet # Bld Auto            =273             140-450        

 

 PMV Bld Auto            =10.0             8.2-12.4        

 

 nRBC/100 WBC Bld Auto-Rto            =0.0             0.0-0.0        

 

 nRBC # Bld Auto            =0.0             0.0-0.0        

 

 Neutrophils/leuk NFr Bld Auto            =52.8             40.0-70.0        

 

 Lymphocytes/leuk NFr Bld Auto            =30.2             15.0-45.0        

 

 Monocytes/leuk NFr Bld Auto            =11.1             2.0-10.0        

 

 Eosinophil/leuk NFr Bld Auto            =5.0             0.0-6.0        

 

 Basophils/leuk NFr Bld Auto            =0.4             0.0-1.0        

 

 Imm Granulocytes/leuk NFr Bld Auto            =0.5             0.0-0.0        

 

 Neutrophils # Bld Auto            =2.9             2.5-7.5        

 

 Lymphocytes # Bld Auto            =1.7             1.0-4.0        

 

 Monocytes # Bld Auto            =0.6             0.2-0.8        

 

 Eosinophil # Bld Auto            =0.3             0.0-0.4        

 

 Basophils # Bld Auto            =0.0             0.0-0.1        

 

 Imm Granulocytes # Bld Auto            =0.0             0.0-0.0        









 Magnesium SerPl-mCnc - 18 05:30         









 Magnesium SerPl-mCnc            =2.7             1.8-2.5        









 Renal Function Panel - 18 05:30         









 Sodium SerPl-sCnc            =132             135-145        

 

 Potassium SerPl-sCnc            =4.0             3.6-5.0        

 

 Chloride SerPl-sCnc            =91             101-111        

 

 CO2 SerPl-sCnc            =31             21-31        

 

 BUN SerPl-mCnc            =28             6-20        

 

 Creat SerPl-mCnc            =6.10             0.50-1.20        

 

 Creatinine Clr Calc Pharmacy            =9.2960             NRG        

 

 GFR/BSA.pred SerPl CKD-EPI-ArVRat            =7             >=60        

 

 Glucose SerPl-mCnc            =131                     

 

 Calcium SerPl-mCnc            =8.3             8.5-10.5        

 

 Albumin SerPl-mCnc            =3.2             3.5-5.5        

 

 Deprecated Phosphate SerPl-mCnc            =4.8             2.5-4.6        









 Complete Blood Count with Diff - 18 05:35         









 DIFF TYPE            Automated             NRG        

 

 WBC # Bld Auto            =5.6             4.0-11.0        

 

 RBC # Bld Auto            =2.76             4.00-5.20        

 

 Hgb Bld-mCnc            =8.8             12.0-16.0        

 

 Hct VFr Bld Auto            =26.5             36.0-46.0        

 

 MCV RBC Auto            =96.0             80.0-100.0        

 

 MCH RBC Qn Auto            =31.9             26.0-34.0        

 

 MCHC RBC Auto-mCnc            =33.2             31.0-37.0        

 

 RDW RBC Auto-Rto            =13.8             11.0-15.0        

 

 Platelet # Bld Auto            =270             140-450        

 

 PMV Bld Auto            =10.1             8.2-12.4        

 

 nRBC/100 WBC Bld Auto-Rto            =0.0             0.0-0.0        

 

 nRBC # Bld Auto            =0.0             0.0-0.0        

 

 Neutrophils/leuk NFr Bld Auto            =50.4             40.0-70.0        

 

 Lymphocytes/leuk NFr Bld Auto            =32.8             15.0-45.0        

 

 Monocytes/leuk NFr Bld Auto            =11.7             2.0-10.0        

 

 Eosinophil/leuk NFr Bld Auto            =3.9             0.0-6.0        

 

 Basophils/leuk NFr Bld Auto            =0.5             0.0-1.0        

 

 Imm Granulocytes/leuk NFr Bld Auto            =0.7             0.0-0.0        

 

 Neutrophils # Bld Auto            =2.8             2.5-7.5        

 

 Lymphocytes # Bld Auto            =1.9             1.0-4.0        

 

 Monocytes # Bld Auto            =0.7             0.2-0.8        

 

 Eosinophil # Bld Auto            =0.2             0.0-0.4        

 

 Basophils # Bld Auto            =0.0             0.0-0.1        

 

 Imm Granulocytes # Bld Auto            =0.0             0.0-0.0        









 Renal Function Panel - 18 05:35         









 Sodium SerPl-sCnc            =134             135-145        

 

 Potassium SerPl-sCnc            =3.8             3.6-5.0        

 

 Chloride SerPl-sCnc            =93             101-111        

 

 CO2 SerPl-sCnc            =30             21-31        

 

 BUN SerPl-mCnc            =15             6-20        

 

 Creat SerPl-mCnc            =4.12             0.50-1.20        

 

 Creatinine Clr Calc Pharmacy            =13.7635             NRG        

 

 GFR/BSA.pred SerPl CKD-EPI-ArVRat            =11             >=60        

 

 Glucose SerPl-mCnc            =140                     

 

 Calcium SerPl-mCnc            =8.2             8.5-10.5        

 

 Albumin SerPl-mCnc            =3.0             3.5-5.5        

 

 Deprecated Phosphate SerPl-mCnc            =3.4             2.5-4.6        









 Complete Blood Count with Diff - 18 06:37         









 DIFF TYPE            Automated             NRG        

 

 WBC # Bld Auto            =5.7             4.0-11.0        

 

 RBC # Bld Auto            =2.73             4.00-5.20        

 

 Hgb Bld-mCnc            =8.9             12.0-16.0        

 

 Hct VFr Bld Auto            =26.8             36.0-46.0        

 

 MCV RBC Auto            =98.2             80.0-100.0        

 

 MCH RBC Qn Auto            =32.6             26.0-34.0        

 

 MCHC RBC Auto-mCnc            =33.2             31.0-37.0        

 

 RDW RBC Auto-Rto            =14.1             11.0-15.0        

 

 Platelet # Bld Auto            =247             140-450        

 

 PMV Bld Auto            =10.0             8.2-12.4        

 

 nRBC/100 WBC Bld Auto-Rto            =0.0             0.0-0.0        

 

 nRBC # Bld Auto            =0.0             0.0-0.0        

 

 Neutrophils/leuk NFr Bld Auto            =52.5             40.0-70.0        

 

 Lymphocytes/leuk NFr Bld Auto            =31.3             15.0-45.0        

 

 Monocytes/leuk NFr Bld Auto            =11.2             2.0-10.0        

 

 Eosinophil/leuk NFr Bld Auto            =3.9             0.0-6.0        

 

 Basophils/leuk NFr Bld Auto            =0.4             0.0-1.0        

 

 Imm Granulocytes/leuk NFr Bld Auto            =0.7             0.0-0.0        

 

 Neutrophils # Bld Auto            =3.0             2.5-7.5        

 

 Lymphocytes # Bld Auto            =1.8             1.0-4.0        

 

 Monocytes # Bld Auto            =0.6             0.2-0.8        

 

 Eosinophil # Bld Auto            =0.2             0.0-0.4        

 

 Basophils # Bld Auto            =0.0             0.0-0.1        

 

 Imm Granulocytes # Bld Auto            =0.0             0.0-0.0        









 Renal Function Panel - 18 06:37         









 Sodium SerPl-sCnc            =135             135-145        

 

 Potassium SerPl-sCnc            =4.2             3.6-5.0        

 

 Chloride SerPl-sCnc            =96             101-111        

 

 CO2 SerPl-sCnc            =30             21-31        

 

 BUN SerPl-mCnc            =24             6-20        

 

 Creat SerPl-mCnc            =5.53             0.50-1.20        

 

 Creatinine Clr Calc Pharmacy            =10.2542             NRG        

 

 GFR/BSA.pred SerPl CKD-EPI-ArVRat            =8             >=60        

 

 Glucose SerPl-mCnc            =131                     

 

 Calcium SerPl-mCnc            =8.3             8.5-10.5        

 

 Albumin SerPl-mCnc            =3.1             3.5-5.5        

 

 Deprecated Phosphate SerPl-mCnc            =3.9             2.5-4.6        



                                                                               
               



Encounters

      





 ACCT No.            Visit Date/Time            Discharge            Status    
        Pt. Type            Provider            Facility            Loc./Unit  
          Complaint        

 

 [id number]            2019 11:32:00            2019 13:38:00    
        DIS            Emergency            GAGAN BATRES, [facility]            01.ED                     

 

 [id number]            2018 15:35:00            12/10/2018 14:21:00    
        DIS            Inpatient            Ronni OBRIEN, [facility]            01.REHAB                     

 

 [id number]            2018 10:00:00            2018 23:59:59    
        CLS            Preadmit            Gina OBRIEN, [facility]            01.AMBS                     

 

 [id number]            2018 17:03:00            2018 15:22:00    
        DIS            Inpatient            Sarah OBRIEN, [facility]            01.4MED E                     

 

 [id number]            2018 20:30:00            2018 22:35:00    
        DIS            Emergency            ANGELO OBRIEN, [facility]            01.ED                     

 

 [id number]            2018 16:48:00                                   
   Document Registration                                                       
     

 

 [id number]            2018 15:35:00                                   
   Document Registration                                                       
     

 

 [id number]            2018 15:35:00                                   
   Document Registration                                                       
     

 

 [id number]            2018 15:35:00                                   
   Document Registration                                                       
     

 

 [id number]            2018 15:35:00                                   
   Document Registration                                                       
     

 

 [id number]            2018 15:35:00                                   
   Document Registration                                                       
     

 

 [id number]            2018 15:35:00                                   
   Document Registration                                                       
     

 

 [id number]            2018 15:35:00                                   
   Document Registration                                                       
     

 

 [id number]            2018 13:51:00                                   
   Document Registration                                                       
     

 

 [id number]            2018 14:31:00                                   
   Document Registration                                                       
     

 

 [id number]            2018 18:25:00                                   
   Document Registration                                                       
     

 

 [id number]            2018 17:03:00                                   
   Document Registration                                                       
     

 

 [id number]            2018 17:03:00                                   
   Document Registration                                                       
     

 

 [id number]            2018 17:03:00                                   
   Document Registration                                                       
     

 

 [id number]            2018 17:03:00                                   
   Document Registration                                                       
     

 

 [id number]            2018 17:03:00                                   
   Document Registration                                                       
     

 

 [id number]            2016 15:24:00            2016 23:59:59    
        [facility]            [facility]                     

 

 [id number]            2016 02:59:00            2016 23:59:59    
        CLS            [facility]            ED                     

 

 [id number]            2018 20:30:00            2018 23:59:59    
        CLS            [facility]            01.ED                     

 

 [id number]            2017 18:52:00            2017 23:59:59    
        CLS            [facility]            ED                     

 

 [id number]            2016 00:42:17            2016 00:42:17        
    DIS            Outpatient                                                  
          

 

 [id number]            2016 11:52:52            2016 11:52:52  
          DIS            Outpatient                                            
                

 

 [id number]            05/10/2016 17:00:00            05/10/2016 17:00:00  
          DIS            Outpatient                                            
                

 

 [id number]            2016 14:19:59            2016 14:19:59  
          DIS            Outpatient                                            
                

 

 [id number]            2016 14:15:45            2016 14:15:45  
          DIS            Outpatient                                            
                

 

 [id number]            2016 15:11:34            2016 15:11:34  
          DIS            Outpatient                                            
                

 

 [id number]            2016 15:09:54            2016 15:09:54  
          DIS            Outpatient                                            
                

 

 [id number]            2016 15:09:34            2016 15:09:34  
          DIS            Outpatient                                            
                

 

 [id number]            2016 13:54:00            2016 13:54:00  
          DIS            Outpatient                                            
                

 

 [id number]            2016 13:42:27            2016 13:42:27  
          DIS            Outpatient                                            
                

 

 [id number]            2016 13:31:29            2016 13:31:29  
          DIS            Outpatient                                            
                

 

 [id number]            2016 09:31:26            2016 09:31:26  
          DIS            Outpatient                                            
                

 

 [id number]            2016 14:17:34            2016 14:17:34  
          DIS            Outpatient                                            
                

 

 [id number]            2016 09:54:59            2016 09:54:59  
          DIS            Outpatient                                            
                

 

 [id number]            2016 14:44:43            2016 14:44:43  
          DIS            Outpatient                                            
                

 

 [id number]            2016 13:11:33            2016 13:11:33  
          DIS            Outpatient                                            
                

 

 [id number]            2016 08:16:46            2016 08:16:46  
          DIS            Outpatient                                            
                

 

 [id number]            2016 15:46:14            2016 15:46:14  
          DIS            Outpatient                                            
                

 

 [id number]            2016 13:22:25            2016 13:22:25  
          DIS            Outpatient                                            
                

 

 [id number]            2016 12:40:02            2016 12:40:02  
          DIS            Outpatient                                            
                

 

 [id number]            2016 12:39:58            2016 12:39:58  
          DIS            Outpatient                                            
                

 

 [id number]            2016 11:25:43            2016 11:25:43  
          DIS            Outpatient                                            
                

 

 [id number]            2016 11:24:32            2016 11:24:32  
          DIS            Outpatient                                            
                

 

 [id number]            2016 11:23:05            2016 11:23:05  
          DIS            Outpatient                                            
                

 

 [id number]            2016 08:35:28            2016 08:35:28  
          DIS            Outpatient                                            
                

 

 [id number]            2016 14:17:18            2016 14:17:18  
          DIS            Outpatient                                            
                

 

 [id number]            2016 14:16:58            2016 14:16:58  
          DIS            Outpatient                                            
                

 

 [id number]            2016 14:35:52            2016 14:35:52  
          DIS            Outpatient                                            
                

 

 [id number]            2016 14:32:23            2016 14:32:23  
          DIS            Outpatient                                            
                

 

 [id number]            2016 08:37:55            2016 08:37:55  
          DIS            Outpatient                                            
                

 

 [id number]            2016 15:59:34            2016 15:59:34  
          DIS            Outpatient                                            
                

 

 [id number]            2016 09:39:03            2016 09:39:03  
          DIS            Outpatient                                            
                

 

 [id number]            2016 10:56:04            2016 10:56:04  
          DIS            Outpatient                                            
                

 

 [id number]            2015 14:38:48            2015 14:38:48  
                       Outpatient                                              
              

 

 [id number]            2015 14:21:17            2015 14:21:17  
                       Outpatient                                              
              

 

 [id number]            2015 14:15:38            2015 14:15:38  
                       Outpatient                                              
              

 

 [id number]            2015 14:03:57            2015 14:03:57  
                       Outpatient                                              
              

 

 [id number]            2015 14:01:00            2015 14:01:01  
                       Outpatient                                              
              

 

 [id number]            2015 13:54:39            2015 13:54:39  
                       Outpatient                                              
              

 

 [id number]            2015 13:53:39            2015 13:53:39  
                       Outpatient                                              
              

 

 [id number]            2015 13:41:57            2015 13:41:57  
                       Outpatient                                              
              

 

 [id number]            2015 13:38:37            2015 13:38:37  
                       Outpatient                                              
              

 

 [id number]            2015 13:34:39            2015 13:34:39  
                       Outpatient                                              
              

 

 [id number]            2015 13:34:34            2015 13:34:34  
                       Outpatient                                              
              

 

 [id number]            2015 13:34:16            2015 13:34:16  
                       Outpatient                                              
              

 

 [id number]            2015 13:34:14            2015 13:34:14  
                       Outpatient                                              
              

 

 [id number]            2015 13:33:43            2015 13:33:43  
                       Outpatient                                              
              

 

 [id number]            2015 13:33:39            2015 13:33:39  
                       Outpatient                                              
              

 

 [id number]            2015 13:33:07            2015 13:33:07  
                       Outpatient                                              
              

 

 [id number]            2015 13:03:28            2015 13:03:28  
                       Outpatient                                              
              

 

 [id number]            2015 12:12:49            2015 12:12:49  
                       Outpatient                                              
              

 

 [id number]            2015 12:12:46            2015 12:12:46  
                       Outpatient                                              
              

 

 [id number]            2015 12:08:33            2015 12:08:33  
                       Outpatient                                              
              

 

 [id number]            2015 11:55:03            2015 11:55:03  
                       Outpatient                                              
              

 

 [id number]            2015 11:55:01            2015 11:55:01  
                       Outpatient                                              
              

 

 [id number]            2015 11:55:00            2015 11:55:00  
                       Outpatient                                              
              

 

 [id number]            2015 11:54:59            2015 11:54:59  
                       Outpatient                                              
              

 

 [id number]            2015 11:50:30            2015 11:50:30  
                       Outpatient                                              
              

 

 [id number]            2015 11:33:51            2015 11:33:51  
                       Outpatient                                              
              

 

 [id number]            2015 11:33:00            2015 11:33:00  
                       Outpatient                                              
              

 

 [id number]            2015 11:03:57            2015 11:03:57  
                       Outpatient                                              
              

 

 [id number]            2015 10:52:07            2015 10:52:07  
                       Outpatient                                              
              

 

 [id number]            2015 10:31:17            2015 10:31:17  
                       Outpatient                                              
              

 

 [id number]            2015 10:29:01            2015 10:29:01  
                       Outpatient                                              
              

 

 [id number]            2015 10:28:44            2015 10:28:44  
                       Outpatient                                              
              

 

 [id number]            2015 10:28:13            2015 10:28:13  
                       Outpatient                                              
              

 

 [id number]            2015 10:25:15            2015 10:25:15  
                       Outpatient                                              
              

 

 [id number]            2015 10:25:12            2015 10:25:12  
                       Outpatient                                              
              

 

 [id number]            2015 10:21:54            2015 10:21:54  
                       Outpatient                                              
              

 

 [id number]            2015 10:21:35            2015 10:21:35  
                       Outpatient                                              
              

 

 [id number]            2015 10:21:34            2015 10:21:34  
                       Outpatient                                              
              

 

 [id number]            2015 10:09:10            2015 10:09:10  
                       Outpatient                                              
              

 

 [id number]            2015 14:49:42            2015 14:49:42  
          DIS            Outpatient                                            
                

 

 [id number]            2015 10:38:45            2015 10:38:45  
          DIS            Outpatient                                            
                

 

 [id number]            2015 18:00:07            2015 18:00:07  
          DIS            Outpatient                                            
                

 

 [id number]            2015 14:15:17            2015 14:15:17  
          DIS            Outpatient                                            
                

 

 [id number]            2015 14:14:00            2015 14:14:00  
          DIS            Outpatient                                            
                

 

 [id number]            2015 14:05:29            2015 14:05:29  
          DIS            Outpatient                                            
                

 

 [id number]            2015 14:04:22            2015 14:04:22  
          DIS            Outpatient                                            
                

 

 [id number]            2015 14:03:44            2015 14:03:44  
          DIS            Outpatient                                            
                

 

 [id number]            2015 13:00:51            2015 13:00:51  
          DIS            Outpatient                                            
                

 

 [id number]            2015 12:43:09            2015 12:43:09  
          DIS            Outpatient                                            
                

 

 [id number]            2015 12:43:06            2015 12:43:06  
          DIS            Outpatient                                            
                

 

 [id number]            09/15/2015 15:01:55            09/15/2015 15:01:57  
          DIS            Outpatient                                            
                

 

 [id number]            2015 11:00:40            2015 11:00:40  
                       Outpatient                                              
              

 

 [id number]            2015 19:26:42            2015 19:26:42  
          DIS            Outpatient                                            
                

 

 [id number]            2015 15:27:28            2015 15:27:28  
          DIS            Outpatient                                            
                

 

 [id number]            2015 13:07:00            2015 13:07:00  
          DIS            Outpatient                                            
                

 

 [id number]            07/15/2015 13:52:12            07/15/2015 13:52:12  
          DIS            Outpatient                                            
                

 

 [id number]            2015 15:43:31            2015 15:43:31  
          DIS            Outpatient                                            
                

 

 [id number]            2015 08:54:42            2015 08:54:42  
          DIS            Outpatient                                            
                

 

 [id number]            2015 15:38:50            2015 15:38:50  
          DIS            Outpatient                                            
                

 

 [id number]            2015 10:21:22            2015 10:21:22  
          DIS            Outpatient                                            
                

 

 [id number]            2015 10:21:17            2015 10:21:17  
          DIS            Outpatient                                            
                

 

 [id number]            2015 15:10:37            2015 15:10:37  
          DIS            Outpatient                                            
                

 

 [id number]            2015 15:53:05            2015 15:53:05  
          DIS            Outpatient                                            
                

 

 [id number]            2015 15:53:01            2015 15:53:01  
          DIS            Outpatient                                            
                

 

 [id number]            2015 15:52:57            2015 15:52:57  
          DIS            Outpatient                                            
                

 

 [id number]            2015 15:52:54            2015 15:52:54  
          DIS            Outpatient                                            
                

 

 [id number]            2015 15:50:24            2015 15:50:24  
          DIS            Outpatient                                            
                

 

 [id number]            2015 15:00:00            2015 15:00:00  
          DIS            Outpatient                                            
                

 

 [id number]            2015 14:52:42            2015 14:52:42  
          DIS            Outpatient                                            
                

 

 [id number]            2015 14:50:03            2015 14:50:03  
          DIS            Outpatient                                            
                

 

 [id number]            2015 13:09:38            2015 13:09:38  
          DIS            Outpatient                                            
                

 

 [id number]            2015 18:21:26            2015 18:21:26  
          DIS            Outpatient                                            
                

 

 [id number]            2015 18:21:22            2015 18:21:22  
          DIS            Outpatient                                            
                

 

 [id number]            2015 17:38:22            2015 17:38:22  
          DIS            Outpatient                                            
                

 

 [id number]            2015 17:34:10            2015 17:34:10  
          DIS            Outpatient                                            
                

 

 [id number]            2015 17:32:00            2015 17:32:00  
          DIS            Outpatient                                            
                

 

 [id number]            2015 17:31:56            2015 17:31:56  
          DIS            Outpatient                                            
                

 

 [id number]            2015 17:31:53            2015 17:31:53  
          DIS            Outpatient                                            
                

 

 [id number]            2015 15:50:40            2015 15:50:40  
          DIS            Outpatient                                            
                

 

 [id number]            2015 10:36:04            2015 10:36:04  
          DIS            Outpatient                                            
                

 

 [id number]            2015 12:28:41            2015 12:28:41  
          DIS            Outpatient                                            
                

 

 [id number]            2015 16:27:52            2015 16:27:52  
          DIS            Outpatient                                            
                

 

 [id number]            2015 18:39:37            2015 18:39:37  
          DIS            Outpatient                                            
                

 

 [id number]            2015 13:01:30            2015 13:01:30  
          DIS            Outpatient                                            
                

 

 [id number]            2015 12:46:58            2015 12:46:58  
          DIS            Outpatient                                            
                

 

 [id number]            2015 12:41:49            2015 12:41:49  
          DIS            Outpatient                                            
                

 

 [id number]            2015 08:36:53            2015 08:36:53  
          DIS            Outpatient                                            
                

 

 [id number]            2015 09:05:29            2015 09:05:29  
          DIS            Outpatient                                            
                

 

 [id number]            2015 09:05:26            2015 09:05:26  
          DIS            Outpatient                                            
                

 

 [id number]            2015 15:29:43            2015 15:29:43  
          [facility]            Outpatient                                            
                

 

 [id number]            04/10/2015 10:38:05            04/10/2015 10:38:05  
          [facility]            Outpatient                                            
                

 

 [id number]            2015 18:35:05            2015 18:35:05  
          [facility]            Outpatient                                            
                

 

 [id number]            2015 16:35:18            2015 16:35:19  
          DIS            Outpatient                                            
                

 

 [id number]            2015 10:33:09            2015 10:33:09  
          DIS            Outpatient                                            
                

 

 [id number]            2015 10:33:08            2015 10:33:09  
          DIS            Outpatient                                            
                

 

 [id number]            2015 15:11:14            2015 15:11:14  
          DIS            Outpatient                                            
                

 

 [id number]            2015 15:11:11            2015 15:11:12  
          DIS            Outpatient                                            
                

 

 [id number]            2015 12:57:29            2015 12:57:29  
          DIS            Outpatient                                            
                

 

 [id number]            2015 12:48:24            2015 12:48:24  
          DIS            Outpatient                                            
                

 

 [id number]            2015 12:44:48            2015 12:44:48  
          DIS            Outpatient                                            
                

 

 [id number]            2015 12:41:02            2015 12:41:02  
          DIS            Outpatient                                            
                

 

 [id number]            2015 12:35:17            2015 12:35:17  
          DIS            Outpatient                                            
                

 

 [id number]            2015 12:35:16            2015 12:35:16  
          DIS            Outpatient                                            
                

 

 [id number]            2015 10:37:02            2015 10:37:02  
          DIS            Outpatient                                            
                

 

 [id number]            2015 10:40:56            2015 10:40:56  
          DIS            Outpatient                                            
                

 

 [id number]            2014 14:53:43            2014 14:53:43  
          DIS            Outpatient                                            
                

 

 [id number]            2014 12:07:00            2014 12:07:00  
          DIS            Outpatient                                            
                

 

 [id number]            2014 12:06:22            2014 12:06:22  
          DIS            Outpatient                                            
                

 

 [id number]            2014 15:33:23            2014 15:33:23  
          DIS            Outpatient                                            
                

 

 [id number]            2014 15:33:22            2014 15:33:22  
          DIS            Outpatient                                            
                

 

 [id number]            2014 15:33:21            2014 15:33:21  
          DIS            Outpatient                                            
                

 

 [id number]            2014 15:33:20            2014 15:33:20  
          DIS            Outpatient                                            
                

 

 [id number]            2014 08:57:22            2014 08:57:22  
          DIS            Outpatient                                            
                

 

 [id number]            2014 08:57:21            2014 08:57:21  
          DIS            Outpatient                                            
                

 

 [id number]            2014 08:57:07            2014 08:57:07  
          DIS            Outpatient                                            
                

 

 [id number]            2014 16:00:07            2014 16:00:07  
          DIS            Outpatient                                            
                

 

 [id number]            2014 16:00:04            2014 16:00:04  
          DIS            Outpatient                                            
                

 

 [id number]            2014 14:51:57            2014 14:51:57  
          DIS            Outpatient                                            
                

 

 [id number]            2014 14:51:16            2014 14:51:16  
          DIS            Outpatient                                            
                

 

 [id number]            2014 14:46:16            2014 14:46:16  
          DIS            Outpatient                                            
                

 

 [id number]            2014 14:34:11            2014 14:34:11  
          DIS            Outpatient                                            
                

 

 [id number]            2014 14:24:02            2014 14:24:02  
          DIS            Outpatient                                            
                

 

 [id number]            2014 14:23:12            2014 14:23:12  
          DIS            Outpatient                                            
                

 

 [id number]            2014 12:00:41            2014 12:00:41  
          DIS            Outpatient                                            
                

 

 [id number]            2014 12:00:40            2014 12:00:40  
          DIS            Outpatient                                            
                

 

 [id number]            2014 12:00:39            2014 12:00:39  
          DIS            Outpatient                                            
                

 

 [id number]            2016 17:04:12                                 
     Document Registration                                                     
       

 

 [id number]            2016 17:04:10                                 
     Document Registration                                                     
       

 

 [id number]            2016 17:04:08                                 
     Document Registration                                                     
       

 

 [id number]            2016 17:04:06                                 
     Document Registration                                                     
       

 

 [id number]            2016 17:04:03                                 
     Document Registration                                                     
       

 

 [id number]            2016 17:04:01                                 
     Document Registration                                                     
       

 

 [id number]            2016 17:03:59                                 
     Document Registration                                                     
       

 

 [id number]            2016 17:03:57                                 
     Document Registration                                                     
       

 

 [id number]            2016 17:03:55                                 
     Document Registration                                                     
       

 

 [id number]            2016 17:03:53                                 
     Document Registration                                                     
       

 

 [id number]            2016 17:03:50                                 
     Document Registration                                                     
       

 

 [id number]            2016 14:34:43                                 
     Document Registration                                                     
       

 

 [id number]            2016 08:29:02                                 
     Document Registration                                                     
       

 

 [id number]            2016 10:33:05                                 
     Document Registration                                                     
       

 

 [id number]            2016 10:26:56                                 
     Document Registration                                                     
       

 

 [id number]            2016 10:26:53                                 
     Document Registration                                                     
       

 

 [id number]            2016 14:35:10                                 
     Document Registration                                                     
       

 

 [id number]            2016 14:35:08                                 
     Document Registration                                                     
       

 

 [id number]            2016 14:34:56                                 
     Document Registration                                                     
       

 

 [id number]            2016 14:34:54                                 
     Document Registration                                                     
       

 

 [id number]            2016 14:34:51                                 
     Document Registration                                                     
       

 

 [id number]            2016 14:34:49                                 
     Document Registration                                                     
       

 

 [id number]            2016 16:39:08                                 
     Document Registration                                                     
       

 

 [id number]            2016 13:46:46                                 
     Document Registration                                                     
       

 

 [id number]            2016 13:46:44                                 
     Document Registration                                                     
       

 

 [id number]            2016 13:28:20                                 
     Document Registration                                                     
       

 

 [id number]            2016 15:04:46                                 
     Document Registration                                                     
       

 

 [id number]            2016 15:04:44                                 
     Document Registration                                                     
       

 

 [id number]            2016 11:15:03                                 
     Document Registration                                                     
       

 

 [id number]            2016 12:59:51                                 
     Document Registration                                                     
       

 

 [id number]            2016 12:59:49                                 
     Document Registration                                                     
       

 

 [id number]            2016 12:59:46                                 
     Document Registration                                                     
       

 

 [id number]            2016 12:59:44                                 
     Document Registration                                                     
       

 

 [id number]            2016 12:59:42                                 
     Document Registration                                                     
       

 

 [id number]            2016 12:59:40                                 
     Document Registration                                                     
       

 

 [id number]            2016 12:59:37                                 
     Document Registration                                                     
       

 

 [id number]            2016 12:59:35                                 
     Document Registration                                                     
       

 

 [id number]            2016 11:52:37                                 
     Document Registration                                                     
       

 

 [id number]            2016 11:52:35                                 
     Document Registration                                                     
       

 

 [id number]            2016 11:52:33                                 
     Document Registration                                                     
       

 

 [id number]            2016 11:52:31                                 
     Document Registration                                                     
       

 

 [id number]            2016 11:52:28                                 
     Document Registration                                                     
       

 

 [id number]            2016 11:52:26                                 
     Document Registration                                                     
       

 

 [id number]            2016 11:52:24                                 
     Document Registration                                                     
       

 

 [id number]            2016 12:48:49                                 
     Document Registration                                                     
       

 

 [id number]            2016 13:52:00                                   
   Document Registration                                                       
     

 

 [id number]            2016 11:15:00                                   
   Document Registration                                                       
     

 

 [id number]            2016 10:07:58                                 
     Document Registration                                                     
       

 

 [id number]            2016 10:07:56                                 
     Document Registration                                                     
       

 

 [id number]            2016 10:07:54                                 
     Document Registration                                                     
       

 

 [id number]            2016 10:07:51                                 
     Document Registration                                                     
       

 

 [id number]            2016 10:07:49                                 
     Document Registration                                                     
       

 

 [id number]            2016 10:07:47                                 
     Document Registration                                                     
       

 

 [id number]            2016 10:07:45                                 
     Document Registration                                                     
       

 

 [id number]            2016 10:07:42                                 
     Document Registration                                                     
       

 

 [id number]            2016 10:07:40                                 
     Document Registration                                                     
       

 

 [id number]            2016 10:07:38                                 
     Document Registration                                                     
       

 

 [id number]            2016 10:07:36                                 
     Document Registration                                                     
       

 

 [id number]            2016 10:07:34                                 
     Document Registration                                                     
       

 

 [id number]            2016 11:22:30                                 
     Document Registration                                                     
       

 

 [id number]            2016 11:22:27                                 
     Document Registration                                                     
       

 

 [id number]            2016 11:22:25                                 
     Document Registration                                                     
       

 

 [id number]            2016 11:22:23                                 
     Document Registration                                                     
       

 

 [id number]            2016 11:22:21                                 
     Document Registration                                                     
       

 

 [id number]            2016 11:22:19                                 
     Document Registration                                                     
       

 

 [id number]            2016 11:22:17                                 
     Document Registration                                                     
       

 

 [id number]            2016 11:22:14                                 
     Document Registration                                                     
       

 

 [id number]            2016 11:22:12                                 
     Document Registration                                                     
       

 

 [id number]            2016 11:22:10                                 
     Document Registration                                                     
       

 

 [id number]            2016 11:22:08                                 
     Document Registration                                                     
       

 

 [id number]            2016 11:22:06                                 
     Document Registration                                                     
       

 

 [id number]            2016 11:22:03                                 
     Document Registration                                                     
       

 

 [id number]            2016 11:22:01                                 
     Document Registration                                                     
       

 

 [id number]            2016 11:21:59                                 
     Document Registration                                                     
       

 

 [id number]            2016 11:21:57                                 
     Document Registration                                                     
       

 

 [id number]            2016 11:21:55                                 
     Document Registration                                                     
       

 

 [id number]            2016 11:21:52                                 
     Document Registration                                                     
       

 

 [id number]            2015 00:57:09                                 
     Document Registration                                                     
       

 

 [id number]            2015 00:57:07                                 
     Document Registration                                                     
       

 

 [id number]            2015 00:57:05                                 
     Document Registration                                                     
       

 

 [id number]            2015 00:57:03                                 
     Document Registration                                                     
       

 

 [id number]            2015 00:57:00                                 
     Document Registration                                                     
       

 

 [id number]            2015 00:56:58                                 
     Document Registration                                                     
       

 

 [id number]            2015 00:56:56                                 
     Document Registration                                                     
       

 

 [id number]            2015 00:56:53                                 
     Document Registration                                                     
       

 

 [id number]            2015 00:56:49                                 
     Document Registration                                                     
       

 

 [id number]            2015 00:56:44                                 
     Document Registration                                                     
       

 

 [id number]            2015 00:56:41                                 
     Document Registration                                                     
       

 

 [id number]            2015 00:56:39                                 
     Document Registration                                                     
       

 

 [id number]            2015 00:56:37                                 
     Document Registration                                                     
       

 

 [id number]            2015 00:56:25                                 
     Document Registration                                                     
       

 

 [id number]            2015 00:52:59                                 
     Document Registration                                                     
       

 

 [id number]            2015 14:30:27                                 
     Document Registration                                                     
       

 

 [id number]            2015 10:42:18                                 
     Document Registration                                                     
       

 

 [id number]            2015 12:58:00                                   
   Document Registration                                                       
     

 

 [id number]            2015 00:48:17                                 
     Document Registration                                                     
       

 

 [id number]            2015 00:48:16                                 
     Document Registration                                                     
       

 

 [id number]            2015 01:47:17                                 
     Document Registration                                                     
       

 

 [id number]            2015 01:47:15                                 
     Document Registration                                                     
       

 

 [id number]            2015 01:47:13                                 
     Document Registration                                                     
       

 

 [id number]            2015 01:47:11                                 
     Document Registration                                                     
       

 

 [id number]            2015 01:47:08                                 
     Document Registration                                                     
       

 

 [id number]            2015 01:47:06                                 
     Document Registration                                                     
       

 

 [id number]            2015 01:50:53                                 
     Document Registration                                                     
       

 

 [id number]            2015 13:47:30                                 
     Document Registration                                                     
       

 

 [id number]            2015 13:47:29                                 
     Document Registration                                                     
       

 

 [id number]            2015 15:15:33                                 
     Document Registration                                                     
       

 

 [id number]            2015 01:42:24                                 
     Document Registration                                                     
       

 

 [id number]            2015 01:42:23                                 
     Document Registration                                                     
       

 

 [id number]            2015 01:42:22                                 
     Document Registration                                                     
       

 

 [id number]            2015 01:42:21                                 
     Document Registration                                                     
       

 

 [id number]            2015 01:42:20                                 
     Document Registration                                                     
       

 

 [id number]            2015 01:42:19                                 
     Document Registration                                                     
       

 

 [id number]            2015 01:42:18                                 
     Document Registration                                                     
       

 

 [id number]            2015 01:42:16                                 
     Document Registration                                                     
       

 

 [id number]            2015 01:42:15                                 
     Document Registration                                                     
       

 

 [id number]            2015 15:27:32                                 
     Document Registration                                                     
       

 

 [id number]            2015 17:35:00                                   
   Document Registration                                                       
     

 

 [id number]            2015 17:17:00                                   
   Document Registration                                                       
     

 

 [id number]            2015 08:26:13                                 
     Document Registration                                                     
       

 

 [id number]            2015 18:53:54                                 
     Document Registration                                                     
       

 

 [id number]            2015 15:13:28                                 
     Document Registration                                                     
       

 

 [id number]            2015 15:13:26                                 
     Document Registration                                                     
       

 

 [id number]            2015 15:13:25                                 
     Document Registration                                                     
       

 

 [id number]            2015 14:09:28                                 
     Document Registration                                                     
       

 

 [id number]            2015 14:09:26                                 
     Document Registration                                                     
       

 

 [id number]            2015 14:09:25                                 
     Document Registration                                                     
       

 

 [id number]            2015 14:09:24                                 
     Document Registration                                                     
       

 

 [id number]            2015 14:09:23                                 
     Document Registration                                                     
       

 

 [id number]            2015 14:09:22                                 
     Document Registration                                                     
       

 

 [id number]            2015 14:09:21                                 
     Document Registration                                                     
       

 

 [id number]            2015 14:09:20                                 
     Document Registration                                                     
       

 

 [id number]            2015 14:09:18                                 
     Document Registration                                                     
       

 

 [id number]            2015 14:09:17                                 
     Document Registration                                                     
       

 

 [id number]            2015 14:09:16                                 
     Document Registration                                                     
       

 

 [id number]            2015 14:09:15                                 
     Document Registration                                                     
       

 

 [id number]            2015 14:09:14                                 
     Document Registration                                                     
       

 

 [id number]            2015 14:09:13                                 
     Document Registration                                                     
       

 

 [id number]            2015 14:05:57                                 
     Document Registration                                                     
       

 

 [id number]            2015 14:05:55                                 
     Document Registration                                                     
       

 

 [id number]            2015 14:05:54                                 
     Document Registration                                                     
       

 

 [id number]            2015 12:49:00                                   
   Document Registration                                                       
     

 

 [id number]            2018 14:31:00            2018 23:59:59    
        CLS            [facility]            01.ED                     

 

 [id number]            2017 18:02:00            2017 23:59:59    
        CLS            [facility]            ED                     

 

 [id number]            2017 17:27:00            2017 23:59:59    
        CLS            [facility]            ED                     

 

 [id number]            2016 15:25:00            2016 17:50:00    
        DIS            Emergency            SAMMI BARKER, SANDEEP MONTENEGRO            
[facility]                     

 

 [id number]            01/15/2015 17:34:00                                   
   Document Registration                                                       
     

 

 [id number]            10/11/2014 21:38:00                                   
   Document Registration                                                       
     

 

 [id number]            2016 14:00:00            2016 23:59:59    
        CLS            Outpatient            KRISTINE OBRIEN, ALEXANDRIA MISHRA            
[facility]                     

 

 [id number]            2016 18:27:00            2016 23:59:59    
        CLS            [facility]            ED                     

 

 [id number]            2019 11:32:00            2019 23:59:59    
        CLS            [facility]            01.ED                     

 

 [id number]            2016 19:56:00                                   
   Document Registration                                                       
     

 

 [id number]            2017 19:32:00            2017 14:40:00    
        DIS            Inpatient            GINA OBRIEN, DEANNA FERMIN            
[facility]            4MED E                     

 

 [id number]            08/15/2017 11:56:00            2017 18:10:00    
        DIS            Inpatient            CESARIO OBRIEN, CORINNA BIRMINGHAM            
[facility]            4MED W                     

 

 [id number]            2017 10:48:00            2017 16:35:00    
        DIS            Outpatient            GINA OBRIEN, DEANNA FERMIN            
[facility]            AMBS                     

 

 [id number]            2017 08:36:00            2017 18:54:00    
        DIS            Inpatient            CESARIO OBRIEN, CORINNA BIRMINGHAM            
[facility]            5SUR E                     

 

 [id number]            2017 18:56:00            03/15/2017 12:23:00    
        DIS            Inpatient            JOSE OBRIEN, HERMINIA RODRIGUEZ            
[facility]            4MED W                     

 

 [id number]            2016 04:17:00            2016 12:58:00    
        DIS            Inpatient            CAITLYN OBRIEN, JANNETH MISHRA            
[facility]            3SE                     

 

 [id number]            2016 19:56:00            2016 16:27:00    
        DIS            Inpatient            CESARIO OBRIEN, CORINNA BIRMINGHAM            
[facility]            3SE                     

 

 [id number]            2016 09:51:00            2016 12:18:00    
        DIS            Outpatient            KRISTINE OBRIEN, ALEXANDRIA MISHRA            
[facility]            SP

## 2019-03-03 NOTE — XMS REPORT
[facility], [facility].

 Created on: 2014



Ingrid Riojas

External Reference #: [id number]

: 1960

Sex: Female



Demographics







 Address  [address]  [address]

 

 Home Phone  [phone]

 

 Preferred Language  Unknown

 

 Marital Status  Unknown

 

 [employer] Affiliation  Unknown

 

 Race  White

 

 Ethnic Group  Not  or 





Author







 Kimi Horner  [employer]

 

 Address  Unknown

 

 Phone  Unavailable







Care Team Providers







 Care Team Member Name  Role  Phone

 

 Kimi Corrales  CP  Unavailable



                                                                



Allergies, Adverse Reactions, Alerts

          





 Substance  Reaction  Event Type

 

 Penicillin  Info Not Available  Drug Allergy



                                                                               
         



Problems

          





 Problem Type  Condition  ICD-9 Code  Onset Dates  Condition Status

 

 Problem  Hypertension, benign  401.1     Active

 

 Assessment  Urinary tract infection, site not specified  599.0     Active

 

 Problem  Diabetes Mellitus Type 2, not stated as uncontrolled  250.00     
Active

 

 Assessment  Hypertension, benign  401.1     Active

 

 Assessment  Need for prophylactic vaccination and inoculation, Other viral 
diseases  V04.89     Active

 

 Assessment  Diabetes Mellitus Type 2, not stated as uncontrolled  250.00     
Active

 

 Assessment  Personal history of arthritis  V13.4     Active



                                                                               
                                                                     



Medications

          





 Medication  Code System  Code  Instructions  Start Date  End Date  Status  
Dosage

 

 Meloxicam  NDC  [address]-83  15 MG Orally Once a day  2014  Dec 25, 
2014  Active  1 tablet

 

 Lisinopril  NDC  [address]-66  5 MG Orally Once a day        Active  1 tablet

 

 Humalog  NDC  [address]-49  100 UNIT/ML Subcutaneous TID        Active  8 
units

 

 Bactrim DS  NDC  [address]-41  800-160 MG Orally Twice a day  2014  
Dec 02, 2014  Active  1 tablet

 

 NovoFine  NDC  20957  32G X 6 MM with pen QID  2014     Active  as 
directed

 

 Metformin HCl  NDC  [address]-23  500 MG Orally once a day for 2 wks then BID
  2014     Active  1 tablet with meals

 

 Lantus  NDC  [address]-77  100 UNIT/ML Subcutaneous at bedtime        Active  
12 units

 

 Lantus SoloStar  NDC  [address]-59  100 UNIT/ML Subcutaneous Once a day at 
bedtime  2014     Active  12 units

 

 Humalog KwikPen  NDC  [address]-03  100 UNIT/ML Subcutaneous 3 times a day  
2014     Active  8 units

 

 Acetaminophen  NDC  [address]-58  500 MG Orally prn        Active  1 capsule 
as needed



                                                                               
                                                                               
                    



Procedures

          





 Procedure  Coding System  Code  Date

 

 URINE CULTURE  CPT-4  73115  2014

 

 OFFICE VISIT, EST-MOD. COMPLEXITY (25 MIN)  CPT-4  37628  2014

 

 URINALYSIS, IN HOUSE  CPT-4  83166  2014

 

 ADMINISTRATION, 1ST IMMUNIZATION  CPT-4  57316  2014

 

 FLU VACCINE NO PRESERV 3 & >  CPT-4  74432  2014



                                                                               
                                                           



Vital Signs

          





 Date/Time:  2014

 

 Height  66.5 inches

 

 Weight  158.8 lbs

 

 Temperature  98.3 F

 

 Blood Pressure Diastolic  72 mm Hg

 

 Blood Pressure Systolic  160 mm Hg

 

 Cardiac Monitoring Heart Rate  112 Beats per Minute

 

 BMI  25.24 Index

 

 Respiratory Rate  16 per Minute



                                                                    



Results

          





 Name  Result  Date  Reference Range  Unit









 In House Urinalysis, automated

 

 Urine Culture



                                                                               
                   



Immunizations

          





 Vaccine  Administration Date

 

 Influenza shot 4 y.o. and older  2014



                                                                    



Summary Purpose

          eClinicalWorks Submission

## 2019-03-03 NOTE — XMS REPORT
[facility] Continuity Of Care Document

 Created on: 2017



KIMBERLY KINCAID

External Reference #: [id number]

: 1960

Sex: Female



Demographics







 Address  [address]  [address]

 

 Preferred Language  Unknown

 

 Marital Status  Never 

 

 Holiness Affiliation  Unknown

 

 Race  White

 

 Ethnic Group  Unknown





Author







 Author  [facility]

 

 Organization  [facility]

 

 Address  [address] [place]  [address]



 

 Phone  [phone]







Support







 Name  Relationship  Address  Phone

 

 OLIVIA KINCAID  Next Of Kin  [address]  [address]  [phone]

 

 OLIVIA KINCAID  ECON  [address]  [address]  [phone]







Care Team Providers







 Care Team Member Name  Role  Phone

 

 UNASSIGNED, ED PHYSICIAN  Unavailable  Unavailable

 

 BRITTANI OBRIEN, CHERYL QUAN  Unavailable  [phone]

 

 PALLAVI STEPHENS  PP  [phone]

 

 ADIS OBRIEN, RANULFO LUQUE  CP  [phone]

 

 CESARIO OBRIEN, CORINNA [place]  AT  [phone]







Results







 Lab Results 

 

 Visit/Account #[id number] (2017 6:54pm - 2017 6:10pm) 

 

 Test  Result  Date/Time

 

 POCGL 

 

 POCGL( MG/DL)

  103 MG/DL

  2017 6:57am











 136 MG/DL

  2017 11:28am



 

 100 MG/DL

  2017 4:41pm



 

 112 MG/DL

  2017 9:05pm



 

 85 MG/DL

  2017 6:39am



 

 139 MG/DL

  2017 12:08pm



 

 177 MG/DL

  2017 4:42pm



 

 206 MG/DL

  2017 8:01pm



 

 109 MG/DL

  2017 5:09am



 

 126 MG/DL

  2017 11:37am



 

 217 MG/DL

  2017 5:13pm



 

 207 MG/DL

  2017 8:18pm



 

 81 MG/DL

  August 15, 2017 6:28am



 

 152 MG/DL

  August 15, 2017 11:56am



 

 115 MG/DL

  August 15, 2017 4:52pm



 

 203 MG/DL

  August 15, 2017 10:26pm



 

 64 MG/DL

  2017 5:09am



 

 82 MG/DL

  2017 5:32am



 

 85 MG/DL

  2017 5:44am



 

 104 MG/DL

  2017 7:27am



 

 147 MG/DL

  2017 11:02am











 HEMOGLOBIN AND HEMATOCRIT 

 

 HEMOGLOBIN(12.0-16.0 G/DL)

  9.6 G/DL

  2017 4:55pm



 

 HEMATOCRIT(36.0-46.0 %)

  30.0 %

  2017 4:55pm



 

 COMPLETE BLOOD COUNT WITH DIFF 

 

 WHITE BLOOD COUNT(4.0-11.0 10E3/UL)

  7.1 10E3/UL

  2017 7:10pm











 5.3 10E3/UL

  2017 5:48am



 

 6.3 10E3/UL

  2017 5:47am



 

 6.8 10E3/UL

  2017 7:13am



 

 6.1 10E3/UL

  August 15, 2017 8:23am











 RED BLOOD COUNT(4.00-5.20 10E6/UL)

  2.58 10E6/UL

  2017 7:10pm











 2.21 10E6/UL

  2017 5:48am



 

 3.40 10E6/UL

  2017 5:47am



 

 3.29 10E6/UL

  2017 7:13am



 

 3.20 10E6/UL

  August 15, 2017 8:23am











 HEMOGLOBIN(12.0-16.0 G/DL)

  7.7 G/DL

  2017 7:10pm











 6.6 G/DL

  2017 5:48am



 

 9.7 G/DL

  2017 5:47am



 

 9.5 G/DL

  2017 7:13am



 

 9.4 G/DL

  August 15, 2017 8:23am











 HEMATOCRIT(36.0-46.0 %)

  24.8 %

  2017 7:10pm











 21.4 %

  2017 5:48am



 

 30.5 %

  2017 5:47am



 

 29.8 %

  2017 7:13am



 

 29.3 %

  August 15, 2017 8:23am











 MEAN CORPUSCULAR VOLUME(82.0-100.0 FL)

  96.1 FL

  2017 7:10pm











 96.8 FL

  2017 5:48am



 

 89.7 FL

  2017 5:47am



 

 90.6 FL

  2017 7:13am



 

 91.6 FL

  August 15, 2017 8:23am











 MEAN CORPUSCULAR HEMOGLOBIN(26.0-34.0 PG)

  29.8 PG

  2017 7:10pm











 29.9 PG

  2017 5:48am



 

 28.5 PG

  2017 5:47am



 

 28.9 PG

  2017 7:13am



 

 29.4 PG

  August 15, 2017 8:23am











 MEAN CORPUSCULAR HGB CONC(31.5-36.5 G/DL)

  31.0 G/DL

  2017 7:10pm











 30.8 G/DL

  2017 5:48am



 

 31.8 G/DL

  2017 5:47am



 

 31.9 G/DL

  2017 7:13am



 

 32.1 G/DL

  August 15, 2017 8:23am











 RED CELL DISTRIBUTION WIDTH(11.5-14.5 %)

  13.6 %

  2017 7:10pm











 13.6 %

  2017 5:48am



 

 15.4 %

  2017 5:47am



 

 14.7 %

  2017 7:13am



 

 14.4 %

  August 15, 2017 8:23am











 777-3: PLATELET COUNT(150-450 10E3/UL)

  176 10E3/UL

  2017 7:10pm











 152 10E3/UL

  2017 5:48am



 

 152 10E3/UL

  2017 5:47am



 

 148 10E3/UL

  2017 7:13am



 

 138 10E3/UL

  August 15, 2017 8:23am











 MEAN PLATELET VOLUME(8.2-12.4 FL)

  11.1 FL

  2017 7:10pm











 11.4 FL

  2017 5:48am



 

 10.9 FL

  2017 5:47am



 

 10.9 FL

  2017 7:13am



 

 10.9 FL

  August 15, 2017 8:23am











 NEUTROPHILS % (AUTO)(40-70 %)

  63 %

  2017 7:10pm











 54 %

  2017 5:48am



 

 65 %

  2017 5:47am



 

 59 %

  2017 7:13am



 

 55 %

  August 15, 2017 8:23am











 LYMPHOCYTES % (AUTO)(15-45 %)

  25 %

  2017 7:10pm











 31 %

  2017 5:48am



 

 23 %

  2017 5:47am



 

 27 %

  2017 7:13am



 

 29 %

  August 15, 2017 8:23am











 MONOCYTES % (AUTO)(2-10 %)

  9 %

  2017 7:10pm











 11 %

  2017 5:48am



 

 10 %

  2017 5:47am



 

 12 %

  2017 7:13am



 

 13 %

  August 15, 2017 8:23am











 EOSINOPHILS % (AUTO)(0-6 %)

  3 %

  2017 7:10pm











 3 %

  2017 5:48am



 

 2 %

  2017 5:47am



 

 2 %

  2017 7:13am



 

 2 %

  August 15, 2017 8:23am











 BASOPHILS % (AUTO)(0-1 %)

  0 %

  2017 7:10pm











 0 %

  2017 5:48am



 

 1 %

  2017 5:47am



 

 0 %

  2017 7:13am



 

 1 %

  August 15, 2017 8:23am











 IMMATURE GRANS % (AUTO)(0-0 %)

  0 %

  2017 7:10pm











 0 %

  2017 5:48am



 

 0 %

  2017 5:47am



 

 0 %

  2017 7:13am



 

 0 %

  August 15, 2017 8:23am











 NUCLEATED RBCS (AUTO)(0-0 %)

  0 %

  2017 7:10pm











 0 %

  2017 5:48am



 

 0 %

  2017 5:47am



 

 0 %

  2017 7:13am



 

 0 %

  August 15, 2017 8:23am











 NEUTROPHILS # (AUTO)(2.5-7.5 10E3/UL)

  4.5 10E3/UL

  2017 7:10pm











 2.9 10E3/UL

  2017 5:48am



 

 4.0 10E3/UL

  2017 5:47am



 

 4.0 10E3/UL

  2017 7:13am



 

 3.4 10E3/UL

  August 15, 2017 8:23am











 LYMPHOCYTES # (AUTO)(1.0-4.0 10E3/UL)

  1.8 10E3/UL

  2017 7:10pm











 1.7 10E3/UL

  2017 5:48am



 

 1.5 10E3/UL

  2017 5:47am



 

 1.8 10E3/UL

  2017 7:13am



 

 1.8 10E3/UL

  August 15, 2017 8:23am











 MONOCYTES # (AUTO)(0.2-0.8 10E3/UL)

  0.6 10E3/UL

  2017 7:10pm











 0.6 10E3/UL

  2017 5:48am



 

 0.6 10E3/UL

  2017 5:47am



 

 0.8 10E3/UL

  2017 7:13am



 

 0.8 10E3/UL

  August 15, 2017 8:23am











 EOSINOPHILS # (AUTO)(0.0-0.4 10E3/UL)

  0.2 10E3/UL

  2017 7:10pm











 0.2 10E3/UL

  2017 5:48am



 

 0.1 10E3/UL

  2017 5:47am



 

 0.1 10E3/UL

  2017 7:13am



 

 0.2 10E3/UL

  August 15, 2017 8:23am











 BASOPHILS # (AUTO)(0.0-0.2 10E3/UL)

  0.0 10E3/UL

  2017 7:10pm











 0.0 10E3/UL

  2017 5:48am



 

 0.0 10E3/UL

  2017 5:47am



 

 0.0 10E3/UL

  2017 7:13am



 

 0.0 10E3/UL

  August 15, 2017 8:23am











 IMMATURE GRANS # (AUTO)(0.0-0.0 10E3/UL)

  0.0 10E3/UL

  2017 7:10pm











 0.0 10E3/UL

  2017 5:48am



 

 0.0 10E3/UL

  2017 5:47am



 

 0.0 10E3/UL

  2017 7:13am



 

 0.0 10E3/UL

  August 15, 2017 8:23am











 DIFF TYPE 

  AUTOMATED

  2017 7:10pm











 AUTOMATED

  2017 5:48am



 

 AUTOMATED

  2017 5:47am



 

 AUTOMATED

  2017 7:13am



 

 AUTOMATED

  August 15, 2017 8:23am











 UA WITH SCREEN FOR CULTURE 

 

 COLOR,URINE 

  LIGHT YELLOW

  2017 9:28pm



 

 CLARITY,URINE 

  CLEAR

  2017 9:28pm



 

 GLUCOSE, URINE(NEGATIVE MG/DL)

  TRACE MG/DL

  2017 9:28pm



 

 URINE BILIRUBIN(NEGATIVE)

  NEGATIVE

  2017 9:28pm



 

 KETONES,URINE(NEGATIVE MG/DL)

  NEGATIVE MG/DL

  2017 9:28pm



 

 URINE SPECIFIC GRAVITY(1.001-1.035)

  1.011

  2017 9:28pm



 

 URINE BLOOD(NEGATIVE)

  NEGATIVE

  2017 9:28pm



 

 URINE PH(5.0-9.0)

  6.5

  2017 9:28pm



 

 URINE PROTEIN(Less than 20 MG/DL)

  Greater than or equal to 300 MG/DL

  2017 9:28pm



 

 URINE UROBILINOGEN(0.2-1.0 MG/DL)

  0.2-1.0 MG/DL

  2017 9:28pm



 

 URINE NITRITE(NEGATIVE)

  NEGATIVE

  2017 9:28pm



 

 LEUKOCYTE ESTERASE ,URINE(NEGATIVE)

  NEGATIVE

  2017 9:28pm



 

 URINE RBCS(0-3 /HPF)

  4-7 /HPF

  2017 9:28pm



 

 URINE WBCS(0-3 /HPF)

  4-7 /HPF

  2017 9:28pm



 

 URINE EPITHELIAL CELLS(0-3 /HPF)

  8-12 /HPF

  2017 9:28pm



 

 URINE HYALINE CASTS(0-3 /LPF)

  4-7 /LPF

  2017 9:28pm



 

 URINE BACTERIA(NEGATIVE /HPF)

  1+ /HPF

  2017 9:28pm



 

 URINE CULTURE 

  NOT INDICATED

  2017 9:28pm



 

 URINE MICROSCOPIC REQUIRED 

  YES

  2017 9:28pm



 

 COMPLETE METABOLIC PROFILE 

 

 GLUCOSE( MG/DL)

  88 MG/DL

  2017 7:10pm











 108 MG/DL

  2017 5:48am



 

 94 MG/DL

  2017 5:47am



 

 81 MG/DL

  2017 7:13am



 

 75 MG/DL

  August 15, 2017 8:23am











 BLOOD UREA NITROGEN(6-20 MG/DL)

  85 MG/DL

  2017 7:10pm











 87 MG/DL

  2017 5:48am



 

 55 MG/DL

  2017 5:47am



 

 59 MG/DL

  2017 7:13am



 

 36 MG/DL

  August 15, 2017 8:23am











 CREATININE(0.50-1.20 MG/DL)

  5.81 MG/DL

  2017 7:10pm











 5.81 MG/DL

  2017 5:48am



 

 4.46 MG/DL

  2017 5:47am



 

 5.08 MG/DL

  2017 7:13am



 

 3.71 MG/DL

  August 15, 2017 8:23am











 EST GLOMERULAR FILTRATION RATE(Greater than or equal to 60)

  7



Result Comments:



If the patient is of -American descent/extraction 

multiply the eGFR value by 1.212 to obtain the actual eGFR.  

  

>=60  mg/dL    Normal  

 30-59 mg/dL    Moderate Kidney Disease  

 15-29 mg/dL    Severe Kidney Disease  

<15   mg/dL    Kidney Failure

  2017 7:10pm











 7



Result Comments:



If the patient is of -American descent/extraction 

multiply the eGFR value by 1.212 to obtain the actual eGFR.  

  

>=60  mg/dL    Normal  

 30-59 mg/dL    Moderate Kidney Disease  

 15-29 mg/dL    Severe Kidney Disease  

<15   mg/dL    Kidney Failure

  2017 5:48am



 

 10



Result Comments:



If the patient is of -American descent/extraction 

multiply the eGFR value by 1.212 to obtain the actual eGFR.  

  

>=60  mg/dL    Normal  

 30-59 mg/dL    Moderate Kidney Disease  

 15-29 mg/dL    Severe Kidney Disease  

<15   mg/dL    Kidney Failure

  2017 5:47am



 

 9



Result Comments:



If the patient is of -American descent/extraction 

multiply the eGFR value by 1.212 to obtain the actual eGFR.  

  

>=60  mg/dL    Normal  

 30-59 mg/dL    Moderate Kidney Disease  

 15-29 mg/dL    Severe Kidney Disease  

<15   mg/dL    Kidney Failure

  2017 7:13am



 

 13



Result Comments:



If the patient is of -American descent/extraction 

multiply the eGFR value by 1.212 to obtain the actual eGFR.  

  

>=60  mg/dL    Normal  

 30-59 mg/dL    Moderate Kidney Disease  

 15-29 mg/dL    Severe Kidney Disease  

<15   mg/dL    Kidney Failure

  August 15, 2017 8:23am











 BUN CREATININE RATIO(10.0-20.0 RATIO)

  15.0 RATIO

  2017 7:10pm











 15.0 RATIO

  2017 5:48am



 

 12.0 RATIO

  2017 5:47am



 

 12.0 RATIO

  2017 7:13am



 

 10.0 RATIO

  August 15, 2017 8:23am











 SODIUM(135-145 MMOL/L)

  138 MMOL/L

  2017 7:10pm











 140 MMOL/L

  2017 5:48am



 

 140 MMOL/L

  2017 5:47am



 

 140 MMOL/L

  2017 7:13am



 

 141 MMOL/L

  August 15, 2017 8:23am











 POTASSIUM(3.6-5.0 MMOL/L)

  5.4 MMOL/L

  2017 7:10pm











 5.0 MMOL/L

  2017 5:48am



 

 4.7 MMOL/L

  2017 5:47am



 

 4.5 MMOL/L

  2017 7:13am



 

 4.1 MMOL/L

  August 15, 2017 8:23am











 CHLORIDE(101-111 MMOL/L)

  111 MMOL/L

  2017 7:10pm











 115 MMOL/L

  2017 5:48am



 

 106 MMOL/L

  2017 5:47am



 

 107 MMOL/L

  2017 7:13am



 

 101 MMOL/L

  August 15, 2017 8:23am











 CO2(21-31 MMOL/L)

  16 MMOL/L

  2017 7:10pm











 17 MMOL/L

  2017 5:48am



 

 25 MMOL/L

  2017 5:47am



 

 25 MMOL/L

  2017 7:13am



 

 31 MMOL/L

  August 15, 2017 8:23am











 ANION GAP(8-18)

  16

  2017 7:10pm











 13

  2017 5:48am



 

 14

  2017 5:47am



 

 13

  2017 7:13am



 

 13

  August 15, 2017 8:23am











 OSMO CALCULATED(270.0-290.0)

  300.9

  2017 7:10pm











 306.5

  2017 5:48am



 

 294.3

  2017 5:47am



 

 295.0

  2017 7:13am



 

 288.3

  August 15, 2017 8:23am











 CALCIUM(8.5-10.5 MG/DL)

  8.1 MG/DL

  2017 7:10pm











 7.6 MG/DL

  2017 5:48am



 

 8.3 MG/DL

  2017 5:47am



 

 8.3 MG/DL

  2017 7:13am



 

 8.5 MG/DL

  August 15, 2017 8:23am











 BILIRUBIN,TOTAL(0.1-1.2 MG/DL)

  0.4 MG/DL

  2017 7:10pm











 0.3 MG/DL

  2017 5:48am



 

 0.7 MG/DL

  2017 5:47am



 

 0.3 MG/DL

  2017 7:13am



 

 0.5 MG/DL

  August 15, 2017 8:23am











 ALKALINE PHOSPHATASE( IU/L)

  113 IU/L

  2017 7:10pm











 100 IU/L

  2017 5:48am



 

 96 IU/L

  2017 5:47am



 

 87 IU/L

  2017 7:13am



 

 88 IU/L

  August 15, 2017 8:23am











 ASPARTATE AMINO TRANSFERASE(10-42 IU/L)

  23 IU/L

  2017 7:10pm











 20 IU/L

  2017 5:48am



 

 14 IU/L

  2017 5:47am



 

 13 IU/L

  2017 7:13am



 

 13 IU/L

  August 15, 2017 8:23am











 ALANINE AMINOTRANSFERASE(10-60 IU/L)

  40 IU/L

  2017 7:10pm











 33 IU/L

  2017 5:48am



 

 26 IU/L

  2017 5:47am



 

 20 IU/L

  2017 7:13am



 

 19 IU/L

  August 15, 2017 8:23am











 TOTAL PROTEIN(6.4-8.2 G/DL)

  7.1 G/DL

  2017 7:10pm











 6.1 G/DL

  2017 5:48am



 

 6.8 G/DL

  2017 5:47am



 

 6.4 G/DL

  2017 7:13am



 

 6.1 G/DL

  August 15, 2017 8:23am











 ALBUMIN(3.5-5.5 G/DL)

  3.4 G/DL

  2017 7:10pm











 3.1 G/DL

  2017 5:48am



 

 3.4 G/DL

  2017 5:47am



 

 3.2 G/DL

  2017 7:13am



 

 3.1 G/DL

  August 15, 2017 8:23am











 GLOBULIN(2.4-3.6)

  3.7

  2017 7:10pm











 3.0

  2017 5:48am



 

 3.4

  2017 5:47am



 

 3.2

  2017 7:13am



 

 3.0

  August 15, 2017 8:23am











 ALBUMIN/GLOBULIN RATIO(0.9-1.8 RATIO)

  0.9 RATIO

  2017 7:10pm











 1.0 RATIO

  2017 5:48am



 

 1.0 RATIO

  2017 5:47am



 

 1.0 RATIO

  2017 7:13am



 

 1.0 RATIO

  August 15, 2017 8:23am











 BASIC METABOLIC PANEL 

 

 GLUCOSE( MG/DL)

  111 MG/DL

  2017 2:14am



 

 BLOOD UREA NITROGEN(6-20 MG/DL)

  86 MG/DL

  2017 2:14am



 

 CREATININE(0.50-1.20 MG/DL)

  5.84 MG/DL

  2017 2:14am



 

 EST GLOMERULAR FILTRATION RATE(Greater than or equal to 60)

  7



Result Comments:



If the patient is of -American descent/extraction 

multiply the eGFR value by 1.212 to obtain the actual eGFR.  

  

>=60  mg/dL    Normal  

 30-59 mg/dL    Moderate Kidney Disease  

 15-29 mg/dL    Severe Kidney Disease  

<15   mg/dL    Kidney Failure

  2017 2:14am



 

 BUN CREATININE RATIO(10.0-20.0 RATIO)

  15.0 RATIO

  2017 2:14am



 

 SODIUM(135-145 MMOL/L)

  140 MMOL/L

  2017 2:14am



 

 POTASSIUM(3.6-5.0 MMOL/L)

  5.2 MMOL/L

  2017 2:14am



 

 CHLORIDE(101-111 MMOL/L)

  114 MMOL/L

  2017 2:14am



 

 CO2(21-31 MMOL/L)

  16 MMOL/L

  2017 2:14am



 

 ANION GAP(8-18)

  15

  2017 2:14am



 

 OSMO CALCULATED(270.0-290.0)

  306.3

  2017 2:14am



 

 CALCIUM(8.5-10.5 MG/DL)

  7.5 MG/DL

  2017 2:14am



 

 CARDIAC TROPONIN I 

 

 CARDIAC TROPONIN I(0.01-0.04 NG/ML)

  0.01 NG/ML



Result Comments:



REFERENCE RANGES:  

    NEGATIVE                             < 0.04 NG/ML  

    POSSIBLE MYCARDIAL INVOLVEMENT     >/=0.04 NG/ML   

  

INTERPRET TROPONIN I RESULT IN LIGHT OF THE TOTAL CLINICAL 

PRESENTATION INCLUDING CLINICAL HISTORY.  ANY CONDITION 

RESULTING IN MYOCARDIAL INJURY CAN POTENTIALLY ELEVATE 

TROPONIN I LEVELS ABOVE EXPECTED NORMAL RANGES.  

  

** NOTE NEW REFERENCE RANGE **

  2017 7:10pm



 

 THYROID STIMULATING HORMONE 

 

 THYROID STIMULATING HORMONE(0.340-5.600 uIU/ML)

  1.270 uIU/ML

  2017 7:10pm



 

 GLYCOHEMOGLOBIN A1C 

 

 %A1C(4.8-5.6 %)

  5.1 %

  2017 7:13am



 

 ERYTHROPOIETIN 

 

 ERYTHROPOIETIN(2.6-18.5 mIU/mL)

  16.9 mIU/mL



Result Comments:



Performed at:  [address]  [id number]  

: YANIRA Laureano MD, Phone:  [phone]  

  2017 12:14pm



 

 HEPATITIS B SURFACE ANTIGEN 

 

 HEPATITIS B SURFACE AG SCREEN(Negative)

  Negative

  2017 12:14pm



 

 HEPATITIS B SURFACE AB QUAL 

 

 HEPATITIS B SURFACE AB QUAL(.)

  Non Reactive



Result Comments:



              Non Reactive: Inconsistent with immunity,  

                            less than 10 mIU/mL  

              Reactive:     Consistent with immunity,  

                            greater than 9.9 mIU/mL  

  2017 12:14pm



 

 HEPATITIS B CORE AB TOTAL 

 

 HEPATITIS B CORE AB TOTAL(Negative)

  Negative



Result Comments:



Performed at:  [address]  [id number]  

: YANIRA Laureano MD, Phone:  [phone]  

  2017 12:14pm



 

 HEPATITIS C ANTIBODY 

 

 HEPATITIS C ANTIBODY(0.0-0.9 s/co ratio)

  Less than 0.1 s/co ratio



Result Comments:



                                  Negative:     < 0.8  

                             Indeterminate: 0.8 - 0.9  

                                  Positive:     > 0.9  

 The CDC recommends that a positive HCV antibody result  

 be followed up with a HCV Nucleic Acid Amplification  

 test ([id number]).  

  2017 12:14pm



 

 HIV 4TH GEN W/REFLEX 

 

 HIV 4TH GENERATION (AG/AB)(Non Reactive)

  Non Reactive



Result Comments:



Performed at:  [address] [address], [place], [place]  [id number]  

: YANIRA Laureano MD, Phone:  [phone]  

  2017 12:14pm













 Microbiology Results 

 

 Visit/Account #[id number] (2017 6:54pm - 2017 6:10pm) 

 

 Procedure  Result

 

 MRSA SCREEN FOR INFEC CONTROL 

 



MRSA SCREEN FOR INFEC CONTROL



 

Result Instance On 2017 11:55pm 

Source: [facility]





Special Result Comments: 



No growth













 Bloodbank Results 

 

 Visit/Account #[id number] (2017 6:54pm - 2017 6:10pm) 

 

 Test  Result

 

 ABO/RH 

 



BLOOD TYPE



  B NEGATIVE on 2017 5:45am



 

 ANTIBODY SCREEN 

 



ANTIBODY SCREEN



  NEGATIVE on 2017 5:45am











Allergies and Adverse Reactions







 Allergies and Adverse Reactions 

 

 Patient Unit Number: [id number] 









 Agent  Type  Reaction  Severity  Status

 

 PENICILLINS

  Drug Allergy

  RASH

  Moderate

  Active



 

 CODEINE

  Drug Allergy

  RASH

  Mild

  Active









Problem List







 Problem List 

 

 Patient Unit Number: [id number] 

 

 Chronic Problems:  

 

 Code/Condition  Comments  Documented Start Date  Documented Resolved Date  Code
(s)

 

  

New onset type 1 diabetes mellitus, uncontrolled

   

   

   

   

ICD9: 250.03 New onset type 1 diabetes mellitus, uncontrolled

SNOMED: 271578601 New onset type 1 diabetes mellitus, uncontrolled



 

  

Depression

   

   

   

   

ICD10: F32.9 Depression

SNOMED: 62676630 Depression



 

  

Tobacco use

   

   

   

   

ICD10: Z72.0 Tobacco use

SNOMED: 607880448 Tobacco use



 

  

End-stage renal disease

   

   

   

   

ICD10: N18.6 End-stage renal disease

SNOMED: 88090723 End-stage renal disease



 

  

Anemia in ESRD (end-stage renal disease)

   

   

   

   

ICD10: N18.6 Anemia in ESRD (end-stage renal disease)

SNOMED: 464429765 Anemia in end-stage renal disease



 

  

Hypertension

   

   

   

   

ICD9: 401.9 Hypertension

SNOMED: 04387670 Hypertension



 

  

Anemia, chronic disease

   

   

   

   

ICD10: D63.8 Anemia, chronic disease

SNOMED: 672737182 Chronic disease anemia



 

  

Anemia

   

   

   

   

ICD10: D64.9 Anemia

SNOMED: 942616240 Anemia



 

  

CHF (congestive heart failure)

   

   

   

   

ICD10: I50.9 CHF (congestive heart failure)

SNOMED: 89684325 CHF (congestive heart failure)



 

  

Diabetes mellitus

   

   

   

   

ICD10: E11.9 Diabetes mellitus

SNOMED: 90005799 Diabetes mellitus



 

  

Type 2 diabetes mellitus

   

   

   

   

ICD10: E11.9 Type 2 diabetes mellitus

SNOMED: 92105791 Type 2 diabetes mellitus



 

  

Acute on chronic renal failure

   

   

   

   

ICD10: N17.9 Acute on chronic renal failure

SNOMED: 162576568 Acute-on-chronic renal failure



 

  

Coronary artery disease

   

   

   

   

ICD10: I25.10 Coronary artery disease

SNOMED: 87727269 Coronary artery disease









Plan of Care







 Plan Of Care 

 

 Visit/Account #[id number] (2017 6:54pm - 2017 6:10pm) 

 

 Patient Instructions 

 

 Instructions

 

  DI for Kidney Failure

DI for Dialysis

Hydralazine

Lisinopril

 









Vital Signs







 Vital Signs 

 

 Visit/Account #[id number] (2017 6:54pm - 2017 6:10pm) 

 

 Sign  First Result  Last Result  Code(s)

 

 Blood Pressure

 

  136/ 53 mm[Hg] On 2017 1:43am

  166/ 64 mm[Hg] On 2017 2:39pm

  8462-4  BP Diastolic

 8480-6  BP Systolic



 

 Heart Rate/Pulse

 

  Pulse Rate (adult):  73 /min On 2017 1:43am

  Pulse Rate (adult):  80 /min On 2017 2:39pm

  8867-4  Heart Rate



 

 Respiratory Rate

 

  Respiratory Rate:  18 /min On 2017 1:43am

  Respiratory Rate:  20 /min On 2017 2:39pm

  9279-1  Respiratory Rate



 

 Temperature in Fahrenheit

 

  Temperature (Fahrenheit):  97.8 [degF] On 2017 6:54pm

  Temperature (Fahrenheit):  98.5 [degF] On 2017 2:39pm

  8310-5  Body Temperature



 

 Weight in Kilograms

 

  Weight (Kilograms):  75.4500 kg On 2017 6:54pm

   

  3141-9  Weight Measured









Functional Status







 Functional and Cognitive Status 









 No Functional Status Data









Medications







 Home Medications - Medications that the patient was taking prior to arrival at 
the hospital 

 

 Visit/Account #[id number] (2017 6:54pm - 2017 6:10pm) 

 

 Medication  Route  Sig/Schedule  Precondition/Indication  Comments/Instructions
  Codes

 

 TYLENOL(ACETAMINOPHEN) 500 MG TABLET

Dose: 500 MG

 

  ORAL

  Q4H

  PAIN OR FEVER

   

  TYLENOL (ACETAMINOPHEN) NDC: 23717133153

 



 

 Aspirin Ec(ASPIRIN) 81 MG TAB

Dose: 81 MG

 

  ORAL

  DAILY

   

   

  Aspirin 81 MG Delayed Release Oral Tablet (RxNorm): 561565

 

Aspirin Ec (ASPIRIN) NDC: 80312501480

 



 

 PROTONIX(PANTOPRAZOLE) 40 MG TAB

Dose: 40 MG

 

  ORAL

  DAILY

   

   

  pantoprazole 40 MG Delayed Release Oral Tablet [Protonix] (RxNorm): 871160

 

PROTONIX (PANTOPRAZOLE) NDC: 56242772548

 



 

 LANTUS PEN(INSULIN GLARGINE) 300 UNIT/3 ML INJECTION

Dose: 12 UNITS

 

  SUBCUTANEOUSLY

  AT BEDTIME

   

   

  3 ML Insulin Glargine 100 UNT/ML Pen Injector [Lantus] (RxNorm): 224147

 

LANTUS PEN (INSULIN GLARGINE) NDC: 43816762609

 



 

 CARVEDILOL(CARVEDILOL) 25 MG TABLET

Dose: 40 MG

 

  ORAL

  DAILY

   

   

  carvedilol 25 MG Oral Tablet (RxNorm): 184378

 

CARVEDILOL (CARVEDILOL) NDC: 56943575632

 



 

 Hydralazine Hcl(HYDRALAZINE HCL) 100 MG TABLET

Dose: 100 MG

 

  ORAL

  TWICE A DAY

   

   

  Hydralazine Hydrochloride 100 MG Oral Tablet (RxNorm): 867021

 

Hydralazine Hcl (HYDRALAZINE HCL) NDC: 15693078912

 



 

 LASIX(FUROSEMIDE) 40 MG TABLET

Dose: 160 MG

 

  ORAL

  DAILY

   

   

  Furosemide 40 MG Oral Tablet [Lasix] (RxNorm): 712147

 

LASIX (FUROSEMIDE) NDC: 85898537629

 



 

 LIVALO(PITAVASTATIN) 2 MG TABLET

Dose: 2 MG

 

  ORAL

  AT BEDTIME

   

   

  pitavastatin 2 MG Oral Tablet [Livalo] (RxNorm): 620887

 

LIVALO (PITAVASTATIN) NDC: 95532105705

 



 

 NORCO 7.5-325 TABLET(HYDROcodone BIT/ACETAMINOPHEN) 1 EACH TABLET

Dose: 1-2 TAB

 

  ORAL

  EVERY 4 HOURS

  PAIN

   

  Acetaminophen 325 MG / Hydrocodone Bitartrate 7.5 MG Oral Tablet [Norco] (
RxNorm): 937760

 

NORCO 7.5-325 TABLET (HYDROcodone BIT/ACETAMINOPHEN) NDC: 68848791520

 



 

 Coreg Cr(CARVEDILOL PHOSPHATE) 40 MG CPMP.24HR

Dose: 40 MG

 

  ORAL

  DAILY

   

   

  24 HR carvedilol phosphate 40 MG Extended Release Oral Capsule [Coreg] (RxNorm
): 143301

 

Coreg Cr (CARVEDILOL PHOSPHATE) NDC: 66592876756

 



 

 APRESOLINE(HydrALAZINE) 50 MG TAB

Dose: 100 MG

 

  ORAL

  3 TIMES A DAY

   

   

  Hydralazine Hydrochloride 50 MG Oral Tablet (RxNorm): 538150

 

APRESOLINE (HydrALAZINE) NDC: 56896921563

 



 

 Prinivil(LISINOPRIL) 40 MG TABLET

Dose: 40 MG

 

  ORAL

  DAILY

   

   

  Lisinopril 40 MG Oral Tablet (RxNorm): 900796

 

Prinivil (LISINOPRIL) NDC: 62589868201

 













 Inpatient/Ordered Medications - Medications administered during hospital visit 

 

 Visit/Account #[id number] (2017 6:54pm - 2017 6:10pm) 

 

 Medication  Route  Sig/Schedule  Precondition/Indication  Comments/Instructions
  Codes

 

 SUBLIMAZE INJ(FentaNYL CITRATE) 100 MCG/2 ML INJECTION

Dose: 1 ML

 

  INTRAVEN

  NOW

   

  Label Comments:

GIVE BY SLOW PUSH OVER 2-5 MIN

MAY INCREASE FALL RISK

 

  SUBLIMAZE INJ (FentaNYL CITRATE) NDC: 72532048062

 



 

 IV Medication

 

Carriers:

 

NORMAL SALINE(SODIUM CHLORIDE) 1000 ML INJECTION

Dose: 1000 ML

 

  INTRAVEN

  .Q1H (Rate: 1000 MLS/HR Duration: 1 HR) 

 

   

   

  Carriers:

 

 1000 ML Sodium Chloride 9 MG/ML Injection (RxNorm): 7345246

 

NORMAL SALINE (SODIUM CHLORIDE) NDC: 81827283037

 



 

 TYLENOL(ACETAMINOPHEN) 325 MG TAB

Dose: 0 MG

 

  ORAL

  Q6H

  PRN Reason: MILD PAIN

  Label Comments:

Do not exceed 4000 mg/24 hours.

 

Special Dose Instructions:

 325 - 650 MG

 

  Acetaminophen 325 MG Oral Tablet (RxNorm): 573391

 

TYLENOL (ACETAMINOPHEN) NDC: 57240483460

 



 

 ZOFRAN ODT(ONDANSETRON HCL) 4 MG TAB

Dose: 4 MG

 

  ORAL

  Q4H

  PRN Reason: NAUSEA/VOMITING

   

  Ondansetron 4 MG Disintegrating Oral Tablet (RxNorm): 564525

 

ZOFRAN ODT (ONDANSETRON HCL) NDC: 51090502457

 



 

 ECOTRIN(ASPIRIN) 81 MG TAB

Dose: 81 MG

 

  ORAL

  DAILY

   

   

  Aspirin 81 MG Delayed Release Oral Tablet (RxNorm): 422412

 

ECOTRIN (ASPIRIN) NDC: 03709543466

 



 

 NORCO 7.5-325(HYDROcodone BIT/ACETAMINOPHEN) 1 TAB TAB

Dose: 0 TAB

 

  ORAL

  EVERY 4 HOURS

  PRN Reason: PAIN

  Label Comments:

<<may be substituted for 7.5/500>>

REC MAX DAILY ACETAMINOPHEN: 4000 MG/24 HR

MAY INCREASE FALL RISK

 

Special Dose Instructions:

 1-2 TABS

 

  Acetaminophen 325 MG / Hydrocodone Bitartrate 7.5 MG Oral Tablet (RxNorm): 
814607

 

NORCO 7.5-325 (HYDROcodone BIT/ACETAMINOPHEN) NDC: 52249201113

 



 

 LANTUS(INSULIN GLARGINE) 1000 UNIT/10 ML INJECTION

Dose: 0.12 ML

 

  SUBCUTANEOUSLY

  AT BEDTIME

   

  Label Comments:

Do NOT refrigerate. * Syringe expires in 24 hours



DO NOT MIX WITH OTHER INSULINS

 

  Insulin Glargine 100 UNT/ML Injectable Solution [Lantus] (RxNorm): 175742

 

LANTUS (INSULIN GLARGINE) NDC: 78402588908

 



 

 PROTONIX(PANTOPRAZOLE SOD) 40 MG TAB

Dose: 40 MG

 

  ORAL

  DAILY@0600

   

   

  pantoprazole 40 MG Delayed Release Oral Tablet [Protonix] (RxNorm): 040839

 

PROTONIX (PANTOPRAZOLE SOD) NDC: 81605474838

 



 

 ZOCOR(SIMVASTATIN) 20 MG TAB

Dose: 20 MG

 

  ORAL

  AT BEDTIME

   

  Label Comments:

**SUBST FOR PITAVASTATIN 2 MG

TERATOGENIC. PREGNANT WOMEN SHOULD NOT HANDLE OR 

CRUSH.

 

  Simvastatin 20 MG Oral Tablet (RxNorm): 871228

 

ZOCOR (SIMVASTATIN) NDC: 21807371681

 



 

 APRESOLINE(HydrALAZINE) 50 MG TAB

Dose: 100 MG

 

  ORAL

  TWICE A DAY

   

  Label Comments:

MAY INCREASE FALL RISK

 

  Hydralazine Hydrochloride 50 MG Oral Tablet (RxNorm): 279468

 

APRESOLINE (HydrALAZINE) NDC: 88829026403

 



 

 COREG CR(CARVEDILOL) 10 MG CAP

Dose: 40 MG

 

  ORAL

  DAILY

   

  Label Comments:

MAY INCREASE FALL RISK

TAKE WITH FOOD

 

  24 HR carvedilol phosphate 10 MG Extended Release Oral Capsule [Coreg] (RxNorm
): 199261

 

COREG CR (CARVEDILOL) NDC: 97284594477

 



 

 NICODERM 21 MG PATCH(NICOTINE) 1 PATCH PATCH

Dose: 1 PATCH

 

  TOPICALLY

  NOW

   

  Label Comments:

WEAR GLOVES FOR HANDLING OR WASH HANDS AFTER 

HANDLING.

 

  24 HR Nicotine 0.875 MG/HR Transdermal System (RxNorm): 697142

 

NICODERM 21 MG PATCH (NICOTINE) NDC: 33605513892

 



 

 APRESOLINE(HydrALAZINE) 50 MG TAB

Dose: 100 MG

 

  ORAL

  3 TIMES A DAY

   

  Label Comments:

MAY INCREASE FALL RISK

 

  Hydralazine Hydrochloride 50 MG Oral Tablet (RxNorm): 101459

 

APRESOLINE (HydrALAZINE) NDC: 96385371429

 



 

 NICODERM 21 MG PATCH(NICOTINE) 1 PATCH PATCH

Dose: 1 PATCH

 

  TOPICALLY

  DAILY

   

  Label Comments:

WEAR GLOVES FOR HANDLING OR WASH HANDS AFTER 

HANDLING.

 

  24 HR Nicotine 0.875 MG/HR Transdermal System (RxNorm): 382233

 

NICODERM 21 MG PATCH (NICOTINE) NDC: 45151683181

 



 

 CIPRODEX OTIC SUSP(CIPROFLOXACIN HCL/DEXAMETH) 7.5 ML SUSPENSION

Dose: 0 ML

 

  LEFT EAR

  TWICE A DAY

   

  Label Comments:

<may be substituted for Cipro HC>

SHAKE WELL

 

Special Dose Instructions:

 4 DROPS

 

  Ciprofloxacin 3 MG/ML / Dexamethasone 1 MG/ML Otic Suspension [Ciprodex] (
RxNorm): 750700

 

CIPRODEX OTIC SUSP (CIPROFLOXACIN HCL/DEXAMETH) NDC: 73574932781

 



 

 IV Medication

 

Additives:

 

EPOGEN(EPOETIN PAUL) 77704 UNIT/2 ML INJECTION

Dose: 5000 UNIT

 

Carriers:

 

SYRINGE/NEEDLES 1 SYR INJECTION

Dose: 1 SYR

 

  SUBCUTANEOUSLY

  MoWeFr@09 (Rate: 0 MLS/HR Duration: 0 SEC) 

 

   

  Label Comments:

To be given by floor nurse.

REFRIGERATE.DO NOT SHAKE.

 

  Additives:

 

Epoetin Paul 42114 UNT/ML Injectable Solution [Epogen] (RxNorm): 375022

 

EPOGEN (EPOETIN PAUL) NDC: 94231047373

 

 



 

 ZESTRIL(LISINOPRIL) 40 MG TAB

Dose: 40 MG

 

  ORAL

  DAILY

   

  Label Comments:

MAY INCREASE FALL RISK

 

  Lisinopril 40 MG Oral Tablet (RxNorm): 480035

 

ZESTRIL (LISINOPRIL) NDC: 20817166091

 



 

 MIRALAX(POLYETHYLENE GLYCOL) 17 GM POWDER

Dose: 17 GM

 

  ORAL

  DAILY

  PRN Reason: CONSTIPATION

  Label Comments:

DISSOLVE IN 8 OZ OF WATER

 

  POLYETHYLENE GLYCOL 3350 18099 MG Powder for Oral Solution [Miralax] (RxNorm)
: 777313

 

MIRALAX (POLYETHYLENE GLYCOL) NDC: 68912692858

 













 Discharge Medications - Medications that patient should continue to take. 
Review with physician 

 

 Visit/Account #[id number] (2017 6:54pm - 2017 6:10pm) 

 

 Medication  Route  Sig/Schedule  Precondition/Indication  Comments/Instructions
  Codes

 

 TYLENOL(ACETAMINOPHEN) 500 MG TABLET

Dose: 500 MG

 

  ORAL

  Q4H

  PAIN OR FEVER

   

  TYLENOL (ACETAMINOPHEN) NDC: 24761685534

 



 

 Aspirin Ec(ASPIRIN) 81 MG TAB

Dose: 81 MG

 

  ORAL

  DAILY

   

   

  Aspirin 81 MG Delayed Release Oral Tablet (RxNorm): 379029

 

Aspirin Ec (ASPIRIN) NDC: 40788214780

 



 

 PROTONIX(PANTOPRAZOLE) 40 MG TAB

Dose: 40 MG

 

  ORAL

  DAILY

   

   

  pantoprazole 40 MG Delayed Release Oral Tablet [Protonix] (RxNorm): 713283

 

PROTONIX (PANTOPRAZOLE) NDC: 49160644592

 



 

 LANTUS PEN(INSULIN GLARGINE) 300 UNIT/3 ML INJECTION

Dose: 12 UNITS

 

  SUBCUTANEOUSLY

  AT BEDTIME

   

   

  3 ML Insulin Glargine 100 UNT/ML Pen Injector [Lantus] (RxNorm): 428472

 

LANTUS PEN (INSULIN GLARGINE) NDC: 59037272926

 



 

 LASIX(FUROSEMIDE) 40 MG TABLET

Dose: 160 MG

 

  ORAL

  DAILY

   

   

  Furosemide 40 MG Oral Tablet [Lasix] (RxNorm): 893166

 

LASIX (FUROSEMIDE) NDC: 16643730566

 



 

 LIVALO(PITAVASTATIN) 2 MG TABLET

Dose: 2 MG

 

  ORAL

  AT BEDTIME

   

   

  pitavastatin 2 MG Oral Tablet [Livalo] (RxNorm): 172844

 

LIVALO (PITAVASTATIN) NDC: 51352622746

 



 

 Coreg Cr(CARVEDILOL PHOSPHATE) 40 MG CPMP.24HR

Dose: 40 MG

 

  ORAL

  DAILY

   

   

  24 HR carvedilol phosphate 40 MG Extended Release Oral Capsule [Coreg] (RxNorm
): 067413

 

Coreg Cr (CARVEDILOL PHOSPHATE) NDC: 45746780764

 



 

 APRESOLINE(HydrALAZINE) 50 MG TAB

Dose: 100 MG

 

  ORAL

  3 TIMES A DAY

   

   

  Hydralazine Hydrochloride 50 MG Oral Tablet (RxNorm): 717698

 

APRESOLINE (HydrALAZINE) NDC: 32438661857

 



 

 Prinivil(LISINOPRIL) 40 MG TABLET

Dose: 40 MG

 

  ORAL

  DAILY

   

   

  Lisinopril 40 MG Oral Tablet (RxNorm): 570616

 

Prinivil (LISINOPRIL) NDC: 07600833880

 









History Of Encounters







 Encounters 

 

 Visit/Account #[id number] (2017 6:54pm - 2017 6:10pm) 

 

 Account Status  Physican Of Record  Reason For Visit  Visit Diagnosis  Start 
Date/Time  Stop Date/Time

 

 ER

  CORINNA NASSAR MD

  END STAGE RENAL DISEASE

  N17.9: ACUTE KIDNEY FAILURE, UNSPECIFIED ICD10

  Aug 11, 2017 6:54pm

  Aug 11, 2017 10:34pm



 

 [place]

  CORINNA NASSAR MD

  END STAGE RENAL DISEASE

  N17.9: ACUTE KIDNEY FAILURE, UNSPECIFIED ICD10

  Aug 15, 2017 11:56am

  Aug 16, 2017 6:10pm



 

 IN

  CORINNA NASSAR MD

  END STAGE RENAL DISEASE

  N17.9: ACUTE KIDNEY FAILURE, UNSPECIFIED ICD10

  Aug 15, 2017 11:56am

  Aug 16, 2017 6:10pm









History of Procedures







 Procedure List 

 

 Visit/Account #[id number] (2017 6:54pm - 2017 6:10pm) 

 

 Code/Procedure  Date

 

 [id number]: PERFORMANCE OF URINARY FILTRATION, MULTIPLE

  2017









Discharge Instructions







 Discharge Instructions

 

 Visit/Account #[id number] (2017 6:54pm - 2017 6:10pm)

 

 DISCHARGE INSTRUCTIONS Physician Documentation

 

PROVIDER INSTRUCTIONS

 

Discharge Diet Renal

 

Discharge Activity/Weight Bearing Status As tolerated

 

Other Discharge Instructions -

 

Discharge Diet Renal

 

Discharge Activity/Weight Bearing Status As tolerated

 

Other Discharge Instructions -

 

CARE MANAGEMENT/HOME HEALTH

 

[facility]

Home Health for skilled nursing (med set up), PT and OT (blindness) will

be resumed through [facility].

 

REASON TO CALL PROVIDER

 

 

Notify Physician if:

 

You have the following: - temperature greater than 101.5 degrees Fahrenheit - 
uncontrolled pain

 - persistent nausea/vomiting - any questions or concerns

 

FOLLOW UP APPOINTMENTS

 

 

Follow Up Appointment Date/Time: [facility] [phone]

 -María Aguilar on Monday, 2017 at 9:00am. You will also

have a financial screening appointment after meeting with María. Please 
bring the application.

 -Dr. Pallavi Adam on 2017 at 9:50am

Outpatient Dialysis at [facility] ([address]) every Monday, Wednesday,

Friday at 11:10am. On your first treatment day you will need to be there

at 10:30am. [facility] will provide transportation. Please continue to coordinate 
with María Aguilar.

 









Social History







 Social History 









 No Social History Data.









Immunizations







 Immunizations

 

 Patient Unit Number: [id number]

 

 Immunizations

 

 No immunizations recorded.

## 2019-03-03 NOTE — XMS REPORT
[facility], [facility].

 Created on: 2016



Ingrid Riojas

External Reference #: [id number]

: 1960

Sex: Female



Demographics







 Address  [address] [place]  [address]

 

 Home Phone  [phone]

 

 Preferred Language  Unknown

 

 Marital Status  Unknown

 

 Yazidism Affiliation  Unknown

 

 Race  White

 

 Ethnic Group  Not  or 





Author







 Kimi Horner

 

 [facility]  eClinicalWorks

 

 Address  Unknown

 

 Phone  Unavailable







Care Team Providers







 Care Team Member Name  Role  Phone

 

 Kimi Corrales    Unavailable



                                                                



Allergies

          No Known Allergies                                                   
                                     



Problems

          





 Problem Type  Condition  Code  Onset Dates  Condition Status

 

 Problem  Diabetes Mellitus Type 2, not stated as uncontrolled  250.00     
Active

 

 Problem  Hypertension, benign  401.1     Active

 

 Problem  Hypertensive retinopathy  362.11     Active



                                                                               
                             



Medications

          No Known Medications                                                 
                             



Results

          No Known Results                                                     
               



Summary Purpose

          eClinicalWorks Submission

## 2019-03-03 NOTE — XMS REPORT
[facility], [facility].

 Created on: 2015



Ingrid Riojas

External Reference #: [id number]

: 1960

Sex: Female



Demographics







 Address  [address] [place]  [address]

 

 Home Phone  [phone]

 

 Preferred Language  Unknown

 

 Marital Status  Unknown

 

 Restorationism Affiliation  Unknown

 

 Race  White

 

 Ethnic Group  Not  or 





Author







 Author  Kimi Corrales

 

 [facility]  eClinicalWorks

 

 Address  Unknown

 

 Phone  Unavailable







Care Team Providers







 Care Team Member Name  Role  Phone

 

 Kimi Corrales    Unavailable



                                                                



Allergies

          No Known Allergies                                                   
                                     



Problems

          





 Problem Type  Condition  ICD-9 Code  Onset Dates  Condition Status

 

 Problem  Diabetes Mellitus Type 2, not stated as uncontrolled  250.00     
Active

 

 Problem  Hypertension, benign  401.1     Active

 

 Problem  Hypertensive retinopathy  362.11     Active



                                                                               
                             



Medications

          No Known Medications                                                 
                             



Results

          No Known Results                                                     
               



Summary Purpose

          eClinicalWorks Submission

## 2019-03-03 NOTE — XMS REPORT
[facility] [[email or web address]], [facility].

 Created on: 2014



Ingrid Riojas

External Reference #: [id number]

: 1960

Sex: Female



Demographics







 Address  [address] [place]  [address]

 

 Home Phone  [phone]

 

 Preferred Language  Unknown

 

 Marital Status  Unknown

 

 Moravian Affiliation  Unknown

 

 Race  White

 

 Ethnic Group  Not  or 





Author







 Author  Kimi Corrales

 

 [facility]  eClinicalWorks

 

 Address  Unknown

 

 Phone  Unavailable







Care Team Providers







 Care Team Member Name  Role  Phone

 

 Kimi Corrales    Unavailable



                                                                



Allergies

          No Known Allergies                                                   
                                     



Problems

          





 Problem Type  Condition  ICD-9 Code  Onset Dates  Condition Status

 

 Problem  Hypertension, benign  401.1     Active

 

 Problem  Diabetes Mellitus Type 2, not stated as uncontrolled  250.00     
Active



                                                                               
                   



Medications

          No Known Medications                                                 
                                                 



Vital Signs

          





 Date/Time:  2014

 

 Height  66.5 inches

 

 Weight  158.8 lbs

 

 Temperature  98.3 F

 

 Blood Pressure Diastolic  72 mm Hg

 

 Blood Pressure Systolic  160 mm Hg

 

 Cardiac Monitoring Heart Rate  112 Beats per Minute

 

 BMI  25.24 Index

 

 Respiratory Rate  16 per Minute



                                                                              



Results

          No Known Results                                                     
               



Summary Purpose

          eClinicalWorks Submission

## 2019-03-03 NOTE — DIAGNOSTIC IMAGING REPORT
INDICATION: Cough and congestion



COMPARISON: None



FINDINGS: Single frontal view of the chest demonstrates normal

heart size and pulmonary vascularity. The lungs are well aerated

and clear. No large pleural effusion or pneumothorax is seen. The

visualized osseous structures show no acute abnormalities. Right

subclavian stents are noted.



IMPRESSION: 

1. No acute cardiopulmonary process.  



Dictated by: 



  Dictated on workstation # [id number]

## 2019-03-03 NOTE — XMS REPORT
[facility], [facility].

 Created on: 2014



Ingrid Riojas

External Reference #: [id number]

: 1960

Sex: Female



Demographics







 Address  [address] [place]  [address]

 

 Home Phone  [phone]

 

 Preferred Language  Unknown

 

 Marital Status  Unknown

 

 Buddhist Affiliation  Unknown

 

 Race  White

 

 Ethnic Group  Not  or 





Author







 Author  Kimi Corrales

 

 [facility]  eClinicalWorks

 

 Address  Unknown

 

 Phone  Unavailable







Care Team Providers







 Care Team Member Name  Role  Phone

 

 Kimi Corrales    Unavailable



                                                                



Allergies

          No Known Allergies                                                   
                                     



Problems

          





 Problem Type  Condition  ICD-9 Code  Onset Dates  Condition Status

 

 Problem  Hypertension, benign  401.1     Active

 

 Problem  Diabetes Mellitus Type 2, not stated as uncontrolled  250.00     
Active



                                                                               
                   



Medications

          No Known Medications                                                 
                                                 



Vital Signs

          





 Date/Time:  2014

 

 Height  66.5 inches

 

 Weight  158.8 lbs

 

 Temperature  98.3 F

 

 Blood Pressure Diastolic  72 mm Hg

 

 Blood Pressure Systolic  160 mm Hg

 

 Cardiac Monitoring Heart Rate  112 Beats per Minute

 

 BMI  25.24 Index

 

 Respiratory Rate  16 per Minute



                                                                              



Results

          No Known Results                                                     
               



Summary Purpose

          eClinicalWorks Submission

## 2019-03-03 NOTE — ED RESPIRATORY
General


Stated Complaint:  VOMITING,DID NOT DO DIALYSIS YESTERDAY


Source:  patient, EMS


Exam Limitations:  no limitations





History of Present Illness


Date Seen by Provider:  Mar 3, 2019


Time Seen by Provider:  00:16


Initial Comments


The patient presents to the ER by EMS with chief complaint of shortness of 

breath, cough, fever. She had a temperature 101 per EMS and was given a DuoNeb 

on route which she said helped some. EMS reports she was quite anxious and 

making some grunting sounds when they picked her up. She is at end-stage renal 

disease on dialysis Monday Wednesday Friday. Her last dialysis was on Wednesday 

and then she moved from [place] to [place] and was told since her numbers 

were good she could skip Friday. She does not notice any increased edema or 

swelling in her hands or feet or increased weight gain. Her cough is only 

modestly productive at times. She says she's not had any of her medicines today 

because she's been moving. She has not taken any insulin and EMS reports blood 

sugar was 177.


She also has a history of high blood pressure, congestive heart failure.





Allergies and Home Medications


Allergies


Coded Allergies:  


     Penicillins (Verified  Adverse Reaction, Unknown, rash, 3/3/19)





Patient Home Medication List


Home Medication List Reviewed:  Yes





Review of Systems


Review of Systems


Constitutional:  chills, fever, malaise, weakness


EENTM:  No ear discharge, No ear pain


Respiratory:  No cough, No phlegm


Cardiovascular:  No chest pain, No palpitations


Gastrointestinal:  No abdominal pain, No constipation, No diarrhea; nausea, 

vomiting


Genitourinary:  No discharge, No dysuria


Musculoskeletal:  No back pain, No joint pain


Skin:  No pruritus, No rash





Past Medical-Social-Family Hx


Patient Social History


Alcohol Use:  Denies Use


Recreational Drug Use:  No


Smoking Status:  Former Smoker


Former Smoker, Quit:  Dec 15, 2018





Physical Exam





Vital Signs - First Documented








 3/3/19 3/3/19





 00:25 01:10


 


Temp 100.6 


 


Pulse 104 


 


Resp 24 


 


B/P (MAP) 206/89 (128) 


 


Pulse Ox 95 


 


O2 Delivery Room Air 


 


O2 Flow Rate  2.00





Capillary Refill :


Height: '"


Weight: lbs. oz. kg;  BMI


Method:


General Appearance:  mild distress, other (chronically ill)


Eyes:  Bilateral Eye Normal Inspection, Bilateral Eye PERRL, Bilateral Eye EOMI


HEENT:  PERRL/EOMI, normal ENT inspection, TMs normal, pharynx normal


Neck:  non-tender, full range of motion, supple, normal inspection


Respiratory:  chest non-tender, respiratory distress (mild), decreased breath 

sounds, accessory muscle use (mild); No crackles, No wheezing


Cardiovascular:  normal peripheral pulses, regular rate, rhythm, no edema


Gastrointestinal:  normal bowel sounds, non tender, soft


Neurologic/Psychiatric:  alert; No normal mood/affect (mild anxiety); oriented 

x 3


Skin:  normal color, warm/dry





Focused Exam


Lactate Level


3/3/19 00:28: Lactic Acid Level 0.88





Lactic Acid Level





Laboratory Tests








Test


 3/3/19


00:28


 


Lactic Acid Level


 0.88 MMOL/L


(0.50-2.00)











Progress/Results/Core Measures


Suspected Sepsis


SIRS


Temperature: 


Pulse:  


Respiratory Rate: 


 


Laboratory Tests


3/3/19 00:28: White Blood Count 10.7


Blood Pressure  / 


Mean: 


 





3/3/19 00:28: Lactic Acid Level 0.88


Laboratory Tests


3/3/19 00:28: 


Creatinine 8.02H, INR Comment 1.1, Platelet Count 156, Total Bilirubin 0.6








Results/Orders


Lab Results





Laboratory Tests








Test


 3/3/19


00:28 3/3/19


00:30 3/3/19


00:39 Range/Units


 


 


White Blood Count


 10.7 


 


 


 4.3-11.0


10^3/uL


 


Red Blood Count


 3.56 L


 


 


 4.35-5.85


10^6/uL


 


Hemoglobin 10.9 L   11.5-16.0  G/DL


 


Hematocrit 33 L   35-52  %


 


Mean Corpuscular Volume 92    80-99  FL


 


Mean Corpuscular Hemoglobin 31    25-34  PG


 


Mean Corpuscular Hemoglobin


Concent 33 


 


 


 32-36  G/DL





 


Red Cell Distribution Width 14.9 H   10.0-14.5  %


 


Platelet Count


 156 


 


 


 130-400


10^3/uL


 


Mean Platelet Volume 11.2 H   7.4-10.4  FL


 


Neutrophils (%) (Auto) 85 H   42-75  %


 


Lymphocytes (%) (Auto) 7 L   12-44  %


 


Monocytes (%) (Auto) 8    0-12  %


 


Eosinophils (%) (Auto) 1    0-10  %


 


Basophils (%) (Auto) 0    0-10  %


 


Neutrophils # (Auto) 9.1 H   1.8-7.8  X 10^3


 


Lymphocytes # (Auto) 0.7 L   1.0-4.0  X 10^3


 


Monocytes # (Auto) 0.8    0.0-1.0  X 10^3


 


Eosinophils # (Auto)


 0.1 


 


 


 0.0-0.3


10^3/uL


 


Basophils # (Auto)


 0.0 


 


 


 0.0-0.1


10^3/uL


 


Neutrophils % (Manual) 88     %


 


Lymphocytes % (Manual) 4     %


 


Monocytes % (Manual) 5     %


 


Eosinophils % (Manual) 3     %


 


Prothrombin Time 14.7    12.2-14.7  SEC


 


INR Comment 1.1    0.8-1.4  


 


Activated Partial


Thromboplast Time 30 


 


 


 24-35  SEC





 


Sodium Level 141    135-145  MMOL/L


 


Potassium Level 3.7    3.6-5.0  MMOL/L


 


Chloride Level 99      MMOL/L


 


Carbon Dioxide Level 24    21-32  MMOL/L


 


Anion Gap 18 H   5-14  MMOL/L


 


Blood Urea Nitrogen 41 H   7-18  MG/DL


 


Creatinine


 8.02 H


 


 


 0.60-1.30


MG/DL


 


Estimat Glomerular Filtration


Rate 5 


 


 


  





 


BUN/Creatinine Ratio 5     


 


Glucose Level 160 H     MG/DL


 


Lactic Acid Level


 0.88 


 


 


 0.50-2.00


MMOL/L


 


Calcium Level 10.0    8.5-10.1  MG/DL


 


Corrected Calcium 9.7    8.5-10.1  MG/DL


 


Total Bilirubin 0.6    0.1-1.0  MG/DL


 


Aspartate Amino Transf


(AST/SGOT) 13 


 


 


 5-34  U/L





 


Alanine Aminotransferase


(ALT/SGPT) 6 


 


 


 0-55  U/L





 


Alkaline Phosphatase 81      U/L


 


Troponin I < 0.028    <0.028  NG/ML


 


Total Protein 8.1    6.4-8.2  GM/DL


 


Albumin 4.4    3.2-4.5  GM/DL


 


Group A Streptococcus Screen  NEGATIVE   NEGATIVE  


 


Blood Gas Puncture Site   LEFT RADIAL   


 


Blood Gas Patient Temperature   100.6   


 


Arterial Blood pH   7.40  7.37-7.43  


 


Arterial Blood Partial


Pressure CO2 


 


 45 


 35-45  MMHG





 


Arterial Blood Partial


Pressure O2 


 


 71 L


 79-93  MMHG





 


Arterial Blood HCO3   27  23-27  MMOL/L


 


Arterial Blood Total CO2


 


 


 28.3 


 21.0-31.0


MMOL/L


 


Arterial Blood Oxygen


Saturation 


 


 95 


   %





 


Arterial Blood Base Excess


 


 


 2.9 H


 -2.5-2.5


MMOL/L


 


Bryce Test   POSITIVE   


 


Blood Gas Ventilator Setting   NO   


 


Blood Gas Inspired Oxygen   N   








Micro Results





Microbiology


3/3/19 Influenza Types A,B Antigen (OX[facility]) - Final, Complete


         





My Orders





Orders - MARILYN HOLLOWAY


Cbc With Automated Diff (3/3/19 00:27)


Comprehensive Metabolic Panel (3/3/19 00:27)


Blood Culture (3/3/19 00:27)


Sputum Culture (3/3/19 00:27)


Urinalysis (3/3/19 00:27)


Urine Culture (3/3/19 00:27)


Protime With Inr (3/3/19 00:27)


Partial Thromboplastin Time (3/3/19 00:27)


Chest 1 View, Ap/Pa Only (3/3/19 00:27)


Saline Lock/Iv-Start (3/3/19 00:27)


Saline Lock/Iv-Start (3/3/19 00:27)


Ekg Tracing (3/3/19 00:27)


Troponin I (3/3/19 00:27)


Vital Signs Adult Sepsis Patie Q15M (3/3/19 00:27)


Ondansetron Injection (Zofran Injectio (3/3/19 00:30)


O2 (3/3/19 00:27)


Remove Rings In Anticipation O (3/3/19 00:27)


Lactic Acid Analyzer (3/3/19 00:27)


Influenza A And B Antigens (3/3/19 00:27)


Cefepime Injection (Maxipime Injection) (3/3/19 00:30)


Arterial Blood Gas (3/3/19 00:27)


Rapid Strep A Screen (3/3/19 00:27)


Acetaminophen  Tablet (Tylenol  Tablet) (3/3/19 00:45)


Albuterol/Ipra Inhalation Soln (Duoneb I (3/3/19 00:45)


Svn Small Volume Nebulizer (3/3/19 00:43)


Manual Differential (3/3/19 00:28)


Metoprolol Tartrate Injection (Lopressor (3/3/19 01:15)


Nitroglycerin Ointment (Nitrobid Ointme (3/3/19 01:45)


Nitroglycerin Ointment (Nitrobid Ointme (3/3/19 01:32)


Arterial Blood Draw (3/2/19 )





Medications Given in ED





Current Medications








 Medications  Dose


 Ordered  Sig/Jenelle


 Route  Start Time


 Stop Time Status Last Admin


Dose Admin


 


 Acetaminophen  1,000 mg  ONCE  ONCE


 PO  3/3/19 00:45


 3/3/19 00:46 DC 3/3/19 00:54


1,000 MG


 


 Albuterol/


 Ipratropium  3 ml  ONCE  ONCE


 INH  3/3/19 00:45


 3/3/19 00:46 DC 3/3/19 00:50


3 ML


 


 Cefepime HCl 1000


 mg/Sterile Water  10 ml @ 


 200 mls/hr  ONCE  ONCE


 IV  3/3/19 00:30


 3/3/19 00:32 DC 3/3/19 00:56


200 MLS/HR


 


 Nitroglycerin  1 inch  ONCE  ONCE


 TOP  3/3/19 01:45


 3/3/19 01:46 DC 3/3/19 01:36


1 INCH


 


 Ondansetron HCl  4 mg  PRN  PRN


 IV  3/3/19 00:30


 3/3/19 00:56 DC 3/3/19 00:55


4 MG








Vital Signs/I&O











 3/3/19 3/3/19 3/3/19 3/3/19





 00:25 00:50 00:54 01:10


 


Temp 100.6  100.6 


 


Pulse 104   


 


Resp 24   


 


B/P (MAP) 206/89 (128)   


 


Pulse Ox 95 96  97


 


O2 Delivery Room Air Room Air  Nasal Cannula


 


O2 Flow Rate    2.00


 


    





 3/3/19   





 03:21   


 


Temp 100.3   


 


Pulse 95   


 


Resp 22   


 


B/P (MAP) 178/73 (108)   


 


Pulse Ox 100   


 


O2 Delivery Nasal Cannula   


 


O2 Flow Rate 2.00   





Capillary Refill :


Progress Note #1:  


   Time:  00:46


Progress Note


Tylenol for her fever. Influenza swab, septic workup. Lactate will be largely 

meaningless. She does not have much pedal edema. Her blood pressures quite up 

and she skipped a dialysis treatment so I would be concerned about fluid 

overload and be cautious about giving her more IV fluids right now. Her blood 

pressure remains up and she looks like she is in congestive heart failure then 

we may consider giving her some nitroglycerin paste. She's not having any chest 

pain right now just shortness of breath. Lasix will not be very helpful so we'

ll send her somewhere for dialysis. Lungs are diminished but there is no 

wheezing. We'll give a DuoNeb and see if that improves her respiratory status. 

Her oxygenation is in the high 90s on room air. She received a DuoNeb on route 

which she said made some improvement in her breathing. She quit smoking 3 

months ago. We'll obtain an EKG. A troponin will largely be useless.


Progress Note #2:  


   Time:  01:30


Progress Note


Her blood pressure significantly elevated at 215/94 now. It stayed elevated. We 

plan to give her her own dose of losartan and some Nitropaste. We'll hold the 

metoprolol for now.


She has some modest hypoxia with a PaO2 of 71 and nonproductive cough and 

fever. We'll call it pneumonia versus bronchitis for now. She is going to need 

dialysis before Monday probably and further evaluation. She may also benefit 

from fluid removal by dialysis.





ECG


Initial ECG Impression Date:  Mar 3, 2019


Initial ECG Impression Time:  00:53


Initial ECG Rate:  96


Initial ECG Rhythm:  Normal Sinus


Initial ECG Intervals:  Normal


Initial ECG Impression:  Normal


Initial ECG Comparisson:  No Previous ECG Available


Comment


No ST elevation or depression.





Diagnostic Imaging





   Diagonstic Imaging:  Xray


   Plain Films/CT/US/NM/MRI:  chest (1v)


Comments


No acute cardiopulmonary processes noted. There is a stone graft/splint 

overlying the right subclavian.


   Reviewed:  Reviewed by Me





Departure


Impression





 Primary Impression:  


 Pneumonia


 Qualified Codes:  J18.9 - Pneumonia, unspecified organism


 Additional Impressions:  


 Hypoxia


 End stage renal disease on dialysis due to type 2 diabetes mellitus


Disposition:  02 XFER SHT-TRM HOSP


Condition:  Stable





Transfer


Time Spoke to Accepting Phy:  02:00


Transfer Progress Notes


Discussed case with triage and they will contact Dr. Ace. 


Discussed the case with Dr. Ace and he agrees to accept the patient to the 

floor. They will call us back with a bed number.


Transfer Time:  03:10


Transfer Facility:  


[place], [place].


Method of Transfer:  EMS











MARILYN HOLLOWAY Mar 3, 2019 00:27

## 2019-03-03 NOTE — XMS REPORT
[facility], [facility].

 Created on: 2015



Ingrid Riojas

External Reference #: [id number]

: 1960

Sex: Female



Demographics







 Address  [address] [place]  [address]

 

 Home Phone  [phone]

 

 Preferred Language  Unknown

 

 Marital Status  Unknown

 

 Mormon Affiliation  Unknown

 

 Race  White

 

 Ethnic Group  Not  or 





Author







 Kimi Horner

 

 [facility]  [employer]

 

 Address  Unknown

 

 Phone  Unavailable







Care Team Providers







 Care Team Member Name  Role  Phone

 

 Kimi Corrales    Unavailable



                                                                



Allergies

          No Known Allergies                                                   
                                     



Problems

          





 Problem Type  Condition  Code  Onset Dates  Condition Status

 

 Problem  Diabetes Mellitus Type 2, not stated as uncontrolled  250.00     
Active

 

 Problem  Hypertension, benign  401.1     Active

 

 Problem  Hypertensive retinopathy  362.11     Active



                                                                               
                             



Medications

          No Known Medications                                                 
                             



Results

          No Known Results                                                     
               



Summary Purpose

          eClinical[employer] Submission

## 2019-03-03 NOTE — XMS REPORT
[facility], [facility].

 Created on: 12/10/2014



Ingrid Riojas

External Reference #: [id number]

: 1960

Sex: Female



Demographics







 Address  [address] [place]  [address]

 

 Home Phone  [phone]

 

 Preferred Language  Unknown

 

 Marital Status  Unknown

 

 Latter day Affiliation  Unknown

 

 Race  White

 

 Ethnic Group  Not  or 





Author







 Author  Kimi Corrales

 

 [facility]  [employer]

 

 Address  Unknown

 

 Phone  Unavailable







Care Team Providers







 Care Team Member Name  Role  Phone

 

 Kimi Corrales    Unavailable



                                                                



Allergies

          No Known Allergies                                                   
                                     



Problems

          





 Problem Type  Condition  ICD-9 Code  Onset Dates  Condition Status

 

 Problem  Hypertension, benign  401.1     Active

 

 Problem  Diabetes Mellitus Type 2, not stated as uncontrolled  250.00     
Active



                                                                               
                   



Medications

          No Known Medications                                                 
                                                 



Vital Signs

          





 Date/Time:  2014

 

 Height  66.5 inches

 

 Weight  158.8 lbs

 

 Temperature  98.3 F

 

 Blood Pressure Diastolic  72 mm Hg

 

 Blood Pressure Systolic  160 mm Hg

 

 Cardiac Monitoring Heart Rate  112 Beats per Minute

 

 BMI  25.24 Index

 

 Respiratory Rate  16 per Minute



                                                                              



Results

          No Known Results                                                     
               



Summary Purpose

          eClinicalWorks Submission

## 2019-03-03 NOTE — XMS REPORT
[facility], [facility].

 Created on: 2015



Ingrid Riojas

External Reference #: [id number]

: 1960

Sex: Female



Demographics







 Address  [address] [place]  [address]

 

 Home Phone  [phone]

 

 Preferred Language  Unknown

 

 Marital Status  Unknown

 

 Yazidism Affiliation  Unknown

 

 Race  White

 

 Ethnic Group  Not  or 





Author







 Author  Kimi Corrales

 

 [facility]  [employer]

 

 Address  Unknown

 

 Phone  Unavailable







Care Team Providers







 Care Team Member Name  Role  Phone

 

 Kimi Corrales    Unavailable



                                                                



Allergies

          No Known Allergies                                                   
                                     



Problems

          





 Problem Type  Condition  ICD-9 Code  Onset Dates  Condition Status

 

 Problem  Diabetes Mellitus Type 2, not stated as uncontrolled  250.00     
Active

 

 Problem  Hypertension, benign  401.1     Active

 

 Problem  Hypertensive retinopathy  362.11     Active



                                                                               
                             



Medications

          





 Medication  Code System  Code  Instructions  Start Date  End Date  Status  
Dosage

 

 Reglan  NDC  [address]-10  10 MG Orally Twice every day as needed  2015        1 tablet 30 minutes before meals and at bedtime as needed

 

 Lisinopril-Hydrochlorothiazide  NDC  [address]-82  20-25 MG Orally Once a day 
*Needs appointment before next refill*  Dec 17, 2014        1 tablet



                                                                               
         



Results

          No Known Results                                                     
               



Summary Purpose

          eClinicalWorks Submission

## 2019-03-03 NOTE — XMS REPORT
[facility] Continuity Of Care Document

 Created on: 03/15/2017



KIMBERLY KINCAID

External Reference #: [id number]

: 1960

Sex: Female



Demographics







 Address  [address]  [address]

 

 Home Phone  [phone]

 

 Preferred Language  Unknown

 

 Marital Status  Never 

 

 [employer] Affiliation  Unknown

 

 Race  White

 

 Ethnic Group  Unknown





Author







 Author  [facility]

 

 Organization  [facility]

 

 Address  [address] [place]  [address]



 

 Phone  [phone]







Support







 Name  Relationship  Address  Phone

 

 OLIVIA KINCAID  Next Of Kin  [address]  [address]  [phone]

 

 OLIVIA KINCAID  ECON  [address]  [address]  [phone]







Care Team Providers







 Care Team Member Name  Role  Phone

 

 NIGEL OBRIEN, MARKELL FERMIN  CP  [phone]

 

 JENN OBRIEN, SOPHIA MISHRA  CP  [phone]

 

 NICA OBRIEN, KIKA JARA  Unavailable  [phone]

 

 JOSE OBRIEN, HERMINIA RODRIGUEZ  AT  [phone]

 

 PALLAVI STEPHENS  PP  [phone]







Results







 Lab Results 

 

 Visit/Account #[id number] (2017 5:30pm - March 15, 2017 12:23pm) 

 

 Test  Result  Date/Time

 

 POCGL 

 

 POCGL( MG/DL)

  199 MG/DL

  2017 9:21pm











 121 MG/DL

  2017 6:12am



 

 118 MG/DL

  2017 11:39am



 

 125 MG/DL

  2017 4:54pm



 

 152 MG/DL

  2017 8:59pm



 

 74 MG/DL

  2017 5:54am



 

 89 MG/DL

  2017 11:33am



 

 111 MG/DL

  2017 5:26pm



 

 126 MG/DL

  2017 9:47pm



 

 72 MG/DL

  2017 5:48am



 

 161 MG/DL

  2017 11:13am



 

 180 MG/DL

  2017 5:09pm



 

 216 MG/DL

  2017 8:46pm



 

 83 MG/DL

  March 15, 2017 5:19am



 

 124 MG/DL

  March 15, 2017 11:40am











 67820-7: HEMOGLOBIN AND HEMATOCRIT 

 

 HEMOGLOBIN(12.0-16.0 G/DL)

  10.3 G/DL

  2017 8:41pm











 8.9 G/DL

  2017 1:15am



 

 8.3 G/DL

  2017 11:49am



 

 8.3 G/DL

  2017 6:01pm



 

 8.1 G/DL

  2017 12:09am



 

 8.1 G/DL

  2017 5:56am



 

 8.5 G/DL

  2017 3:14pm











 HEMATOCRIT(36.0-46.0 %)

  31.9 %

  2017 8:41pm











 27.0 %

  2017 1:15am



 

 25.1 %

  2017 11:49am



 

 25.5 %

  2017 6:01pm



 

 25.4 %

  2017 12:09am



 

 24.3 %

  2017 5:56am



 

 25.8 %

  2017 3:14pm











 44017-3: COMPLETE BLOOD COUNT WITH DIFF 

 

 WHITE BLOOD COUNT(4.0-11.0 10E3/UL)

  16.0 10E3/UL

  2017 5:40pm











 14.4 10E3/UL

  2017 6:13am



 

 9.8 10E3/UL

  2017 5:52am



 

 7.1 10E3/UL

  2017 5:37am



 

 6.6 10E3/UL

  March 15, 2017 8:07am











 RED BLOOD COUNT(4.00-5.20 10E6/UL)

  3.99 10E6/UL

  2017 5:40pm











 2.94 10E6/UL

  2017 6:13am



 

 2.76 10E6/UL

  2017 5:52am



 

 2.75 10E6/UL

  2017 5:37am



 

 2.88 10E6/UL

  March 15, 2017 8:07am











 HEMOGLOBIN(12.0-16.0 G/DL)

  11.7 G/DL

  2017 5:40pm











 8.7 G/DL

  2017 6:13am



 

 8.2 G/DL

  2017 5:52am



 

 8.3 G/DL

  2017 5:37am



 

 8.5 G/DL

  March 15, 2017 8:07am











 HEMATOCRIT(36.0-46.0 %)

  36.2 %

  2017 5:40pm











 26.9 %

  2017 6:13am



 

 25.3 %

  2017 5:52am



 

 24.7 %

  2017 5:37am



 

 25.2 %

  March 15, 2017 8:07am











 66059-5: MEAN CORPUSCULAR VOLUME(82.0-100.0 FL)

  90.7 FL

  2017 5:40pm











 91.5 FL

  2017 6:13am



 

 91.7 FL

  2017 5:52am



 

 89.8 FL

  2017 5:37am



 

 87.5 FL

  March 15, 2017 8:07am











 76768-8: MEAN CORPUSCULAR HEMOGLOBIN(26.0-34.0 PG)

  29.3 PG

  2017 5:40pm











 29.6 PG

  2017 6:13am



 

 29.7 PG

  2017 5:52am



 

 30.2 PG

  2017 5:37am



 

 29.5 PG

  March 15, 2017 8:07am











 MEAN CORPUSCULAR HGB CONC(31.5-36.5 G/DL)

  32.3 G/DL

  2017 5:40pm











 32.3 G/DL

  2017 6:13am



 

 32.4 G/DL

  2017 5:52am



 

 33.6 G/DL

  2017 5:37am



 

 33.7 G/DL

  March 15, 2017 8:07am











 RED CELL DISTRIBUTION WIDTH(11.5-14.5 %)

  12.3 %

  2017 5:40pm











 12.4 %

  2017 6:13am



 

 12.1 %

  2017 5:52am



 

 11.9 %

  2017 5:37am



 

 11.9 %

  March 15, 2017 8:07am











 777-3: PLATELET COUNT(150-450 10E3/UL)

  258 10E3/UL

  2017 5:40pm











 189 10E3/UL

  2017 6:13am



 

 157 10E3/UL

  2017 5:52am



 

 145 10E3/UL

  2017 5:37am



 

 148 10E3/UL

  March 15, 2017 8:07am











 MEAN PLATELET VOLUME(8.2-12.4 FL)

  11.2 FL

  2017 5:40pm











 11.6 FL

  2017 6:13am



 

 11.6 FL

  2017 5:52am



 

 11.3 FL

  2017 5:37am



 

 10.5 FL

  March 15, 2017 8:07am











 770-8: NEUTROPHILS % (AUTO)(40-70 %)

  82 %

  2017 5:40pm











 77 %

  2017 6:13am



 

 63 %

  2017 5:52am



 

 60 %

  2017 5:37am



 

 49 %

  March 15, 2017 8:07am











 LYMPHOCYTES % (AUTO)(15-45 %)

  12 %

  2017 5:40pm











 15 %

  2017 6:13am



 

 28 %

  2017 5:52am



 

 28 %

  2017 5:37am



 

 38 %

  March 15, 2017 8:07am











 5905-5: MONOCYTES % (AUTO)(2-10 %)

  5 %

  2017 5:40pm











 7 %

  2017 6:13am



 

 7 %

  2017 5:52am



 

 9 %

  2017 5:37am



 

 9 %

  March 15, 2017 8:07am











 713-8: EOSINOPHILS % (AUTO)(0-6 %)

  0 %

  2017 5:40pm











 0 %

  2017 6:13am



 

 1 %

  2017 5:52am



 

 2 %

  2017 5:37am



 

 4 %

  March 15, 2017 8:07am











 706-2: BASOPHILS % (AUTO)(0-1 %)

  0 %

  2017 5:40pm











 0 %

  2017 6:13am



 

 0 %

  2017 5:52am



 

 0 %

  2017 5:37am



 

 1 %

  March 15, 2017 8:07am











 10578-5: IMMATURE GRANS % (AUTO)(0-0 %)

  1 %

  2017 5:40pm











 1 %

  2017 6:13am



 

 0 %

  2017 5:52am



 

 0 %

  2017 5:37am



 

 0 %

  March 15, 2017 8:07am











 NUCLEATED RBCS (AUTO)(0-0 %)

  0 %

  2017 5:40pm











 0 %

  2017 6:13am



 

 0 %

  2017 5:52am



 

 0 %

  2017 5:37am



 

 0 %

  March 15, 2017 8:07am











 751-8: NEUTROPHILS # (AUTO)(2.5-7.5 10E3/UL)

  13.2 10E3/UL

  2017 5:40pm











 11.1 10E3/UL

  2017 6:13am



 

 6.2 10E3/UL

  2017 5:52am



 

 4.3 10E3/UL

  2017 5:37am



 

 3.2 10E3/UL

  March 15, 2017 8:07am











 21705-9: LYMPHOCYTES # (AUTO)(1.0-4.0 10E3/UL)

  1.9 10E3/UL

  2017 5:40pm











 2.2 10E3/UL

  2017 6:13am



 

 2.8 10E3/UL

  2017 5:52am



 

 2.0 10E3/UL

  2017 5:37am



 

 2.5 10E3/UL

  March 15, 2017 8:07am











 742-7: MONOCYTES # (AUTO)(0.2-0.8 10E3/UL)

  0.8 10E3/UL

  2017 5:40pm











 1.0 10E3/UL

  2017 6:13am



 

 0.7 10E3/UL

  2017 5:52am



 

 0.6 10E3/UL

  2017 5:37am



 

 0.6 10E3/UL

  March 15, 2017 8:07am











 711-2: EOSINOPHILS # (AUTO)(0.0-0.4 10E3/UL)

  0.0 10E3/UL

  2017 5:40pm











 0.0 10E3/UL

  2017 6:13am



 

 0.1 10E3/UL

  2017 5:52am



 

 0.1 10E3/UL

  2017 5:37am



 

 0.2 10E3/UL

  March 15, 2017 8:07am











 704-7: BASOPHILS # (AUTO)(0.0-0.2 10E3/UL)

  0.0 10E3/UL

  2017 5:40pm











 0.0 10E3/UL

  2017 6:13am



 

 0.0 10E3/UL

  2017 5:52am



 

 0.0 10E3/UL

  2017 5:37am



 

 0.0 10E3/UL

  March 15, 2017 8:07am











 IMMATURE GRANS # (AUTO)(0.0-0.0 10E3/UL)

  0.1 10E3/UL

  2017 5:40pm











 0.1 10E3/UL

  2017 6:13am



 

 0.0 10E3/UL

  2017 5:52am



 

 0.0 10E3/UL

  2017 5:37am



 

 0.0 10E3/UL

  March 15, 2017 8:07am











 DIFF TYPE 

  AUTOMATED

  2017 5:40pm











 AUTOMATED

  2017 6:13am



 

 AUTOMATED

  2017 5:52am



 

 AUTOMATED

  2017 5:37am



 

 AUTOMATED

  March 15, 2017 8:07am











 UA WITH SCREEN FOR CULTURE 

 

 5778-6: COLOR,URINE 

  YELLOW

  2017 12:25am



 

 71404-6: CLARITY,URINE 

  SLIGHTLY CLOUDY

  2017 12:25am



 

 GLUCOSE, URINE(NEGATIVE MG/DL)

  500 MG/DL

  2017 12:25am



 

 URINE BILIRUBIN(NEGATIVE)

  NEGATIVE

  2017 12:25am



 

 KETONES,URINE(NEGATIVE MG/DL)

  NEGATIVE MG/DL

  2017 12:25am



 

 2965-2: URINE SPECIFIC GRAVITY(1.001-1.035)

  1.024

  2017 12:25am



 

 99592-8: URINE BLOOD(NEGATIVE)

  MODERATE

  2017 12:25am



 

 2756-5: URINE PH(5.0-9.0)

  6.0

  2017 12:25am



 

 URINE PROTEIN(Less than 20 MG/DL)

  Greater than 2000 MG/DL

  2017 12:25am



 

 87331-2: URINE UROBILINOGEN(0.2-1.0 MG/DL)

  0.2-1.0 MG/DL

  2017 12:25am



 

 URINE NITRITE(NEGATIVE)

  NEGATIVE

  2017 12:25am



 

 5799-2: LEUKOCYTE ESTERASE ,URINE(NEGATIVE)

  NEGATIVE

  2017 12:25am



 

 URINE RBCS(0-3 /HPF)

  8-12 /HPF

  2017 12:25am



 

 URINE WBCS(0-3 /HPF)

  21-50 /HPF

  2017 12:25am



 

 URINE EPITHELIAL CELLS(0-3 /HPF)

  21-50 /HPF

  2017 12:25am



 

 URINE HYALINE CASTS(0-3 /LPF)

  13-20 /LPF

  2017 12:25am



 

 URINE BACTERIA(NEGATIVE /HPF)

  3+ /HPF

  2017 12:25am



 

 630-4: URINE CULTURE 

  NOT INDICATED

  2017 12:25am



 

 URINE MICROSCOPIC REQUIRED 

  YES

  2017 12:25am



 

 00020-3: COMPLETE METABOLIC PROFILE 

 

 GLUCOSE( MG/DL)

  221 MG/DL

  2017 5:40pm











 129 MG/DL

  2017 6:13am



 

 80 MG/DL

  2017 5:56am



 

 75 MG/DL

  2017 5:37am



 

 67 MG/DL

  March 15, 2017 8:07am











 BLOOD UREA NITROGEN(6-20 MG/DL)

  59 MG/DL

  2017 5:40pm











 62 MG/DL

  2017 6:13am



 

 58 MG/DL

  2017 5:56am



 

 50 MG/DL

  2017 5:37am



 

 49 MG/DL

  March 15, 2017 8:07am











 CREATININE(0.50-1.20 MG/DL)

  4.09 MG/DL

  2017 5:40pm











 4.32 MG/DL

  2017 6:13am



 

 4.22 MG/DL

  2017 5:56am



 

 3.82 MG/DL

  2017 5:37am



 

 3.88 MG/DL

  March 15, 2017 8:07am











 77691-9: EST GLOMERULAR FILTRATION RATE(Greater than or equal to 60)

  11



Result Comments:



If the patient is of -American descent/extraction 

multiply the eGFR value by 1.212 to obtain the actual eGFR.  

  

>=60  mg/dL    Normal  

 30-59 mg/dL    Moderate Kidney Disease  

 15-29 mg/dL    Severe Kidney Disease  

<15   mg/dL    Kidney Failure

  2017 5:40pm











 11



Result Comments:



If the patient is of -American descent/extraction 

multiply the eGFR value by 1.212 to obtain the actual eGFR.  

  

>=60  mg/dL    Normal  

 30-59 mg/dL    Moderate Kidney Disease  

 15-29 mg/dL    Severe Kidney Disease  

<15   mg/dL    Kidney Failure

  2017 6:13am



 

 11



Result Comments:



If the patient is of -American descent/extraction 

multiply the eGFR value by 1.212 to obtain the actual eGFR.  

  

>=60  mg/dL    Normal  

 30-59 mg/dL    Moderate Kidney Disease  

 15-29 mg/dL    Severe Kidney Disease  

<15   mg/dL    Kidney Failure

  2017 5:56am



 

 12



Result Comments:



If the patient is of -American descent/extraction 

multiply the eGFR value by 1.212 to obtain the actual eGFR.  

  

>=60  mg/dL    Normal  

 30-59 mg/dL    Moderate Kidney Disease  

 15-29 mg/dL    Severe Kidney Disease  

<15   mg/dL    Kidney Failure

  2017 5:37am



 

 12



Result Comments:



If the patient is of -American descent/extraction 

multiply the eGFR value by 1.212 to obtain the actual eGFR.  

  

>=60  mg/dL    Normal  

 30-59 mg/dL    Moderate Kidney Disease  

 15-29 mg/dL    Severe Kidney Disease  

<15   mg/dL    Kidney Failure

  March 15, 2017 8:07am











 BUN CREATININE RATIO(10.0-20.0 RATIO)

  14.0 RATIO

  2017 5:40pm











 14.0 RATIO

  2017 6:13am



 

 14.0 RATIO

  2017 5:56am



 

 13.0 RATIO

  2017 5:37am



 

 13.0 RATIO

  March 15, 2017 8:07am











 SODIUM(135-145 MMOL/L)

  138 MMOL/L

  2017 5:40pm











 137 MMOL/L

  2017 6:13am



 

 134 MMOL/L

  2017 5:56am



 

 135 MMOL/L

  2017 5:37am



 

 134 MMOL/L

  March 15, 2017 8:07am











 POTASSIUM(3.6-5.0 MMOL/L)

  4.3 MMOL/L

  2017 5:40pm











 4.4 MMOL/L

  2017 6:13am



 

 3.8 MMOL/L

  2017 5:56am



 

 3.7 MMOL/L

  2017 5:37am



 

 3.7 MMOL/L

  March 15, 2017 8:07am











 CHLORIDE(101-111 MMOL/L)

  99 MMOL/L

  2017 5:40pm











 103 MMOL/L

  2017 6:13am



 

 106 MMOL/L

  2017 5:56am



 

 108 MMOL/L

  2017 5:37am



 

 105 MMOL/L

  March 15, 2017 8:07am











 CO2(21-31 MMOL/L)

  27 MMOL/L

  2017 5:40pm











 25 MMOL/L

  2017 6:13am



 

 22 MMOL/L

  2017 5:56am



 

 23 MMOL/L

  2017 5:37am



 

 22 MMOL/L

  March 15, 2017 8:07am











 ANION GAP(8-18)

  16

  2017 5:40pm











 13

  2017 6:13am



 

 10

  2017 5:56am



 

 8

  2017 5:37am



 

 11

  March 15, 2017 8:07am











 OSMO CALCULATED(270.0-290.0)

  299.0

  2017 5:40pm











 293.1

  2017 6:13am



 

 283.4

  2017 5:56am



 

 282.1

  2017 5:37am



 

 279.5

  March 15, 2017 8:07am











 CALCIUM(8.5-10.5 MG/DL)

  8.7 MG/DL

  2017 5:40pm











 7.7 MG/DL

  2017 6:13am



 

 7.4 MG/DL

  2017 5:56am



 

 7.3 MG/DL

  2017 5:37am



 

 7.7 MG/DL

  March 15, 2017 8:07am











 BILIRUBIN,TOTAL(0.1-1.2 MG/DL)

  0.7 MG/DL

  2017 5:40pm











 0.4 MG/DL

  2017 6:13am



 

 0.4 MG/DL

  2017 5:56am



 

 0.2 MG/DL

  2017 5:37am



 

 0.4 MG/DL

  March 15, 2017 8:07am











 ALKALINE PHOSPHATASE( IU/L)

  98 IU/L

  2017 5:40pm











 69 IU/L

  2017 6:13am



 

 58 IU/L

  2017 5:56am



 

 62 IU/L

  2017 5:37am



 

 58 IU/L

  March 15, 2017 8:07am











 ASPARTATE AMINO TRANSFERASE(10-42 IU/L)

  24 IU/L

  2017 5:40pm











 13 IU/L

  2017 6:13am



 

 12 IU/L

  2017 5:56am



 

 14 IU/L

  2017 5:37am



 

 16 IU/L

  March 15, 2017 8:07am











 ALANINE AMINOTRANSFERASE(10-60 IU/L)

  32 IU/L

  2017 5:40pm











 19 IU/L

  2017 6:13am



 

 14 IU/L

  2017 5:56am



 

 14 IU/L

  2017 5:37am



 

 16 IU/L

  March 15, 2017 8:07am











 TOTAL PROTEIN(6.4-8.2 G/DL)

  8.2 G/DL

  2017 5:40pm











 6.1 G/DL

  2017 6:13am



 

 5.5 G/DL

  2017 5:56am



 

 5.3 G/DL

  2017 5:37am



 

 5.3 G/DL

  March 15, 2017 8:07am











 ALBUMIN(3.5-5.5 G/DL)

  4.1 G/DL

  2017 5:40pm











 3.1 G/DL

  2017 6:13am



 

 2.8 G/DL

  2017 5:56am



 

 2.6 G/DL

  2017 5:37am



 

 2.8 G/DL

  March 15, 2017 8:07am











 GLOBULIN(2.4-3.6)

  4.1

  2017 5:40pm











 3.0

  2017 6:13am



 

 2.7

  2017 5:56am



 

 2.7

  2017 5:37am



 

 2.5

  March 15, 2017 8:07am











 ALBUMIN/GLOBULIN RATIO(0.9-1.8 RATIO)

  1.0 RATIO

  2017 5:40pm











 1.0 RATIO

  2017 6:13am



 

 1.0 RATIO

  2017 5:56am



 

 1.0 RATIO

  2017 5:37am



 

 1.1 RATIO

  March 15, 2017 8:07am











 3040-3: LIPASE 

 

 3040-3: LIPASE(22-51 U/L)

  27 U/L

  2017 5:40pm



 

 55454-3: GLYCOHEMOGLOBIN A1C 

 

 4548-4: %A1C(4.8-5.6 %)

  6.2 %



Result Comments:



HIGH A1C VALUES:  

    INCREASED RISK FOR DIABETES     5.7-6.4 %  

    DIABETES                        >6.5 %

  2017 6:13am













 Microbiology Results 

 

 Visit/Account #[id number] (2017 5:30pm - March 15, 2017 12:23pm) 

 

 Procedure  Result

 

 2334-1: GASTROCCULT 

 



 2334-1: GASTROCCULT



 

Result Instance On 2017 6:30pm 

Source: [facility]ES[facility]





Result Prompts: 



GASTROCCULT                   POSITIVE 













Allergies and Adverse Reactions







 Allergies and Adverse Reactions 

 

 Patient Unit Number: [id number] 









 Agent  Type  Reaction  Severity  Status

 

 PENICILLINS

  Drug Allergy

  RASH

  Moderate

  Active



 

 CODEINE

  Drug Allergy

  RASH

  Mild

  Active









Problem List







 Problem List 

 

 Visit/Account #[id number] (2017 5:30pm - March 15, 2017 12:23pm) 

 

 Acute Problems:  

 

 Code/Condition  Comments  Documented Start Date  Documented Resolved Date  Code
(s)

 

  

Type 2 diabetes mellitus

   

   

   

   

ICD10: E11.9 Type 2 diabetes mellitus

ICD9: 250.00 Type 2 diabetes mellitus

SNOMED: 25373245 Type 2 diabetes mellitus



 

  

Upper GI bleed

   

   

   

   

ICD10: K92.2 Upper gastrointestinal hemorrhage

ICD9: 578.9 Upper gastrointestinal hemorrhage

SNOMED: 72205129 Upper gastrointestinal hemorrhage



 

  

Ulcerative esophagitis

   

   

   

   

ICD10: K22.10 Ulcerative esophagitis

ICD9: 530.19 Ulcerative esophagitis

SNOMED: 560184860 Ulcerative esophagitis



 

  

Acute on chronic renal failure

   

   

   

   

ICD10: N17.9 Acute-on-chronic renal failure

ICD9: 584.9 Acute-on-chronic renal failure

SNOMED: 322457425 Acute-on-chronic renal failure



 

  

CHF (congestive heart failure)

   

   

   

   

ICD10: I50.9 CHF (congestive heart failure)

ICD9: 428.0 CHF (congestive heart failure)

SNOMED: 38095969 CHF (congestive heart failure)



 

  

Gastritis

   

   

   

   

ICD10: K29.70 Gastritis

ICD9: 535.50 Gastritis

SNOMED: 8479859 Gastritis



 

  

Acute renal failure

   

   

   

   

ICD10: N17.9 Acute renal failure

ICD9: 584.9 Acute renal failure

SNOMED: 79077201 Acute renal failure



 

  

Vomiting and diarrhea

   

   

   

   

ICD10: R11.10 Vomiting and diarrhea

ICD9: 787.03 Vomiting and diarrhea

SNOMED: 567811070 Vomiting and diarrhea



 

 Chronic Problems:  

 

  

Hypertension

   

   

   

   

ICD10: I10 Hypertension

ICD9: 401.9 Hypertension

SNOMED: 35538580 Hypertension



 

  

Anemia, chronic disease

   

   

   

   

ICD10: D63.8 Chronic disease anemia

ICD9: 285.29 Chronic disease anemia

SNOMED: 114060680 Chronic disease anemia



 

  

Diabetes mellitus

   

   

   

   

ICD10: E11.9 Diabetes mellitus

ICD9: 250.00 Diabetes mellitus

SNOMED: 42461486 Diabetes mellitus



 

 Patient Unit Number: [id number] 

 

 Chronic Problems:  

 

 Code/Condition  Comments  Documented Start Date  Documented Resolved Date  Code
(s)

 

  

New onset type 1 diabetes mellitus, uncontrolled

   

   

   

   

ICD10: E10.9 New onset type 1 diabetes mellitus, uncontrolled

ICD9: 250.03 New onset type 1 diabetes mellitus, uncontrolled

SNOMED: 093369932 New onset type 1 diabetes mellitus, uncontrolled



 

  

Anemia

   

   

   

   

ICD10: D64.9 Anemia

ICD9: 285.9 Anemia

SNOMED: 272758967 Anemia









Plan of Care







 Plan Of Care 

 

 Visit/Account #[id number] (2017 5:30pm - March 15, 2017 12:23pm) 

 

 Patient Instructions 

 

 Instructions

 

  DI for Abnormal Uterine Bleeding

Pantoprazole

Metoclopramide

 









Vital Signs







 Vital Signs 

 

 Visit/Account #[id number] (2017 5:30pm - March 15, 2017 12:23pm) 

 

 Sign  First Result  Last Result  Code(s)

 

 Blood Pressure

 

  199/ 84 mm[Hg] On 2017 8:16pm

 199/ 84 mm[Hg] On 2017 8:16pm

  161/ 72 mm[Hg] On March 15, 2017 9:21am

  8480-6  BP Systolic



 

 Heart Rate/Pulse

 

  Pulse Rate (adult):  81 /min On 2017 5:57am

  Pulse Rate (adult):  69 /min On March 15, 2017 9:21am

  8867-4  Heart Rate

 8893-0  Pulse rate



 

 Respiratory Rate

 

  Respiratory Rate:  18 /min On 2017 8:16pm

Respiratory Rate:  18 /min On 2017 8:16pm

  Respiratory Rate:  20 /min On March 15, 2017 9:21am

  9279-1  Respiratory rate



 

 Temperature in Fahrenheit

 

  Temperature (Fahrenheit):  98.1 [degF] On 2017 5:28pm

  Temperature (Fahrenheit):  98.1 [degF] On March 15, 2017 9:21am

  8310-5  Body Temperature



 

 Weight in Kilograms

 

  Weight (Kilograms):  65.4 kg On 2017 5:28pm

   

  3141-9  Weight Measured

 61329-1  Body weight measured in kilograms









Functional Status







 Functional and Cognitive Status 









 No Functional Status Data









Medications







 Home Medications - Medications that the patient was taking prior to arrival at 
the hospital 

 

 Visit/Account #[id number] (2017 5:30pm - March 15, 2017 12:23pm) 

 

 Medication  Route  Sig/Schedule  Precondition/Indication  Comments/Instructions
  Codes

 

 HUMALOG(INSULIN LISPRO) 100 U/ML INSULN.PEN

Dose: 8 U

 

  SUB-Q

  3 TIMES DAILY WITH MEALS

   

   

  3 ML Insulin Lispro 100 UNT/ML Pen Injector [Humalog] (RxNorm): 7595091

 

HUMALOG (INSULIN LISPRO) NDC: 14860692323

 



 

 REGLAN(METOCLOPRAMIDE HCL) 10 MG TABLET

Dose: 10 MG

 

  ORAL

  TWICE A DAY

   

   

  Metoclopramide 10 MG Oral Tablet [Reglan] (RxNorm): 062148

 

REGLAN (METOCLOPRAMIDE HCL) NDC: 19127971912

 



 

 ZANTAC(RaNITIdine HCL) 150 MG TABLET

Dose: 150 MG

 

  ORAL

  DAILY

   

  Rx Instructions:

*RX FILLED 30 DAY SUPPLY 2017*

 

  Ranitidine 150 MG Oral Tablet [Zantac] (RxNorm): 200239

 

ZANTAC (RaNITIdine HCL) NDC: 53405452874

 



 

 LEVEMIR FLEXTOUCH(INSULIN DETEMIR) 100 UNIT/1 ML INSULN.PEN

Dose: 12 UNIT

 

  SUBCUTANEOUSLY

  AT BEDTIME

   

  Rx Instructions:

*RX HAS NOT FILLED*

 

  3 ML insulin detemir 100 UNT/ML Pen Injector [Levemir] (RxNorm): 068021

 

LEVEMIR FLEXTOUCH (INSULIN DETEMIR) NDC: 56264000335

 



 

 TYLENOL(ACETAMINOPHEN) 500 MG TABLET

Dose: 500 MG

 

  ORAL

  Q4H

  PAIN OR FEVER

   

  TYLENOL (ACETAMINOPHEN) NDC: 35364810967

 



 

 Aspirin Ec(ASPIRIN) 81 MG TAB

Dose: 81 MG

 

  ORAL

  DAILY

   

   

  Aspirin 81 MG Delayed Release Oral Tablet (RxNorm): 823926

 

Aspirin Ec (ASPIRIN) NDC: 67392309098

 



 

 Coreg(CARVEDILOL) 25 MG TAB

Dose: 25 MG

 

  ORAL

  WITH BREAKFAST & SUPPER

   

   

  carvedilol 25 MG Oral Tablet [Coreg] (RxNorm): 823143

 

Coreg (CARVEDILOL) NDC: 76490646065

 



 

 Klor-Con M20(POTASSIUM CHLORIDE) 20 MEQ TAB.ER.PRT

Dose: 40 MEQ

 

  ORAL

  WITH BREAKFAST & SUPPER

   

  Rx Instructions:

*PT STATES SHE ONLY TAKES WHEN SHE TAKES FUROSEMIDE*

 

  Klor-Con M20 (POTASSIUM CHLORIDE) NDC: 98579991185

 



 

 Crestor(ROSUVASTATIN CALCIUM) 20 MG TAB

Dose: 20 MG

 

  ORAL

  DAILY

   

  Rx Instructions:

*DR OFFICE HAS LISTED AS 5MG*

 

  Rosuvastatin calcium 20 MG Oral Tablet [Crestor] (RxNorm): 518796

 

Crestor (ROSUVASTATIN CALCIUM) NDC: 91837744202

 



 

 Plavix(CLOPIDOGREL BISULFATE) 75 MG TAB

Dose: 75 MG

 

  ORAL

  DAILY

   

   

  clopidogrel 75 MG Oral Tablet [Plavix] (RxNorm): 592482

 

Plavix (CLOPIDOGREL BISULFATE) NDC: 03342725155

 



 

 Lasix(FUROSEMIDE) 20 MG TAB

Dose: 60 MG

 

  ORAL

  DAILY

   

   

  Furosemide 20 MG Oral Tablet (RxNorm): 959307

 

Lasix (FUROSEMIDE) NDC: 72143868995

 



 

 Aldactone(SPIRONOLACTONE) 50 MG TAB

Dose: 100 MG

 

  ORAL

  DAILY

   

   

  Spironolactone 50 MG Oral Tablet (RxNorm): 064956

 

Aldactone (SPIRONOLACTONE) NDC: 26672929248

 



 

 ZESTORETIC 20-12.5 MG(LISINOPRIL/HYDROCHLOROTHIAZIDE) 1 EACH TABLET

Dose: 1 TAB

 

  ORAL

  DAILY

   

   

  Hydrochlorothiazide 12.5 MG / Lisinopril 20 MG Oral Tablet [Zestoretic] (
RxNorm): 804361

 

ZESTORETIC 20-12.5 MG (LISINOPRIL/HYDROCHLOROTHIAZIDE) NDC: 51256903373

 



 

 PLAVIX(CLOPIDOGREL BISULFATE) 75 MG TAB

Dose: 75 MG

 

  ORAL

  DAILY

   

   

  clopidogrel 75 MG Oral Tablet [Plavix] (RxNorm): 583519

 

PLAVIX (CLOPIDOGREL BISULFATE) NDC: 76788372462

 



 

 ZESTORETIC 20-12.5 MG(LISINOPRIL/HYDROCHLOROTHIAZIDE) 1 EACH TABLET

Dose: 1 TAB

 

  ORAL

  DAILY

   

   

  Hydrochlorothiazide 12.5 MG / Lisinopril 20 MG Oral Tablet [Zestoretic] (
RxNorm): 124980

 

ZESTORETIC 20-12.5 MG (LISINOPRIL/HYDROCHLOROTHIAZIDE) NDC: 06459092332

 



 

 CARVEDILOL(CARVEDILOL) 12.5 MG TABLET

Dose: 12.5 MG

 

  ORAL

  TWICE A DAY

   

   

  carvedilol 12.5 MG Oral Tablet (RxNorm): 164858

 

CARVEDILOL (CARVEDILOL) NDC: 31500212072

 



 

 PLAVIX(CLOPIDOGREL BISULFATE) 75 MG TAB

Dose: 75 MG

 

  ORAL

  DAILY

   

  Rx Instructions:

*RX LF 30 DAY SUPPLY 2017*

 

  clopidogrel 75 MG Oral Tablet [Plavix] (RxNorm): 953874

 

PLAVIX (CLOPIDOGREL BISULFATE) NDC: 93984993790

 



 

 Lisinopril-Hctz 20-12.5 Mg Tab(LISINOPRIL/HYDROCHLOROTHIAZIDE) 1 EACH TABLET

Dose: 1 TAB

 

  ORAL

  DAILY

   

  Rx Instructions:

*RX LF DEC 2016*

 

  Hydrochlorothiazide 12.5 MG / Lisinopril 20 MG Oral Tablet (RxNorm): 452610

 

Lisinopril-Hctz 20-12.5 Mg Tab (LISINOPRIL/HYDROCHLOROTHIAZIDE) NDC: 49702188550

 



 

 REGLAN(METOCLOPRAMIDE HCL) 5 MG TABLET

Dose: 5 MG

 

  ORAL

  TWICE A DAY

   

  Rx Instructions:

*RX LF 30 DAY SUPPLY 2017*

 

  Metoclopramide 5 MG Oral Tablet [Reglan] (RxNorm): 244063

 

REGLAN (METOCLOPRAMIDE HCL) NDC: 35733079800

 



 

 SPIRONOLACTONE(SPIRONOLACTONE) 25 MG TABLET

Dose: 100 MG

 

  ORAL

  DAILY

   

  Rx Instructions:

*PER DR OFFICE LIST. UNABLE TO VERIFY FILLED*

 

  Spironolactone 25 MG Oral Tablet (RxNorm): 757349

 

SPIRONOLACTONE (SPIRONOLACTONE) NDC: 27854065121

 



 

 LASIX(FUROSEMIDE) 40 MG TABLET

Dose: 40 MG

 

  ORAL

  DAILY

   

  Rx Instructions:

*PER DR OFFICE LIST. UNABLE TO VERIFY FILLED*

 

  Furosemide 40 MG Oral Tablet [Lasix] (RxNorm): 345726

 

LASIX (FUROSEMIDE) NDC: 42425912325

 



 

 ZESTORETIC 20-25 MG(LISINOPRIL/HYDROCHLOROTHIAZIDE) 1 EACH TABLET

Dose: 2 TAB

 

  ORAL

  DAILY

   

  Rx Instructions:

*PER DR OFFICE LIST. UNABLE TO VERIFY FILLED*

 

  Hydrochlorothiazide 25 MG / Lisinopril 20 MG Oral Tablet [Zestoretic] (RxNorm)
: 901443

 

ZESTORETIC 20-25 MG (LISINOPRIL/HYDROCHLOROTHIAZIDE) NDC: 61775495841

 



 

 K-DUR(POTASSIUM CHLORIDE) 20 MEQ TAB.ER.PRT

Dose: 20 MEQ

 

  ORAL

  DAILY

   

  Rx Instructions:

*PER DR OFFICE LIST. UNABLE TO VERIFY FILLED*

 

  Microencapsulated Potassium Chloride 20 MEQ Extended Release Oral Tablet (
RxNorm): 2246743

 

K-DUR (POTASSIUM CHLORIDE) NDC: 71725737413

 













 Inpatient/Ordered Medications - Medications administered during hospital visit 

 

 Visit/Account #[id number] (2017 5:30pm - March 15, 2017 12:23pm) 

 

 Medication  Route  Sig/Schedule  Precondition/Indication  Comments/Instructions
  Codes

 

 IV Medication

 

Carriers:

 

NORMAL SALINE(SODIUM CHLORIDE) 1000 ML INJECTION

Dose: 1000 ML

 

  INTRAVEN

  .Q1H (Rate: 1000 MLS/HR Duration: 1 HR) 

 

   

   

  Carriers:

 

 1000 ML Sodium Chloride 9 MG/ML Injection (RxNorm): 8562720

 

NORMAL SALINE (SODIUM CHLORIDE) NDC: 18453682937

 



 

 ZOFRAN INJ(ONDANSETRON HCL) 4 MG/2 ML INJECTION

Dose: 2 ML

 

  INTRAVEN

  NOW

   

   

  2 ML Ondansetron 2 MG/ML Injection (RxNorm): 2351414

 

ZOFRAN INJ (ONDANSETRON HCL) NDC: 75464563058

 



 

 TYLENOL(ACETAMINOPHEN) 325 MG TAB

Dose: 650 MG

 

  ORAL

  NOW

   

  Label Comments:

DO NOT EXCEED 4000 MG PER 24 HR

 

  Acetaminophen 325 MG Oral Tablet (RxNorm): 095095

 

TYLENOL (ACETAMINOPHEN) NDC: 44114861961

 



 

 TYLENOL(ACETAMINOPHEN) 325 MG TAB

Dose: 0 MG

 

  ORAL

  Q6H

  PRN Reason: MILD PAIN

  Label Comments:

Do not exceed 4000 mg/24 hours.

 

Special Dose Instructions:

 325 - 650 MG

 

  Acetaminophen 325 MG Oral Tablet (RxNorm): 303292

 

TYLENOL (ACETAMINOPHEN) NDC: 36212575469

 



 

 ZOFRAN INJ(ONDANSETRON HCL) 4 MG/2 ML INJECTION

Dose: 2 ML

 

  INTRAVEN

  Q4H

  PRN Reason: NAUSEA/VOMITING

   

  2 ML Ondansetron 2 MG/ML Injection (RxNorm): 1703290

 

ZOFRAN INJ (ONDANSETRON HCL) NDC: 22191111683

 



 

 PROTONIX INJ(PANTOPRAZOLE) 40 MG INJECTION

Dose: 40 MG

 

  INTRAVEN

  DAILY@06

   

  Label Comments:

Dilute with 10 mL of  NS and push over 2 minutes

 

  pantoprazole 40 MG Injection [Protonix] (RxNorm): 645387

 

PROTONIX INJ (PANTOPRAZOLE) NDC: 38966058722

 



 

 IV Medication

 

Carriers:

 

NORMAL SALINE(SODIUM CHLORIDE) 1000 ML INJECTION

Dose: 1000 ML

 

  INTRAVEN

  .Q8H (Rate: 125 MLS/HR Duration: 8 HR) 

 

   

   

  Carriers:

 

 1000 ML Sodium Chloride 9 MG/ML Injection (RxNorm): 9002184

 

NORMAL SALINE (SODIUM CHLORIDE) NDC: 70163757106

 



 

 LEVEMIR(INSULIN DETEMIR) 1000 UNITS/10 ML INJECTION

Dose: 0.06 ML

 

  SUBCUTANEOUSLY

  AT BEDTIME

   

  Label Comments:

FORMULARY SUBSTITUTION OF LANTUS

 

  insulin detemir 100 UNT/ML Injectable Solution [Levemir] (RxNorm): 334697

 

LEVEMIR (INSULIN DETEMIR) NDC: 31599648695

 



 

 COREG(CARVEDILOL) 12.5 MG TAB

Dose: 12.5 MG

 

  ORAL

  WITH BREAKFAST & SUPPER

   

  Label Comments:

MAY INCREASE FALL RISK

TAKE WITH FOOD

 

  carvedilol 12.5 MG Oral Tablet [Coreg] (RxNorm): 188912

 

COREG (CARVEDILOL) NDC: 07610712283

 



 

 SUBLIMAZE INJ(FentaNYL CITRATE) 250 MCG/5 ML INJECTION

Dose: 250 MCG

 

  Route

  .STK-MED

   

  Label Comments:

GIVE BY SLOW PUSH OVER 2-5 MIN

MAY INCREASE FALL RISK

 

  5 ML Fentanyl 0.05 MG/ML Injection (RxNorm): 7853360

 

SUBLIMAZE INJ (FentaNYL CITRATE) NDC: 64264411044

 



 

 VERSED INJ(MIDAZOLAM HCL) 10 MG/10 ML INJECTION

Dose: 10 MG

 

  Route

  .STK-MED

   

  Label Comments:

MAY INCREASE FALL RISK

 

  Midazolam 1 MG/ML Injectable Solution (RxNorm): 934635

 

VERSED INJ (MIDAZOLAM HCL) NDC: 42004673144

 



 

 IV Medication

 

Additives:

 

REGLAN INJ(METOCLOPRAMIDE HCL) 5 MG/ML INJECTION

Dose: 20 MG

 

Carriers:

 

NORMAL SALINE(SODIUM CHLORIDE) 50 ML INJECTION

Dose: 50 ML

 

  INTRAVEN

  Q6H (Rate: 216 MLS/HR Duration: 15 MIN) 

 

   

  Label Comments:

MAY INCREASE FALL RISK

 

  Additives:

 

 2 ML Metoclopramide 5 MG/ML Injection (RxNorm): 769629

 

REGLAN INJ (METOCLOPRAMIDE HCL) NDC: 05267886560

 

Carriers:

 

 50 ML Sodium Chloride 9 MG/ML Injection (RxNorm): 2375862

 

NORMAL SALINE (SODIUM CHLORIDE) NDC: 57271210966

 



 

 TUMS(CALCIUM CARBONATE) 500 MG TAB

Dose: 500 MG

 

  ORAL

  EVERY 6 HOURS

  PRN Reason: INDIGESTION

  Label Comments:

TAKE WITH FOOD

 

  Calcium Carbonate 500 MG Chewable Tablet (RxNorm): 626857

 

TUMS (CALCIUM CARBONATE) NDC: 86745398549

 



 

 SUBLIMAZE INJ(FentaNYL CITRATE) 100 MCG/2 ML INJECTION

Dose: 1 ML

 

  INTRAVEN

  EVERY 4 HOURS

  Pain

  Label Comments:

GIVE BY SLOW PUSH OVER 2-5 MIN

MAY INCREASE FALL RISK

 

  SUBLIMAZE INJ (FentaNYL CITRATE) NDC: 67535421758

 



 

 AMBIEN(ZOLPIDEM TARTRATE) 10 MG TAB

Dose: 10 MG

 

  ORAL

  AT BEDTIME

  PRN Reason: SLEEP

  Label Comments:

MAY INCREASE FALL RISK

 

  Zolpidem tartrate 10 MG Oral Tablet (RxNorm): 945437

 

AMBIEN (ZOLPIDEM TARTRATE) NDC: 11473844444

 



 

 NORVASC(AmLODIpine BESYLATE) 10 MG TAB

Dose: 10 MG

 

  ORAL

  DAILY

   

  Label Comments:

MAY INCREASE FALL RISK

 

  Amlodipine 10 MG Oral Tablet [Norvasc] (RxNorm): 358653

 

NORVASC (AmLODIpine BESYLATE) NDC: 37477933557

 



 

 PROTONIX(PANTOPRAZOLE SOD) 40 MG TAB

Dose: 40 MG

 

  ORAL

  BID@,

   

   

  pantoprazole 40 MG Delayed Release Oral Tablet [Protonix] (RxNorm): 444093

 

PROTONIX (PANTOPRAZOLE SOD) NDC: 68929942419

 



 

 REGLAN(METOCLOPRAMIDE HCL) 10 MG TAB

Dose: 10 MG

 

  ORAL

  3 TIMES DAILY WITH MEALS

   

  Rx Order Comments:

Order filed UNV:

Allergies/Duplicates/Interactions differ from 

 

Label Comments:

MAY INCREASE FALL RISK

 

  Metoclopramide 10 MG Oral Tablet (RxNorm): 851335

 

REGLAN (METOCLOPRAMIDE HCL) NDC: 85033999637

 



 

 IV Medication

 

Carriers:

 

NORMAL SALINE(SODIUM CHLORIDE) 1000 ML INJECTION

Dose: 1000 ML

 

  INTRAVEN

  .Q1H (Rate: 1000 MLS/HR Duration: 1 HR) 

 

   

   

  Carriers:

 

 1000 ML Sodium Chloride 9 MG/ML Injection (RxNorm): 4684758

 

NORMAL SALINE (SODIUM CHLORIDE) NDC: 68288048996

 













 Discharge Medications - Medications that patient should continue to take. 
Review with physician 

 

 Visit/Account #[id number] (2017 5:30pm - March 15, 2017 12:23pm) 

 

 Medication  Route  Sig/Schedule  Precondition/Indication  Comments/Instructions
  Codes

 

 LEVEMIR FLEXTOUCH(INSULIN DETEMIR) 100 UNIT/1 ML INSULN.PEN

Dose: 12 UNIT

 

  SUBCUTANEOUSLY

  AT BEDTIME

   

  Rx Instructions:

*RX HAS NOT FILLED*

 

  3 ML insulin detemir 100 UNT/ML Pen Injector [Levemir] (RxNorm): 933310

 

LEVEMIR FLEXTOUCH (INSULIN DETEMIR) NDC: 41590599966

 



 

 TYLENOL(ACETAMINOPHEN) 500 MG TABLET

Dose: 500 MG

 

  ORAL

  Q4H

  PAIN OR FEVER

   

  TYLENOL (ACETAMINOPHEN) NDC: 85065320098

 



 

 Aspirin Ec(ASPIRIN) 81 MG TAB

Dose: 81 MG

 

  ORAL

  DAILY

   

   

  Aspirin 81 MG Delayed Release Oral Tablet (RxNorm): 763380

 

Aspirin Ec (ASPIRIN) NDC: 69938542733

 



 

 Crestor(ROSUVASTATIN CALCIUM) 20 MG TAB

Dose: 20 MG

 

  ORAL

  DAILY

   

  Rx Instructions:

*DR OFFICE HAS LISTED AS 5MG*

 

  Rosuvastatin calcium 20 MG Oral Tablet [Crestor] (RxNorm): 145684

 

Crestor (ROSUVASTATIN CALCIUM) NDC: 48873632773

 



 

 CARVEDILOL(CARVEDILOL) 12.5 MG TABLET

Dose: 12.5 MG

 

  ORAL

  TWICE A DAY

   

   

  carvedilol 12.5 MG Oral Tablet (RxNorm): 286545

 

CARVEDILOL (CARVEDILOL) NDC: 24244577429

 



 

 SPIRONOLACTONE(SPIRONOLACTONE) 25 MG TABLET

Dose: 100 MG

 

  ORAL

  DAILY

   

  Rx Instructions:

*PER DR OFFICE LIST. UNABLE TO VERIFY FILLED*

 

  Spironolactone 25 MG Oral Tablet (RxNorm): 023982

 

SPIRONOLACTONE (SPIRONOLACTONE) NDC: 89667506687

 



 

 K-DUR(POTASSIUM CHLORIDE) 20 MEQ TAB.ER.PRT

Dose: 20 MEQ

 

  ORAL

  DAILY

   

  Rx Instructions:

*PER DR OFFICE LIST. UNABLE TO VERIFY FILLED*

 

  Microencapsulated Potassium Chloride 20 MEQ Extended Release Oral Tablet (
RxNorm): 5922973

 

K-DUR (POTASSIUM CHLORIDE) NDC: 34556962448

 



 

 Metoclopramide(METOCLOPRAMIDE HCL) 10 MG TAB

Dose: 10 MG

 

  ORAL

  3 TIMES DAILY WITH MEALS

   

  Rx Instructions:

Take 1 tablet with meals three times daily. 

 

  Metoclopramide (METOCLOPRAMIDE HCL) NDC: 46739207996

 



 

 PROTONIX(PANTOPRAZOLE) 40 MG TAB

Dose: 40 MG

 

  ORAL

  BID@,

   

  Rx Instructions:

Take one tablet twice daily. 

 

  pantoprazole 40 MG Delayed Release Oral Tablet [Protonix] (RxNorm): 417626

 

PROTONIX (PANTOPRAZOLE) NDC: 81444897485

 



 

 NORVASC(AmLODIpine BESYLATE) 10 MG TAB

Dose: 10 MG

 

  ORAL

  DAILY

   

   

  Amlodipine 10 MG Oral Tablet [Norvasc] (RxNorm): 516243

 

NORVASC (AmLODIpine BESYLATE) NDC: 10617460231

 









History Of Encounters







 Encounters 

 

 Visit/Account #[id number] (2017 5:30pm - March 15, 2017 12:23pm) 

 

 Account Status  Physican Of Record  Reason For Visit  Visit Diagnosis  Start 
Date/Time  Stop Date/Time

 

 ER

  SOPHIA BARAJAS MD

  GI BLEED

  Not Available

  Mar 11, 2017 5:30pm

  Mar 11, 2017 7:59pm



 

 IN

  HERMINIA GARCIA MD

  GI BLEED

  Not Available

  Mar 11, 2017 6:56pm

  Mar 15, 2017 12:23pm









History of Procedures







 Procedure List 









 No procedures recorded.









Discharge Instructions







 Discharge Instructions

 

 Visit/Account #[id number] (2017 5:30pm - March 15, 2017 12:23pm)

 

 DISCHARGE INSTRUCTIONS Physician Documentation

 

PROVIDER INSTRUCTIONS

 

Other Discharge Instructions

Care Management to help get meds for discharge. Drink plenty of fluids at

home and advance diet as tolerated.

 

Other Discharge Instructions

Care Management to help get meds for discharge. Drink plenty of fluids at

home and advance diet as tolerated.

 

CARE MANAGEMENT/HOME HEALTH

 

Care Management Please help get discharge meds. Patient will need screened for 
purple card.

 

REASON TO CALL PROVIDER

 

 

Notify Physician if: Have vomiting, bleeding, or new symptoms

 

FOLLOW UP APPOINTMENTS

 

 

Follow Up Appointment Date/Time:

 1) On 2017 your screening for purple card is at 9:30 A.M

at [facility] ([phone]).  Then you will have Lab (BMP) done at 
10:00 A.M

 2) Pallavi Adam at [facility] ([phone]) on 2017 at 11:00 A.M

 

 

Follow-up tests/procedures/outpatient needs: BMP on Friday, 3/17/17 at [facility]

 









Social History







 Social History 









 No Social History Data.









Immunizations







 Immunizations

 

 Patient Unit Number: [id number]

 

 Immunizations

 

 No immunizations recorded.

## 2019-03-03 NOTE — XMS REPORT
[facility]

 Created on: 2014



ShineArlinIngrid

External Reference #: [id number]

: 1960

Sex: Female



Demographics







 Address  [address] [place]  [address]

 

 Home Phone  [phone]

 

 Preferred Language  Unknown

 

 Marital Status  Unknown

 

 Presybeterian Affiliation  Unknown

 

 Race  White

 

 Ethnic Group  Not  or 





Author







 Author  Kimi Corrales

 

 [facility]  eClinical[employer]

 

 Address  Unknown

 

 Phone  Unavailable







Care Team Providers







 Care Team Member Name  Role  Phone

 

 Kimi Corrales    Unavailable



                                                                



Allergies

          No Known Allergies                                                   
                                     



Problems

          





 Problem Type  Condition  ICD-9 Code  Onset Dates  Condition Status

 

 Problem  Diabetes Mellitus Type 2, not stated as uncontrolled  250.00     
Active

 

 Problem  Hypertension, benign  401.1     Active

 

 Problem  Hypertensive retinopathy  362.11     Active

 

 Assessment  Dysuria  788.1     Active



                                                                               
                                       



Medications

          





 Medication  Code System  Code  Instructions  Start Date  End Date  Status  
Dosage

 

 Metformin HCl  NDC  [address]-95  500 MG Orally twice a day  2014     
Active  1 tablet with meals

 

 Lisinopril-Hydrochlorothiazide  NDC  [address]-22  10-12.5 MG Orally Once a 
day  Dec 17, 2014     Active  1 tablet

 

 Lantus SoloStar  NDC  [address]-78  100 UNIT/ML Subcutaneous Once a day at 
bedtime  2014     Active  12 units

 

 Acetaminophen  NDC  [address]-45  500 MG Orally prn        Active  1 capsule 
as needed

 

 NovoFine  NDC  07567  32G X 6 MM with pen QID  2014     Active  as 
directed

 

 Clonidine HCl  NDC  [address]-10  0.1 MG Orally Once or twice a day  Dec 18, 
2014     Active  take 1 tablet if BP is above 180/90. Do not take more than 2 
tablets in one day.

 

 Humalog KwikPen  NDC  [address]-04  100 UNIT/ML Subcutaneous 3 times a day  
2014     Active  8 units



                                                                               
                                                                     



Procedures

          





 Procedure  Coding System  Code  Date

 

 URINE CULTURE  CPT-4  76732  Dec 23, 2014

 

 URINALYSIS, IN HOUSE  CPT-4  65702  Dec 23, 2014



                                                                               
                             



Vital Signs

          





 Date/Time:  Dec 17, 2014

 

 BMI  27.10 Index

 

 Height  66.5 inches

 

 Weight  170.5 lbs

 

 Respiratory Rate  16 per Minute

 

 Blood Pressure Diastolic  82 mm Hg

 

 Blood Pressure Systolic  166 mm Hg

 

 Cardiac Monitoring Heart Rate  96 Beats per Minute



                                                                    



Results

          





 Name  Result  Date  Reference Range  Unit









 In House Urinalysis, automated



                                                                    



Summary Purpose

          eClinicalWorks Submission

## 2019-03-03 NOTE — XMS REPORT
[facility].

 Created on: 10/17/2014



Ingrid Riojas

External Reference #: [id number]

: 1970

Sex: Female



Demographics







 Address  [address] [place]  [address]

 

 Home Phone  [phone]

 

 Preferred Language  Unknown

 

 Marital Status  Unknown

 

 Synagogue Affiliation  Unknown

 

 Race  White

 

 Ethnic Group  Not  or 





Author







 Author  Ann Saleh

 

 Organization  [employer]

 

 Address  Unknown

 

 Phone  Unavailable







Care Team Providers







 Care Team Member Name  Role  Phone

 

 Ann Saleh    Unavailable



                                                                



Allergies

          No Known Allergies                                                   
                                     



Problems

          No Known Problems                                                    
                                    



Medications

          No Known Medications                                                 
                                                 



Vital Signs

          





 Date/Time:  Oct 10, 2014

 

 Height  66.5 inches

 

 Weight  158.5 lbs

 

 Temperature  99.1 F

 

 Blood Pressure Diastolic  78 mm Hg

 

 Blood Pressure Systolic  156 mm Hg

 

 Cardiac Monitoring Heart Rate  80 Beats per Minute

 

 BMI  25.20 Index

 

 Respiratory Rate  16 per Minute



                                                                              



Results

          No Known Results                                                     
               



Summary Purpose

          eClinicalWorks Submission

## 2019-03-03 NOTE — XMS REPORT
[facility].

 Created on: 2014



Ingrid Riojas

External Reference #: [id number]

: 1960

Sex: Female



Demographics







 Address  [address]  [address]

 

 Home Phone  [phone]

 

 Preferred Language  Unknown

 

 Marital Status  Unknown

 

 Latter day Affiliation  Unknown

 

 Race  White

 

 Ethnic Group  Not  or 





Author







 Author  Kimi Corrales

 

 [facility]  eClinical[employer]

 

 Address  Unknown

 

 Phone  Unavailable







Care Team Providers







 Care Team Member Name  Role  Phone

 

 Kimi Corrales    Unavailable



                                                                



Allergies

          No Known Allergies                                                   
                                     



Problems

          





 Problem Type  Condition  ICD-9 Code  Onset Dates  Condition Status

 

 Problem  Hypertension, benign  401.1     Active

 

 Problem  Diabetes Mellitus Type 2, not stated as uncontrolled  250.00     
Active



                                                                               
                   



Medications

          No Known Medications                                                 
                                                 



Vital Signs

          





 Date/Time:  2014

 

 Height  66.5 inches

 

 Weight  158.8 lbs

 

 Temperature  98.3 F

 

 Blood Pressure Diastolic  72 mm Hg

 

 Blood Pressure Systolic  160 mm Hg

 

 Cardiac Monitoring Heart Rate  112 Beats per Minute

 

 BMI  25.24 Index

 

 Respiratory Rate  16 per Minute



                                                                              



Results

          No Known Results                                                     
               



Summary Purpose

          eClinicalWorks Submission

## 2019-03-03 NOTE — XMS REPORT
[facility] Continuity Of Care Document

 Created on: 2017



KIMBERLY KINCAID

External Reference #: [id number]

: 1960

Sex: Female



Demographics







 Address  [address]  [address]

 

 Preferred Language  Unknown

 

 Marital Status  Never 

 

 Catholic Affiliation  Unknown

 

 Race  White

 

 Ethnic Group  Unknown





Author







 Author  [facility]

 

 Organization  [facility]

 

 Address  [address] [place]  [address]



 

 Phone  [phone]







Support







 Name  Relationship  Address  Phone

 

 OLIVIA KINCAID  Next Of Kin  [address]  [address]  [phone]

 

 OLIVIA KINCAID  ECON  [address]  [address]  [phone]







Care Team Providers







 Care Team Member Name  Role  Phone

 

 SILVA STEPHENS  PP  [phone]

 

 GEE BROOKS MD  AT  [phone]







Results







 Lab Results 

 

 Visit/Account #[id number] (2017 3:54pm - 2017 2:40pm) 

 

 Test  Result  Date/Time

 

 POCGL 

 

 POCGL( MG/DL)

  134 MG/DL

  2017 5:09pm











 137 MG/DL

  2017 10:11pm



 

 104 MG/DL

  2017 5:19am



 

 174 MG/DL

  2017 11:12am















Allergies and Adverse Reactions







 Allergies and Adverse Reactions 

 

 Patient Unit Number: [id number] 









 Agent  Type  Reaction  Severity  Status

 

 PENICILLINS

  Drug Allergy

  RASH

  Moderate

  Active



 

 CODEINE

  Drug Allergy

  RASH

  Mild

  Active



 

 HYDROCODONE

  Drug Allergy

  VOMITING

  Moderate

  Active









Problem List







 Problem List 

 

 Patient Unit Number: [id number] 

 

 Chronic Problems:  

 

 Code/Condition  Comments  Documented Start Date  Documented Resolved Date  Code
(s)

 

  

New onset type 1 diabetes mellitus, uncontrolled

   

   

   

   

ICD9: 250.03 New onset type 1 diabetes mellitus, uncontrolled

SNOMED: 741175513 New onset type 1 diabetes mellitus, uncontrolled



 

  

Depression

   

   

   

   

ICD10: F32.9 Depression

SNOMED: 36119896 Depression



 

  

Tobacco use

   

   

   

   

ICD10: Z72.0 Tobacco use

SNOMED: 991341934 Tobacco use



 

  

End-stage renal disease

   

   

   

   

ICD10: N18.6 End-stage renal disease

SNOMED: 37249239 End-stage renal disease



 

  

Anemia in ESRD (end-stage renal disease)

   

   

   

   

ICD10: N18.6 Anemia in ESRD (end-stage renal disease)

SNOMED: 167357135 Anemia in end-stage renal disease



 

  

Hypertension

   

   

   

   

ICD9: 401.9 Hypertension

SNOMED: 95129910 Hypertension



 

  

Anemia, chronic disease

   

   

   

   

ICD10: D63.8 Anemia, chronic disease

SNOMED: 213552938 Chronic disease anemia



 

  

Anemia

   

   

   

   

ICD10: D64.9 Anemia

SNOMED: 706733745 Anemia



 

  

CHF (congestive heart failure)

   

   

   

   

ICD10: I50.9 CHF (congestive heart failure)

SNOMED: 19243092 CHF (congestive heart failure)



 

  

Diabetes mellitus

   

   

   

   

ICD10: E11.9 Diabetes mellitus

SNOMED: 52882900 Diabetes mellitus



 

  

Type 2 diabetes mellitus

   

   

   

   

ICD10: E11.9 Type 2 diabetes mellitus

SNOMED: 86923949 Type 2 diabetes mellitus



 

  

Acute on chronic renal failure

   

   

   

   

ICD10: N17.9 Acute on chronic renal failure

SNOMED: 775469857 Acute-on-chronic renal failure



 

  

Coronary artery disease

   

   

   

   

ICD10: I25.10 Coronary artery disease

SNOMED: 78129777 Coronary artery disease









Plan of Care







 Plan Of Care 









 No Plan Of Care Data.









Vital Signs







 Vital Signs 

 

 Visit/Account #[id number] (2017 3:54pm - 2017 2:40pm) 

 

 Sign  First Result  Last Result  Code(s)

 

 Blood Pressure

 

  167/ 77 mm[Hg] On 2017 8:05pm

  149/ 61 mm[Hg] On 2017 5:13am

  8462-4  BP Diastolic

 8480-6  BP Systolic



 

 Heart Rate/Pulse

 

  Pulse Rate (adult):  74 /min On 2017 8:05pm

  Pulse Rate (adult):  75 /min On 2017 5:13am

  8867-4  Heart Rate



 

 Respiratory Rate

 

  Respiratory Rate:  20 /min On 2017 8:05pm

  Respiratory Rate:  24 /min On 2017 5:13am

  9279-1  Respiratory Rate



 

 Temperature in Fahrenheit

 

  Temperature (Fahrenheit):  97.0 [degF] On 2017 8:05pm

  Temperature (Fahrenheit):  98.0 [degF] On 2017 5:13am

  8310-5  Body Temperature









Functional Status







 Functional and Cognitive Status 









 No Functional Status Data









Medications







 Home Medications - Medications that the patient was taking prior to arrival at 
the hospital 

 

 Visit/Account #[id number] (2017 3:54pm - 2017 2:40pm) 

 

 Medication  Route  Sig/Schedule  Precondition/Indication  Comments/Instructions
  Codes

 

 TYLENOL(ACETAMINOPHEN) 500 MG TABLET

Dose: 500 MG

 

  ORAL

  Q4H

  PAIN OR FEVER

   

  TYLENOL (ACETAMINOPHEN) NDC: 46171253126

 



 

 Aspirin Ec(ASPIRIN) 81 MG TAB

Dose: 81 MG

 

  ORAL

  DAILY

   

   

  Aspirin 81 MG Delayed Release Oral Tablet (RxNorm): 098616

 

Aspirin Ec (ASPIRIN) NDC: 97020234155

 



 

 PROTONIX(PANTOPRAZOLE) 40 MG TAB

Dose: 40 MG

 

  ORAL

  DAILY

   

   

  pantoprazole 40 MG Delayed Release Oral Tablet [Protonix] (RxNorm): 946921

 

PROTONIX (PANTOPRAZOLE) NDC: 16407114603

 



 

 LANTUS PEN(INSULIN GLARGINE) 300 UNIT/3 ML INJECTION

Dose: 12 UNITS

 

  SUBCUTANEOUSLY

  AT BEDTIME

   

   

  3 ML Insulin Glargine 100 UNT/ML Pen Injector [Lantus] (RxNorm): 537046

 

LANTUS PEN (INSULIN GLARGINE) NDC: 30617653844

 



 

 LASIX(FUROSEMIDE) 40 MG TABLET

Dose: 160 MG

 

  ORAL

  DAILY

   

   

  Furosemide 40 MG Oral Tablet [Lasix] (RxNorm): 693787

 

LASIX (FUROSEMIDE) NDC: 68965883567

 



 

 LIVALO(PITAVASTATIN) 2 MG TABLET

Dose: 2 MG

 

  ORAL

  AT BEDTIME

   

   

  pitavastatin 2 MG Oral Tablet [Livalo] (RxNorm): 716693

 

LIVALO (PITAVASTATIN) NDC: 61515592553

 



 

 Coreg Cr(CARVEDILOL PHOSPHATE) 40 MG CPMP.24HR

Dose: 40 MG

 

  ORAL

  DAILY

   

   

  24 HR carvedilol phosphate 40 MG Extended Release Oral Capsule [Coreg] (RxNorm
): 955147

 

Coreg Cr (CARVEDILOL PHOSPHATE) NDC: 69339178068

 



 

 APRESOLINE(HydrALAZINE) 50 MG TAB

Dose: 100 MG

 

  ORAL

  3 TIMES A DAY

   

   

  Hydralazine Hydrochloride 50 MG Oral Tablet (RxNorm): 975521

 

APRESOLINE (HydrALAZINE) NDC: 35832058634

 



 

 Prinivil(LISINOPRIL) 40 MG TABLET

Dose: 40 MG

 

  ORAL

  DAILY

   

   

  Lisinopril 40 MG Oral Tablet (RxNorm): 427374

 

Prinivil (LISINOPRIL) NDC: 99738577763

 













 Inpatient/Ordered Medications - Medications administered during hospital visit 

 

 Visit/Account #[id number] (2017 3:54pm - 2017 2:40pm) 

 

 Medication  Route  Sig/Schedule  Precondition/Indication  Comments/Instructions
  Codes

 

 IV Medication

 

Carriers:

 

VANCOCIN 1 GM/200 ML PM(VANCOMYCIN/DEXTROSE,ISO-OSM) 1 GM/200 ML INJECTION

Dose: 200 ML

 

  INTRAVEN

  .AMBS.AQUILES (Rate: 200 MLS/HR Duration: 1 HR) 

 

  Clinical Indication: ABX Preop Prophylaxis

  Rx Order Comments:

Rule Override: POM.PREABX - Beta-Lactam Allergy - By User: ROCIONAY

 

  Carriers:

 

 200 ML Vancomycin 5 MG/ML Injection (RxNorm): 2300030

 

VANCOCIN 1 GM/200 ML PM (VANCOMYCIN/DEXTROSE,ISO-OSM) NDC: 62010293116

 



 

 VERSED INJ(MIDAZOLAM HCL) 2 MG/2 ML INJECTION

Dose: 2 ML

 

  INTRAVEN

  .PSCU.AQUILES

   

   

  2 ML Midazolam 1 MG/ML Injection (RxNorm): 9775759

 

VERSED INJ (MIDAZOLAM HCL) NDC: 32124139110

 



 

 XYLOCAINE-MPF 1% INJ(LIDOCAINE HCL/PF) 20 MG/2 ML INJECTION

Dose: 0.1 ML

 

  INTRADERM

  AS NEEDED

   

  Label Comments:

For peripheral line insertion site.

 

  XYLOCAINE-MPF 1% INJ (LIDOCAINE HCL/PF) NDC: 24542631011

 



 

 XYLOCAINE-MPF 1% INJ(LIDOCAINE HCL/PF) 20 MG/2 ML INJECTION

Dose: 20 MG

 

  Route

  .STK-MED

   

   

  XYLOCAINE-MPF 1% INJ (LIDOCAINE HCL/PF) NDC: 30587202275

 



 

 HEPARIN 49842 UNIT/10 ML INJECTION

Dose: 01195 UNIT

 

  Route

  .STK-MED

   

   

  heparin sodium, porcine 1000 UNT/ML Injectable Solution (RxNorm): 2493070

 

 (HEPARIN) NDC: 85003432498

 



 

 DILAUDID(HYDROmorphone HCL) 2 MG/ML INJECTION

Dose: 4 ML

 

  Route

  .STK-MED

   

  Label Comments:

MAY INCREASE FALL RISK

 

  1 ML Hydromorphone Hydrochloride 2 MG/ML Cartridge (RxNorm): 0773869

 

DILAUDID (HYDROmorphone HCL) NDC: 52815797910

 



 

 ECOTRIN(ASPIRIN) 81 MG TAB

Dose: 81 MG

 

  ORAL

  DAILY

   

   

  Aspirin 81 MG Delayed Release Oral Tablet (RxNorm): 427804

 

ECOTRIN (ASPIRIN) NDC: 83502901811

 



 

 APRESOLINE(HydrALAZINE) 50 MG TAB

Dose: 100 MG

 

  ORAL

  3 TIMES A DAY

   

  Label Comments:

MAY INCREASE FALL RISK

 

  Hydralazine Hydrochloride 50 MG Oral Tablet (RxNorm): 567732

 

APRESOLINE (HydrALAZINE) NDC: 75815455814

 



 

 LANTUS(INSULIN GLARGINE) 1000 UNIT/10 ML INJECTION

Dose: 0.12 ML

 

  SUBCUTANEOUSLY

  AT BEDTIME

   

  Label Comments:

Do NOT refrigerate. * Syringe expires in 24 hours



DO NOT MIX WITH OTHER INSULINS

 

  Insulin Glargine 100 UNT/ML Injectable Solution [Lantus] (RxNorm): 084482

 

LANTUS (INSULIN GLARGINE) NDC: 23109022998

 



 

 ZESTRIL(LISINOPRIL) 40 MG TAB

Dose: 40 MG

 

  ORAL

  DAILY

   

  Label Comments:

MAY INCREASE FALL RISK

 

  Lisinopril 40 MG Oral Tablet (RxNorm): 055542

 

ZESTRIL (LISINOPRIL) NDC: 55695842101

 



 

 PROTONIX(PANTOPRAZOLE SOD) 40 MG TAB

Dose: 40 MG

 

  ORAL

  DAILY@0600

   

   

  pantoprazole 40 MG Delayed Release Oral Tablet [Protonix] (RxNorm): 291343

 

PROTONIX (PANTOPRAZOLE SOD) NDC: 66808722697

 



 

 ZOCOR(SIMVASTATIN) 20 MG TAB

Dose: 20 MG

 

  ORAL

  AT BEDTIME

   

  Label Comments:

AUTO SUB FOR PITAVASTATIN 2 MG

TERATOGENIC. PREGNANT WOMEN SHOULD NOT HANDLE OR 

CRUSH.

 

  Simvastatin 20 MG Oral Tablet (RxNorm): 076471

 

ZOCOR (SIMVASTATIN) NDC: 93880062400

 



 

 COREG CR(CARVEDILOL) 10 MG CAP

Dose: 40 MG

 

  ORAL

  DAILY

   

  Label Comments:

MAY INCREASE FALL RISK

 

  24 HR carvedilol phosphate 10 MG Extended Release Oral Capsule [Coreg] (RxNorm
): 989641

 

COREG CR (CARVEDILOL) NDC: 34795888405

 



 

 TYLENOL(ACETAMINOPHEN) 325 MG TAB

Dose: 0 MG

 

  ORAL

  EVERY 6 HOURS

  PRN Reason: PAIN OR FEVER

  Label Comments:

DO NOT EXCEED 4000 MG PER 24 HR

 

Special Dose Instructions:

 325-650 MG

 

  Acetaminophen 325 MG Oral Tablet (RxNorm): 516815

 

TYLENOL (ACETAMINOPHEN) NDC: 47667994796

 













 Discharge Medications - Medications that patient should continue to take. 
Review with physician 

 

 Visit/Account #[id number] (2017 3:54pm - 2017 2:40pm) 

 

 Medication  Route  Sig/Schedule  Precondition/Indication  Comments/Instructions
  Codes

 

 TYLENOL(ACETAMINOPHEN) 500 MG TABLET

Dose: 500 MG

 

  ORAL

  Q4H

  PAIN OR FEVER

   

  TYLENOL (ACETAMINOPHEN) NDC: 10051382599

 



 

 Aspirin Ec(ASPIRIN) 81 MG TAB

Dose: 81 MG

 

  ORAL

  DAILY

   

   

  Aspirin 81 MG Delayed Release Oral Tablet (RxNorm): 740124

 

Aspirin Ec (ASPIRIN) NDC: 66106315190

 



 

 PROTONIX(PANTOPRAZOLE) 40 MG TAB

Dose: 40 MG

 

  ORAL

  DAILY

   

   

  pantoprazole 40 MG Delayed Release Oral Tablet [Protonix] (RxNorm): 512818

 

PROTONIX (PANTOPRAZOLE) NDC: 13523776921

 



 

 LANTUS PEN(INSULIN GLARGINE) 300 UNIT/3 ML INJECTION

Dose: 12 UNITS

 

  SUBCUTANEOUSLY

  AT BEDTIME

   

   

  3 ML Insulin Glargine 100 UNT/ML Pen Injector [Lantus] (RxNorm): 238882

 

LANTUS PEN (INSULIN GLARGINE) NDC: 98124557452

 



 

 LASIX(FUROSEMIDE) 40 MG TABLET

Dose: 160 MG

 

  ORAL

  DAILY

   

   

  Furosemide 40 MG Oral Tablet [Lasix] (RxNorm): 379410

 

LASIX (FUROSEMIDE) NDC: 75597665789

 



 

 LIVALO(PITAVASTATIN) 2 MG TABLET

Dose: 2 MG

 

  ORAL

  AT BEDTIME

   

   

  pitavastatin 2 MG Oral Tablet [Livalo] (RxNorm): 213333

 

LIVALO (PITAVASTATIN) NDC: 50209415343

 



 

 Coreg Cr(CARVEDILOL PHOSPHATE) 40 MG CPMP.24HR

Dose: 40 MG

 

  ORAL

  DAILY

   

   

  24 HR carvedilol phosphate 40 MG Extended Release Oral Capsule [Coreg] (RxNorm
): 734504

 

Coreg Cr (CARVEDILOL PHOSPHATE) NDC: 79589006410

 



 

 APRESOLINE(HydrALAZINE) 50 MG TAB

Dose: 100 MG

 

  ORAL

  3 TIMES A DAY

   

   

  Hydralazine Hydrochloride 50 MG Oral Tablet (RxNorm): 593899

 

APRESOLINE (HydrALAZINE) NDC: 02310897500

 



 

 Prinivil(LISINOPRIL) 40 MG TABLET

Dose: 40 MG

 

  ORAL

  DAILY

   

   

  Lisinopril 40 MG Oral Tablet (RxNorm): 754381

 

Prinivil (LISINOPRIL) NDC: 73300300536

 









History Of Encounters







 Encounters 

 

 Visit/Account #[id number] (2017 3:54pm - 2017 2:40pm) 

 

 Account Status  Physican Of Record  Reason For Visit  Visit Diagnosis  Start 
Date/Time  Stop Date/Time

 

 [facility]

  GEE BROOKS MD

  END STAGE RENAL FAILURE

  I87.1: COMPRESSION OF VEIN ICD10

  Aug 23, 2017 3:54pm

  Aug 23, 2017 3:54pm



 

 [place]

  GEE BROOKS MD

  END STAGE RENAL FAILURE

  I87.1: COMPRESSION OF VEIN ICD10

  Aug 23, 2017 7:32pm

  Aug 24, 2017 2:40pm









History of Procedures







 Procedure List 









 No procedures recorded.









Discharge Instructions







 Discharge Instructions

 

 Visit/Account #[id number] (2017 3:54pm - 2017 2:40pm)

 

 DISCHARGE INSTRUCTIONS Physician Documentation

 

PROVIDER INSTRUCTIONS

 

Discharge Activity/Weight Bearing Status As tolerated

 

Other Discharge Instructions Elevate right upper extremity above heart as much 
as possible

 

WOUND/INCISION/CATHETER CARE

 

Incision/Wound Care Wrap right upper extremity with ace wrap daily from hand to 
armpit.  Do

this for 2 weeks No showers for 2 weeks due to dialysis catheter

 

REASON TO CALL PROVIDER

 

Provider Dr. Brooks

 

FOLLOW UP APPOINTMENTS

 

 

Follow Up Appointment Date/Time: Dr Gee Brooks [phone]) as needed.

 









Social History







 Social History 









 No Social History Data.









Immunizations







 Immunizations

 

 Patient Unit Number: [id number]

 

 Immunizations

 

 No immunizations recorded.

## 2019-03-03 NOTE — XMS REPORT
[facility].

 Created on: 2015



Ingrid Riojas

External Reference #: [id number]

: 1960

Sex: Female



Demographics







 Address  [address]  [address]

 

 Home Phone  [phone]

 

 Preferred Language  Unknown

 

 Marital Status  Unknown

 

 [employer] Affiliation  Unknown

 

 Race  White

 

 Ethnic Group  Not  or 





Author







 Author  Kimi Corrales

 

 [facility]  [employer]

 

 Address  Unknown

 

 Phone  Unavailable







Care Team Providers







 Care Team Member Name  Role  Phone

 

 Kimi Corrales    Unavailable



                                                                



Allergies

          No Known Allergies                                                   
                                     



Problems

          





 Problem Type  Condition  Code  Onset Dates  Condition Status

 

 Problem  Diabetes Mellitus Type 2, not stated as uncontrolled  250.00     
Active

 

 Problem  Hypertension, benign  401.1     Active

 

 Problem  Hypertensive retinopathy  362.11     Active



                                                                               
                             



Medications

          





 Medication  Code System  Code  Instructions  Start Date  End Date  Status  
Dosage

 

 Lisinopril-Hydrochlorothiazide  NDC  [address]-44  20-25 MG Orally Once a day 
*Needs appointment before next refill*  Dec 17, 2014        1 tablet

 

 Reglan  NDC  [address]-59  10 MG Orally Twice every day as needed  2015        1 tablet 30 minutes before meals and at bedtime as needed



                                                                               
         



Results

          No Known Results                                                     
               



Summary Purpose

          [employer] Submission

## 2019-03-03 NOTE — XMS REPORT
[facility] Continuity Of Care Document

 Created on: 2016



KIMBERLY KINCAID

External Reference #: [id number]

: 1960

Sex: Female



Demographics







 Address  [address] 

[address]  [address]

 

 Home Phone  [phone]

 

 Preferred Language  Unknown

 

 Marital Status  

 

 Evangelical Affiliation  Unknown

 

 Race  White

 

 Ethnic Group  Unknown





Author







 Author  [facility]

 

 Organization  [facility]

 

 Address  [address] [place]  [address]



 

 Phone  [phone]







Support







 Name  Relationship  Address  Phone

 

 OLIVIA KINCAID  Next Of Kin  [address]  [address]  [phone]

 

 OLIVIA KINCAID  ECON  [address]  [address]  [phone]







Care Team Providers







 Care Team Member Name  Role  Phone

 

 AMARI OBRIEN, LEWIS NORMAN  PP  [phone]

 

 ALEXANDRIA CARMONA MD  CP  [phone]

 

 UNASSIGNED, ED PHYSICIAN  Unavailable  Unavailable

 

 ADDIS OBRIEN, ALFREDO T  CP  [phone]

 

 JEWEL OBRIEN, BRIAN JOHANSEN  CP  [phone]

 

 CESARIO OBRIEN, CORINNA [place]  AT  [phone]







Results







 Lab Results 

 

 Visit/Account #[id number] (2016 6:30pm - 2016 4:
27pm) 

 

 Test  Result  Date/Time

 

 POCGL 

 

 POCGL( MG/DL)

  199 MG/DL

  2016 11:00pm











 207 MG/DL

  February 15, 2016 6:50am



 

 189 MG/DL

  February 15, 2016 11:09am



 

 215 MG/DL

  February 15, 2016 4:06pm



 

 313 MG/DL

  February 15, 2016 8:15pm



 

 292 MG/DL

  February 15, 2016 9:17pm



 

 134 MG/DL

  2016 6:36am



 

 160 MG/DL

  2016 10:59am



 

 256 MG/DL

  2016 4:09pm



 

 295 MG/DL

  2016 9:06pm



 

 122 MG/DL

  2016 6:14am



 

 201 MG/DL

  2016 11:06am



 

 190 MG/DL

  2016 4:40pm



 

 188 MG/DL

  2016 8:47pm



 

 203 MG/DL

  2016 6:03am



 

 184 MG/DL

  2016 10:45am











 62617-3: COMPLETE BLOOD COUNT WITH DIFF 

 

 WHITE BLOOD COUNT(4.0-11.0 10E3/UL)

  7.2 10E3/UL

  2016 6:45pm











 9.3 10E3/UL

  February 15, 2016 6:44am



 

 8.1 10E3/UL

  2016 7:20am



 

 6.6 10E3/UL

  2016 6:25am



 

 6.1 10E3/UL

  2016 6:45am











 RED BLOOD COUNT(4.00-5.20 10E6/UL)

  3.34 10E6/UL

  2016 6:45pm











 3.18 10E6/UL

  February 15, 2016 6:44am



 

 3.05 10E6/UL

  2016 7:20am



 

 3.14 10E6/UL

  2016 6:25am



 

 2.93 10E6/UL

  2016 6:45am











 HEMOGLOBIN(12.0-16.0 G/DL)

  9.3 G/DL

  2016 6:45pm











 8.8 G/DL

  February 15, 2016 6:44am



 

 8.6 G/DL

  2016 7:20am



 

 8.8 G/DL

  2016 6:25am



 

 8.1 G/DL

  2016 6:45am











 HEMATOCRIT(36.0-46.0 %)

  29.2 %

  2016 6:45pm











 27.6 %

  February 15, 2016 6:44am



 

 27.0 %

  2016 7:20am



 

 27.5 %

  2016 6:25am



 

 25.5 %

  2016 6:45am











 MEAN CORPUSCULAR VOLUME(82.0-100.0 FL)

  87.4 FL

  2016 6:45pm











 86.8 FL

  February 15, 2016 6:44am



 

 88.5 FL

  2016 7:20am



 

 87.6 FL

  2016 6:25am



 

 87.0 FL

  2016 6:45am











 70468-1: MEAN CORPUSCULAR HEMOGLOBIN(26.0-34.0 PG)

  27.8 PG

  2016 6:45pm











 27.7 PG

  February 15, 2016 6:44am



 

 28.2 PG

  2016 7:20am



 

 28.0 PG

  2016 6:25am



 

 27.6 PG

  2016 6:45am











 MEAN CORPUSCULAR HGB CONC(31.5-36.5 G/DL)

  31.8 G/DL

  2016 6:45pm











 31.9 G/DL

  February 15, 2016 6:44am



 

 31.9 G/DL

  2016 7:20am



 

 32.0 G/DL

  2016 6:25am



 

 31.8 G/DL

  2016 6:45am











 RED CELL DISTRIBUTION WIDTH(11.5-14.5 %)

  13.4 %

  2016 6:45pm











 13.3 %

  February 15, 2016 6:44am



 

 13.8 %

  2016 7:20am



 

 13.5 %

  2016 6:25am



 

 13.3 %

  2016 6:45am











 777-3: PLATELET COUNT(150-450 10E3/UL)

  202 10E3/UL

  2016 6:45pm











 205 10E3/UL

  February 15, 2016 6:44am



 

 195 10E3/UL

  2016 7:20am



 

 173 10E3/UL

  2016 6:25am



 

 157 10E3/UL

  2016 6:45am











 MEAN PLATELET VOLUME(8.2-12.4 FL)

  11.7 FL

  2016 6:45pm











 12.1 FL

  February 15, 2016 6:44am



 

 12.0 FL

  2016 7:20am



 

 11.6 FL

  2016 6:25am



 

 11.4 FL

  2016 6:45am











 770-8: NEUTROPHILS % (AUTO)(40-70 %)

  85 %

  2016 6:45pm











 83 %

  February 15, 2016 6:44am



 

 60 %

  2016 7:20am



 

 54 %

  2016 6:25am



 

 57 %

  2016 6:45am











 LYMPHOCYTES % (AUTO)(15-45 %)

  11 %

  2016 6:45pm











 13 %

  February 15, 2016 6:44am



 

 30 %

  2016 7:20am



 

 32 %

  2016 6:25am



 

 29 %

  2016 6:45am











 5905-5: MONOCYTES % (AUTO)(2-10 %)

  3 %

  2016 6:45pm











 4 %

  February 15, 2016 6:44am



 

 8 %

  2016 7:20am



 

 11 %

  2016 6:25am



 

 9 %

  2016 6:45am











 713-8: EOSINOPHILS % (AUTO)(0-6 %)

  1 %

  2016 6:45pm











 0 %

  February 15, 2016 6:44am



 

 1 %

  2016 7:20am



 

 3 %

  2016 6:25am



 

 4 %

  2016 6:45am











 706-2: BASOPHILS % (AUTO)(0-1 %)

  0 %

  2016 6:45pm











 0 %

  February 15, 2016 6:44am



 

 1 %

  2016 7:20am



 

 1 %

  2016 6:25am



 

 1 %

  2016 6:45am











 97967-4: IMMATURE GRANS % (AUTO)(0-0 %)

  0 %

  2016 6:45pm











 1 %

  February 15, 2016 6:44am



 

 0 %

  2016 7:20am



 

 0 %

  2016 6:25am



 

 0 %

  2016 6:45am











 NUCLEATED RBCS (AUTO)(0-0 %)

  0 %

  2016 6:45pm











 0 %

  February 15, 2016 6:44am



 

 0 %

  2016 7:20am



 

 0 %

  2016 6:25am



 

 0 %

  2016 6:45am











 751-8: NEUTROPHILS # (AUTO)(2.5-7.5 10E3/UL)

  6.1 10E3/UL

  2016 6:45pm











 7.7 10E3/UL

  February 15, 2016 6:44am



 

 4.8 10E3/UL

  2016 7:20am



 

 3.6 10E3/UL

  2016 6:25am



 

 3.5 10E3/UL

  2016 6:45am











 94032-7: LYMPHOCYTES # (AUTO)(1.0-4.0 10E3/UL)

  0.8 10E3/UL

  2016 6:45pm











 1.2 10E3/UL

  February 15, 2016 6:44am



 

 2.4 10E3/UL

  2016 7:20am



 

 2.1 10E3/UL

  2016 6:25am



 

 1.8 10E3/UL

  2016 6:45am











 742-7: MONOCYTES # (AUTO)(0.2-0.8 10E3/UL)

  0.2 10E3/UL

  2016 6:45pm











 0.3 10E3/UL

  February 15, 2016 6:44am



 

 0.7 10E3/UL

  2016 7:20am



 

 0.7 10E3/UL

  2016 6:25am



 

 0.6 10E3/UL

  2016 6:45am











 711-2: EOSINOPHILS # (AUTO)(0.0-0.4 10E3/UL)

  0.1 10E3/UL

  2016 6:45pm











 0.0 10E3/UL

  February 15, 2016 6:44am



 

 0.1 10E3/UL

  2016 7:20am



 

 0.2 10E3/UL

  2016 6:25am



 

 0.3 10E3/UL

  2016 6:45am











 704-7: BASOPHILS # (AUTO)(0.0-0.2 10E3/UL)

  0.0 10E3/UL

  2016 6:45pm











 0.0 10E3/UL

  February 15, 2016 6:44am



 

 0.0 10E3/UL

  2016 7:20am



 

 0.0 10E3/UL

  2016 6:25am



 

 0.0 10E3/UL

  2016 6:45am











 IMMATURE GRANS # (AUTO)(0.0-0.0 10E3/UL)

  0.0 10E3/UL

  2016 6:45pm











 0.1 10E3/UL

  February 15, 2016 6:44am



 

 0.0 10E3/UL

  2016 7:20am



 

 0.0 10E3/UL

  2016 6:25am



 

 0.0 10E3/UL

  2016 6:45am











 DIFF TYPE 

  AUTOMATED

  2016 6:45pm











 AUTOMATED

  February 15, 2016 6:44am



 

 AUTOMATED

  2016 7:20am



 

 AUTOMATED

  2016 6:25am



 

 AUTOMATED

  2016 6:45am











 RETIC PANEL 

 

 81241-8: RETICULOCYTE %(0.2-2.0 %)

  2.3 %

  February 15, 2016 6:32am



 

 ABSOLUTE RETICS #(16-78 10E9/L)

  71 10E9/L

  February 15, 2016 6:32am



 

 15299-1: IMMATURE RETIC FRACTION(3.0-15.9 %)

  27.8 %

  February 15, 2016 6:32am



 

 59038-5: PROTHROMBIN TIME WITH INR 

 

 PROTHROMBIN TIME(12.1-14.0 SEC)

  14.7 SEC

  February 15, 2016 6:44am



 

 89396-8: INR 

  1.20



Result Comments:



       

                INR reference interval applies to patients   

                   on anticoagulant therapy. Suggested INR   

                   therapeutic range for oral anticoagulant  

                   therapy: (Stabilized anticoagulated       

                   patients)  

  

                Routine Therapy:                  2.0 to 3.0  

                Recurrent Myocardial Infarction:  2.5 to 3.5  

                Mechanical Prosthetic Valves:     2.5 to 3.5

  February 15, 2016 6:44am



 

 UA WITH SCREEN FOR CULTURE 

 

 5778-6: COLOR,URINE 

  YELLOW

  2016 12:25am



 

 85274-8: CLARITY,URINE 

  SLIGHTLY CLOUDY

  2016 12:25am



 

 GLUCOSE, URINE(NEGATIVE MG/DL)

  100 MG/DL

  2016 12:25am



 

 URINE BILIRUBIN(NEGATIVE)

  NEGATIVE

  2016 12:25am



 

 49721-6: KETONES,URINE(NEGATIVE MG/DL)

  NEGATIVE MG/DL

  2016 12:25am



 

 2965-2: URINE SPECIFIC GRAVITY(1.001-1.035)

  1.015

  2016 12:25am



 

 00238-2: URINE BLOOD(NEGATIVE)

  SMALL

  2016 12:25am



 

 2756-5: URINE PH(5.0-9.0)

  6.5

  2016 12:25am



 

 URINE PROTEIN(Less than 20 MG/DL)

  100 MG/DL

  2016 12:25am



 

 URINE UROBILINOGEN(0.2-1.0 MG/DL)

  0.2 MG/DL

  2016 12:25am



 

 URINE NITRITE(NEGATIVE)

  NEGATIVE

  2016 12:25am



 

 5799-2: LEUKOCYTE ESTERASE ,URINE(NEGATIVE)

  NEGATIVE

  2016 12:25am



 

 630-4: URINE CULTURE 

  NOT INDICATED

  2016 12:25am



 

 URINE MICROSCOPIC REQUIRED 

  YES

  2016 12:25am



 

 URINE WBCS(/HPF)

  0-3 /HPF

  2016 12:25am



 

 05508-1: URINE RBCS(/HPF)

  0-3 /HPF

  2016 12:25am



 

 38253-3: URINE EPITHELIAL CELLS(/HPF)

  0-3 /HPF

  2016 12:25am



 

 BACTERIA,URINE(/HPF)

  TRACE /HPF

  2016 12:25am



 

 URINE CRYSTALS(/HPF)

  NONE SEEN /HPF

  2016 12:25am



 

 URINE CASTS(/LPF)

  NONE SEEN /LPF

  2016 12:25am



 

 URINE COMMENTS 

  NONE

  2016 12:25am



 

 35828-8: COMPLETE METABOLIC PROFILE 

 

 GLUCOSE( MG/DL)

  210 MG/DL

  February 15, 2016 6:44am











 132 MG/DL

  2016 7:20am



 

 127 MG/DL

  2016 6:25am



 

 190 MG/DL

  2016 6:45am











 BLOOD UREA NITROGEN(6-20 MG/DL)

  39 MG/DL

  February 15, 2016 6:44am











 44 MG/DL

  2016 7:20am



 

 39 MG/DL

  2016 6:25am



 

 36 MG/DL

  2016 6:45am











 CREATININE(0.50-1.20 MG/DL)

  1.59 MG/DL

  February 15, 2016 6:44am











 1.75 MG/DL

  2016 7:20am



 

 1.47 MG/DL

  2016 6:25am



 

 1.33 MG/DL

  2016 6:45am











 30360-9: EST GLOMERULAR FILTRATION RATE(Greater than or equal to 60)

  34



Result Comments:



If the patient is of -American descent/extraction 

multiply the eGFR value by 1.212 to obtain the actual eGFR.  

  

>=60  mg/dL    Normal  

 30-59 mg/dL    Moderate Kidney Disease  

 15-29 mg/dL    Severe Kidney Disease  

<15   mg/dL    Kidney Failure

  February 15, 2016 6:44am











 30



Result Comments:



If the patient is of -American descent/extraction 

multiply the eGFR value by 1.212 to obtain the actual eGFR.  

  

>=60  mg/dL    Normal  

 30-59 mg/dL    Moderate Kidney Disease  

 15-29 mg/dL    Severe Kidney Disease  

<15   mg/dL    Kidney Failure

  2016 7:20am



 

 37



Result Comments:



If the patient is of -American descent/extraction 

multiply the eGFR value by 1.212 to obtain the actual eGFR.  

  

>=60  mg/dL    Normal  

 30-59 mg/dL    Moderate Kidney Disease  

 15-29 mg/dL    Severe Kidney Disease  

<15   mg/dL    Kidney Failure

  2016 6:25am



 

 41



Result Comments:



If the patient is of -American descent/extraction 

multiply the eGFR value by 1.212 to obtain the actual eGFR.  

  

>=60  mg/dL    Normal  

 30-59 mg/dL    Moderate Kidney Disease  

 15-29 mg/dL    Severe Kidney Disease  

<15   mg/dL    Kidney Failure

  2016 6:45am











 BUN CREATININE RATIO(10.0-20.0 RATIO)

  25.0 RATIO

  February 15, 2016 6:44am











 25.0 RATIO

  2016 7:20am



 

 27.0 RATIO

  2016 6:25am



 

 27.0 RATIO

  2016 6:45am











 SODIUM(135-145 MMOL/L)

  139 MMOL/L

  February 15, 2016 6:44am











 140 MMOL/L

  2016 7:20am



 

 140 MMOL/L

  2016 6:25am



 

 137 MMOL/L

  2016 6:45am











 POTASSIUM(3.6-5.0 MMOL/L)

  4.2 MMOL/L

  February 15, 2016 6:44am











 3.8 MMOL/L

  2016 7:20am



 

 3.3 MMOL/L

  2016 6:25am



 

 3.3 MMOL/L

  2016 6:45am











 CHLORIDE(101-111 MMOL/L)

  105 MMOL/L

  February 15, 2016 6:44am











 104 MMOL/L

  2016 7:20am



 

 101 MMOL/L

  2016 6:25am



 

 103 MMOL/L

  2016 6:45am











 8-9: CO2(21-31 MMOL/L)

  24 MMOL/L

  February 15, 2016 6:44am











 28 MMOL/L

  2016 7:20am



 

 31 MMOL/L

  2016 6:25am



 

 29 MMOL/L

  2016 6:45am











 33037-3: ANION GAP(8-18)

  14

  February 15, 2016 6:44am











 12

  2016 7:20am



 

 11

  2016 6:25am



 

 8

  2016 6:45am











 OSMO CALCULATED(270.0-290.0)

  293.1

  February 15, 2016 6:44am











 292.4

  2016 7:20am



 

 290.4

  2016 6:25am



 

 287.2

  2016 6:45am











 CALCIUM(8.5-10.5 MG/DL)

  8.5 MG/DL

  February 15, 2016 6:44am











 8.3 MG/DL

  2016 7:20am



 

 8.3 MG/DL

  2016 6:25am



 

 7.7 MG/DL

  2016 6:45am











 BILIRUBIN,TOTAL(0.1-1.2 MG/DL)

  0.2 MG/DL

  February 15, 2016 6:44am











 0.2 MG/DL

  2016 7:20am



 

 0.1 MG/DL

  2016 6:25am



 

 0.2 MG/DL

  2016 6:45am











 ALKALINE PHOSPHATASE( IU/L)

  83 IU/L

  February 15, 2016 6:44am











 78 IU/L

  2016 7:20am



 

 71 IU/L

  2016 6:25am



 

 68 IU/L

  2016 6:45am











 ASPARTATE AMINO TRANSFERASE(10-42 IU/L)

  15 IU/L

  February 15, 2016 6:44am











 13 IU/L

  2016 7:20am



 

 12 IU/L

  2016 6:25am



 

 13 IU/L

  2016 6:45am











 ALANINE AMINOTRANSFERASE(10-60 IU/L)

  15 IU/L

  February 15, 2016 6:44am











 13 IU/L

  2016 7:20am



 

 11 IU/L

  2016 6:25am



 

 12 IU/L

  2016 6:45am











 31494-3: TOTAL PROTEIN(6.4-8.2 G/DL)

  6.4 G/DL

  February 15, 2016 6:44am











 6.3 G/DL

  2016 7:20am



 

 6.0 G/DL

  2016 6:25am



 

 5.5 G/DL

  2016 6:45am











 ALBUMIN(3.5-5.5 G/DL)

  2.8 G/DL

  February 15, 2016 6:44am











 2.9 G/DL

  2016 7:20am



 

 2.7 G/DL

  2016 6:25am



 

 2.6 G/DL

  2016 6:45am











 2336-6: GLOBULIN(2.4-3.6)

  3.6

  February 15, 2016 6:44am











 3.4

  2016 7:20am



 

 3.3

  2016 6:25am



 

 2.9

  2016 6:45am











 ALBUMIN/GLOBULIN RATIO(0.9-1.8 RATIO)

  0.8 RATIO

  February 15, 2016 6:44am











 0.9 RATIO

  2016 7:20am



 

 0.8 RATIO

  2016 6:25am



 

 0.9 RATIO

  2016 6:45am











 CREATININE 

 

 CREATININE(0.50-1.20 MG/DL)

  1.50 MG/DL

  2016 6:45pm



 

 54379-0: EST GLOMERULAR FILTRATION RATE(Greater than or equal to 60)

  36



Result Comments:



If the patient is of -American descent/extraction 

multiply the eGFR value by 1.212 to obtain the actual eGFR.  

  

>=60  mg/dL    Normal  

 30-59 mg/dL    Moderate Kidney Disease  

 15-29 mg/dL    Severe Kidney Disease  

<15   mg/dL    Kidney Failure

  2016 6:45pm



 

 LACTATE DEHYDROGENASE 

 

 LACTATE DEHYDROGENASE( IU/L)

  224 IU/L

  2016 11:30am



 

 TOTAL CPK 

 

 TOTAL CPK( IU/L)

  331 IU/L

  February 15, 2016 12:57am











 276 IU/L

  February 15, 2016 6:44am











 CPK MB 

 

 CPK MB(0.6-6.3 NG/ML)

  7.4 NG/ML

  February 15, 2016 12:57am











 5.9 NG/ML

  February 15, 2016 6:44am











 89823-2: CARDIAC TROPONIN I 

 

 56940-3: CARDIAC TROPONIN I(0.01-0.04 NG/ML)

  1.62 NG/ML



Result Comments:



REFERENCE RANGES:  

    NEGATIVE                             < 0.04 NG/ML  

    POSSIBLE MYCARDIAL INVOLVEMENT     >/=0.04 NG/ML   

  

INTERPRET TROPONIN I RESULT IN LIGHT OF THE TOTAL CLINICAL 

PRESENTATION INCLUDING CLINICAL HISTORY.  ANY CONDITION 

RESULTING IN MYOCARDIAL INJURY CAN POTENTIALLY ELEVATE 

TROPONIN I LEVELS ABOVE EXPECTED NORMAL RANGES.  

  

** NOTE NEW REFERENCE RANGE **

  2016 6:45pm











 1.64 NG/ML



Result Comments:



REFERENCE RANGES:  

    NEGATIVE                             < 0.04 NG/ML  

    POSSIBLE MYCARDIAL INVOLVEMENT     >/=0.04 NG/ML   

  

INTERPRET TROPONIN I RESULT IN LIGHT OF THE TOTAL CLINICAL 

PRESENTATION INCLUDING CLINICAL HISTORY.  ANY CONDITION 

RESULTING IN MYOCARDIAL INJURY CAN POTENTIALLY ELEVATE 

TROPONIN I LEVELS ABOVE EXPECTED NORMAL RANGES.  

  

** NOTE NEW REFERENCE RANGE **

  February 15, 2016 12:57am



 

 1.67 NG/ML



Result Comments:



REFERENCE RANGES:  

    NEGATIVE                             < 0.04 NG/ML  

    POSSIBLE MYCARDIAL INVOLVEMENT     >/=0.04 NG/ML   

  

INTERPRET TROPONIN I RESULT IN LIGHT OF THE TOTAL CLINICAL 

PRESENTATION INCLUDING CLINICAL HISTORY.  ANY CONDITION 

RESULTING IN MYOCARDIAL INJURY CAN POTENTIALLY ELEVATE 

TROPONIN I LEVELS ABOVE EXPECTED NORMAL RANGES.  

  

** NOTE NEW REFERENCE RANGE **

  February 15, 2016 6:44am











 29053-2: BETA NATRIURETIC PEPTIDE 

 

 45568-7: BETA NATRIURETIC PEPTIDE(0-100 PG/ML)

  904 PG/ML

  2016 6:48pm



 

 65947-4: LIPID PROFILE 

 

 59158-9: TRIGLYCERIDES( MG/DL)

  125 MG/DL

  February 15, 2016 6:44am



 

 CHOLESTEROL(0-200 MG/DL)

  171 MG/DL

  February 15, 2016 6:44am



 

 LDL CHOLESTEROL,DIRECT(0-99 MG/DL)

  95 MG/DL

  February 15, 2016 6:44am



 

 VLDL CHOLESTEROL(1-53 MG/DL)

  25 MG/DL

  February 15, 2016 6:44am



 

 HDL CHOLESTEROL(35-85 MG/DL)

  38 MG/DL

  February 15, 2016 6:44am



 

 CHOL/HDL RATIO(0.0-4.4 RATIO)

  4.5 RATIO

  February 15, 2016 6:44am



 

 THYROID STIMULATING HORMONE 

 

 THYROID STIMULATING HORMONE(0.340-5.600 uIU/ML)

  1.320 uIU/ML

  2016 6:45pm



 

 2344-0: FLUID GLUCOSE 

 

 2344-0: FLUID GLUCOSE(MG/DL)

  163 MG/DL

  2016 10:40am



 

 FLUID LDH 

 

 FLUID LDH(IU/L)

  56 IU/L

  2016 10:40am



 

 2748-2: FLUID PH 

 

 2748-2: FLUID PH 

  7.577



Result Comments:



SPECIMEN WAS NOT RECEIVED ON ICE;RESULTS MAY BE AFFECTED   

 ---  16 1120 ---  

CORRECTED REPORT [facility] previously reported as:  

  7.577   

   

  2016 10:40am



 

 2881-1: FLUID TOTAL PROTEIN 

 

 2881-1: FLUID TOTAL PROTEIN(G/DL)

  Less than  3.0 G/DL

  2016 10:40am



 

 BODY FLUID CELL COUNT W/ DIFF 

 

 SPECIMEN DESCRIPTION 

  PLEURAL FLUID

  2016 10:40am



 

 6824-7: BODY FLUID COLOR 

  YELLOW

  2016 10:40am



 

 BODY FLUID CLARITY 

  SLIGHTLY CLOUDY

  2016 10:40am



 

 26466-3: BODY FLUID WBC(/MM3)

  325 /MM3

  2016 10:40am



 

 15084-6: BODY FLUID RBC(/MM3)

  Less than  3000 /MM3



Result Comments:



 ---  16 1132 ---  

CORRECTED REPORT RBC (AUTO) previously reported as:  

  2000 /MM3  

   

  2016 10:40am



 

 BODY FLUID NEUTROPHILS(%)

  34 %

  2016 10:40am



 

 BODY FLUID LYMPHOCYTES(%)

  37 %

  2016 10:40am



 

 43296-5: BODY FLUID MONOCYTES(%)

  29 %

  2016 10:40am













 Microbiology Results 

 

 Visit/Account #[id number] (2016 6:30pm - 2016 4:
27pm) 

 

 Procedure  Result

 

 611-4: CULTURE BODY FLUID 

 



 611-4: CULTURE BODY FLUID



 

Result Instance On 2016 10:40am 

Source: PLEURAL FLUID





Special Result Comments: 



No growth



 

 48287-3: BODY FLUID GRAM STAIN 

 



 26367-2: BODY FLUID GRAM STAIN



 

Result Instance On 2016 10:40am 

Source: PLEURAL FLUID





Result Prompts: 



WBC/HPF                       5-10 

BACTERIA SEEN                 NO ORGANISM SEEN 













Allergies and Adverse Reactions







 Allergies and Adverse Reactions 

 

 Patient Unit Number: [id number] 









 Agent  Type  Reaction  Severity  Status

 

 PENICILLINS

  Drug Allergy

  RASH

  Moderate

  Active



 

 CODEINE

  Drug Allergy

  RASH

  Mild

  Active









Problem List







 Problem List 

 

 Visit/Account #[id number] (2016 6:30pm - 2016 4:
27pm) 

 

 Acute Problems:  

 

 Code/Condition  Comments  Documented Start Date  Documented Resolved Date  Code
(s)

 

  

Gastritis

   

   

   

   

ICD10: K29.70 Gastritis

ICD9: 535.50 Gastritis

SNOMED: 0707276 Gastritis



 

  

Acute kidney injury

   

   

   

   

ICD10: N17.9 Acute kidney injury

ICD9: 584.9 Acute kidney injury

SNOMED: 08035765 Acute kidney injury



 

  

Hypokalemia

   

   

   

   

ICD10: E87.6 Hypokalemia

ICD9: 276.8 Hypokalemia

SNOMED: 61297474 Hypokalemia



 

  

Elevated troponin

   

   

   

   

ICD10: R79.89 Elevated troponin level

ICD9: 790.6 Elevated troponin level

SNOMED: 224500379 Elevated troponin level



 

  

CHF (congestive heart failure)

   

   

   

   

ICD10: I50.9 CHF (congestive heart failure)

ICD9: 428.0 CHF (congestive heart failure)

SNOMED: 67319969 CHF (congestive heart failure)



 

  

NSTEMI (non-ST elevated myocardial infarction)

   

   

   

   

ICD10: I21.4 Non-ST elevation (NSTEMI) myocardial infarction

ICD9: 410.70 Non-ST elevation (NSTEMI) myocardial infarction

SNOMED: 528725525 Non-ST elevation (NSTEMI) myocardial infarction



 

  

Pulmonary edema

   

   

   

   

ICD10: J81.1 Pulmonary edema

ICD9: 514 Pulmonary edema

SNOMED: 36734017 Pulmonary edema



 

 Chronic Problems:  

 

  

New onset type 1 diabetes mellitus, uncontrolled

   

   

   

   

ICD10: E10.9 New onset type 1 diabetes mellitus, uncontrolled

ICD9: 250.03 New onset type 1 diabetes mellitus, uncontrolled

SNOMED: 807289529 New onset type 1 diabetes mellitus, uncontrolled



 

  

Hypertension

   

   

   

   

ICD10: I10 Hypertension

ICD9: 401.9 Hypertension

SNOMED: 80045714 Hypertension



 

  

Anemia, chronic disease

   

   

   

   

ICD10: D63.8 Chronic disease anemia

ICD9: 285.29 Chronic disease anemia

SNOMED: 672226216 Chronic disease anemia



 

  

Anemia

   

   

   

   

ICD10: D64.9 Anemia

ICD9: 285.9 Anemia

SNOMED: 055701848 Anemia



 

  

Diabetes mellitus

   

   

   

   

ICD10: E11.9 Diabetes mellitus

ICD9: 250.00 Diabetes mellitus

SNOMED: 86428311 Diabetes mellitus



 

 Resolved Problems:  

 

  

Pleural effusion

   

   

   

2016

   

ICD10: J94.8 Pleural effusion

ICD9: 511.9 Pleural effusion

SNOMED: 12897332 Pleural effusion









Plan of Care







 Plan Of Care 

 

 Visit/Account #[id number] (2016 6:30pm - 2016 4:
27pm) 

 

 Patient Instructions 

 

 Instructions

 

  Potassium

DI for Heart Attack

DI for Heart Failure

DI for Gastritis

DI for Hypokalemia

DI for Diabetes Type 1 -- Child

DI for Coronary Artery Disease

Rosuvastatin

Acetaminophen

Furosemide

Aspirin

Carvedilol

 









Vital Signs







 Vital Signs 

 

 Visit/Account #[id number] (2016 6:30pm - 2016 4:
27pm) 

 

 Sign  First Result  Last Result  Code(s)

 

 Blood Pressure

 

  190/ 103 mm[Hg] On 2016 9:10pm

  158/ 80 mm[Hg] On 2016 2:18pm

  8480-6  BP Systolic



 

 Heart Rate/Pulse

 

  Pulse Rate (adult):  93 /min On 2016 9:10pm

  Pulse Rate (adult):  73 /min On 2016 2:18pm

  8867-4  Heart Rate

 8893-0  Pulse rate



 

 Respiratory Rate

 

  Respiratory Rate:  20 /min On 2016 9:10pm

  Respiratory Rate:  20 /min On 2016 2:18pm

  9279-1  Respiratory rate



 

 Temperature in Fahrenheit

 

  Temperature (Fahrenheit):  98.0 [degF] On 2016 6:29pm

  Temperature (Fahrenheit):  98.4 [degF] On 2016 2:18pm

  8310-5  Body Temperature



 

 Weight in Kilograms

 

  Weight (Kilograms):  86.2 kg On 2016 6:29pm

   

  3141-9  Weight Measured

 43177-8  Body weight measured in kilograms









Functional Status







 Functional and Cognitive Status 









 No Functional Status Data









Medications







 Home Medications - Medications that the patient was taking prior to arrival at 
the hospital 

 

 Visit/Account #[id number] (2016 6:30pm - 2016 4:
27pm) 

 

 Medication  Route  Sig/Schedule  Precondition/Indication  Comments/Instructions
  Codes

 

 HUMALOG(INSULIN LISPRO) 100 U/ML INSULN.PEN

Dose: 8 U

 

  SUB-Q

  3 TIMES DAILY WITH MEALS

   

   

  3 ML Insulin Lispro 100 UNT/ML Pen Injector [Humalog] (RxNorm): 0975733

 

HUMALOG (INSULIN LISPRO) NDC: 28363063400

 



 

 CRESTOR(ROSUVASTATIN CALCIUM) 10 MG TAB

Dose: 5 MG

 

  ORAL

  DAILY

   

   

  Rosuvastatin calcium 10 MG Oral Tablet [Crestor] (RxNorm): 378526

 

CRESTOR (ROSUVASTATIN CALCIUM) NDC: 96836658040

 



 

 NAPROSYN(NAPROXEN) 500 MG TABLET

Dose: 500 MG

 

  ORAL

  TWICE A DAY

  PAIN

   

  Naproxen 500 MG Oral Tablet [Naprosyn] (RxNorm): 957975

 

NAPROSYN (NAPROXEN) NDC: 11113524629

 



 

 ZESTORETIC 20-25 MG(LISINOPRIL/HYDROCHLOROTHIAZIDE) 1 EACH TABLET

Dose: 2 TAB

 

  ORAL

  DAILY

   

   

  Hydrochlorothiazide 25 MG / Lisinopril 20 MG Oral Tablet [Zestoretic] (RxNorm)
: 134792

 

ZESTORETIC 20-25 MG (LISINOPRIL/HYDROCHLOROTHIAZIDE) NDC: 59704790841

 



 

 FISH OIL 1,000 MG(OMEGA-3 FATTY ACIDS/FISH OIL) 1 EACH CAPSULE

Dose: 1000 MG

 

  ORAL

  DAILY

   

   

  FISH OIL 1,000 MG (OMEGA-3 FATTY ACIDS/FISH OIL) NDC: 12789371902

 



 

 GLUCOPHAGE(MetFORMin HCL) 1 000 TABLET

Dose: 1000 MG

 

  ORAL

  DAILY AT [facility]

   

   

  Metformin hydrochloride 1000 MG Oral Tablet [Glucophage] (RxNorm): 639088

 

GLUCOPHAGE (MetFORMin HCL) NDC: 03576279381

 



 

 REGLAN(METOCLOPRAMIDE HCL) 10 MG TABLET

Dose: 10 MG

 

  ORAL

  TWICE A DAY

   

   

  Metoclopramide 10 MG Oral Tablet [Reglan] (RxNorm): 917876

 

REGLAN (METOCLOPRAMIDE HCL) NDC: 41138022599

 



 

 COREG(CARVEDILOL) 12.5 MG TABLET

Dose: 12.5 MG

 

  ORAL

  TWICE A DAY

   

   

  carvedilol 12.5 MG Oral Tablet [Coreg] (RxNorm): 512574

 

COREG (CARVEDILOL) NDC: 99552106584

 



 

 ZANTAC(RANITIDINE HCL) 150 MG TABLET

Dose: 150 MG

 

  ORAL

  AS NEEDED

   

   

  Ranitidine 150 MG Oral Tablet [Zantac] (RxNorm): 440715

 

ZANTAC (RANITIDINE HCL) NDC: 69872319006

 



 

 LEVEMIR FLEXTOUCH(INSULIN DETEMIR) 100 UNIT/1 ML INSULN.PEN

Dose: 12 UNIT

 

  SUBCUTANEOUSLY

  AT BEDTIME

   

   

  3 ML insulin detemir 100 UNT/ML Pen Injector [Levemir] (RxNorm): 237062

 

LEVEMIR FLEXTOUCH (INSULIN DETEMIR) NDC: [id number]

 



 

 Coreg(CARVEDILOL) 25 MG TAB

Dose: 25 MG

 

  ORAL

  WITH BREAKFAST & SUPPER

   

   

  carvedilol 25 MG Oral Tablet [Coreg] (RxNorm): 643011

 

Coreg (CARVEDILOL) NDC: 02790665282

 



 

 TYLENOL(ACETAMINOPHEN) 500 MG TAB

Dose: 500 MG

 

  ORAL

  Q4H

  PAIN OR FEVER

  Rx Instructions:

 500 - 1000 MG

 

  Acetaminophen 500 MG Oral Tablet [Mapap] (RxNorm): 138945

 

TYLENOL (ACETAMINOPHEN) NDC: 94878132743

 



 

 Aspirin Chew(ASPIRIN) 81 MG TAB

Dose: 81 MG

 

  ORAL

  DAILY

   

   

  Aspirin Chew (ASPIRIN) NDC: 43869402955

 



 

 Lasix(FUROSEMIDE) 40 MG TAB

Dose: 40 MG

 

  ORAL

  DAILY

   

   

  Lasix (FUROSEMIDE) NDC: 86115839524

 



 

 CRESTOR(ROSUVASTATIN CALCIUM) 10 MG TAB

Dose: 20 MG

 

  ORAL

  AT BEDTIME

   

   

  Rosuvastatin calcium 10 MG Oral Tablet [Crestor] (RxNorm): 526614

 

CRESTOR (ROSUVASTATIN CALCIUM) NDC: 79950423666

 



 

 Klor-Con M20(POTASSIUM CHLORIDE) 20 MEQ TAB.ER.PRT

Dose: 40 MEQ

 

  ORAL

  WITH BREAKFAST & SUPPER

   

   

  Potassium Chloride 20 MEQ Extended Release Oral Tablet [Klor-Con] (RxNorm): 
846554

 

Klor-Con M20 (POTASSIUM CHLORIDE) NDC: 76522555779

 













 Inpatient/Ordered Medications - Medications administered during hospital visit 

 

 Visit/Account #[id number] (2016 6:30pm - 2016 4:
27pm) 

 

 Medication  Route  Sig/Schedule  Precondition/Indication  Comments/Instructions
  Codes

 

 LASIX INJ(FUROSEMIDE) 40 MG/4 ML INJECTION

Dose: 4 ML

 

  INTRAVEN

  NOW

   

  Label Comments:

MAY INCREASE FALL RISK

 

  4 ML Furosemide 10 MG/ML Injection (RxNorm): 2701755

 

LASIX INJ (FUROSEMIDE) NDC: 24303702621

 



 

 NITROBID 2% OINT(NITROGLYCERIN) 1 GM OINTMENT

Dose: 1 GM

 

  TOPICALLY

  NOW

   

  Label Comments:

MAY INCREASE FALL RISK

 

Special Dose Instructions:

APPLY _1__ INCH TO CHEST WALL

 

  Nitroglycerin 0.02 MG/MG Topical Ointment [Nitro-Bid] (RxNorm): 452796

 

NITROBID 2% OINT (NITROGLYCERIN) NDC: 13145628381

 



 

 LOVENOX(ENOXAPARIN) 100 MG/ML INJECTION

Dose: 0.9 ML

 

  SUBCUTANEOUSLY

  NOW

   

  Label Comments:

INJECT SC INTO ABDOMINAL WALL ONLY.

DOSED AT __MG/KG BASED ON PT WEIGHT OF __KG AND

ROUNDED PER PROTOCOL.

 

  1 ML Enoxaparin sodium 100 MG/ML Prefilled Syringe [Lovenox] (RxNorm): 750379

 

LOVENOX (ENOXAPARIN) NDC: 23943409476

 



 

 IV Medication

 

Carriers:

 

INTEGRILIN(EPTIFIBATIDE) 0.75 MG/ML INJECTION

Dose: 100 ML

 

  INTRAVEN

  .[id number] (Rate: 6.896 MLS/HR Duration: 14 HR 31 MIN) 

 

   

  Label Comments:

PHARMACY TO VERIFY/CALCULATE RATE

 

  Carriers:

 

eptifibatide 0.75 MG/ML Injectable Solution [Integrilin] (RxNorm): 844698

 

INTEGRILIN (EPTIFIBATIDE) NDC: 20440665145

 



 

 COREG(CARVEDILOL) 25 MG TAB

Dose: 25 MG

 

  ORAL

  WITH BREAKFAST & SUPPER

   

  Label Comments:

MAY INCREASE FALL RISK

TAKE WITH FOOD

 

Special Dose Instructions:

Give dose tonight 16!

 

  carvedilol 25 MG Oral Tablet [Coreg] (RxNorm): 520757

 

COREG (CARVEDILOL) NDC: 74294782909

 



 

 LOVENOX(ENOXAPARIN) 100 MG/ML INJECTION

Dose: 0.9 ML

 

  SUBCUTANEOUSLY

  Q12H

   

   

  1 ML Enoxaparin sodium 100 MG/ML Prefilled Syringe [Lovenox] (RxNorm): 465121

 

LOVENOX (ENOXAPARIN) NDC: 76226802819

 



 

 ASPIRIN 81 MG TAB

Dose: 81 MG

 

  ORAL

  DAILY

   

   

  Aspirin 81 MG Chewable Tablet (RxNorm): 182232

 

 (ASPIRIN) NDC: 22899414491

 



 

 LEVEMIR(INSULIN DETEMIR) 1000 UNITS/10 ML INJECTION

Dose: 0.05 ML

 

  SUBCUTANEOUSLY

  AT BEDTIME

   

  Label Comments:

SUBST FOR LANTUS

Do NOT refrigerate. * Syringe expires in 24 hours.

 

  insulin detemir 100 UNT/ML Injectable Solution [Levemir] (RxNorm): 561583

 

LEVEMIR (INSULIN DETEMIR) NDC: 82305874671

 



 

 CRESTOR(ROSUVASTATIN CALCIUM) 10 MG TAB

Dose: 5 MG

 

  ORAL

  AT BEDTIME

   

   

  Rosuvastatin calcium 10 MG Oral Tablet [Crestor] (RxNorm): 533478

 

CRESTOR (ROSUVASTATIN CALCIUM) NDC: 72263228331

 



 

 LASIX INJ(FUROSEMIDE) 40 MG/4 ML INJECTION

Dose: 4 ML

 

  INTRAVEN

  NOW

   

  Label Comments:

MAY INCREASE FALL RISK

 

  4 ML Furosemide 10 MG/ML Injection (RxNorm): 5612882

 

LASIX INJ (FUROSEMIDE) NDC: 75565260726

 



 

 LASIX INJ(FUROSEMIDE) 100 MG/10 ML INJECTION

Dose: 8 ML

 

  INTRAVEN

  BID@,16

   

  Label Comments:

MAY INCREASE FALL RISK

 

  10 ML Furosemide 10 MG/ML Injection (RxNorm): 5056571

 

LASIX INJ (FUROSEMIDE) NDC: 58081373852

 



 

 K-DUR(POTASSIUM CHLORIDE) 20 MEQ TAB

Dose: 40 MEQ

 

  ORAL

  NOW

   

  Label Comments:

TAKE WITH FOOD TO AVOID GI UPSET

 

  Potassium Chloride 20 MEQ Extended Release Oral Tablet [Klor-Con] (RxNorm): 
520434

 

K-DUR (POTASSIUM CHLORIDE) NDC: 76398024560

 



 

 INTEGRILIN(EPTIFIBATIDE) 0.75 MG/ML INJECTION

Dose: 20.6667 ML

 

  INTRAVEN

  Q10M

   

  Label Comments:

CONC: 750 MCG/ML

Use Alaris pump to administer bolus. If double 

bolus is ordered, give the first bolus (max of 

 22.6 mg), wait 10 minutes, then give the second 

bolus.

 

  eptifibatide 0.75 MG/ML Injectable Solution [Integrilin] (RxNorm): 226984

 

INTEGRILIN (EPTIFIBATIDE) NDC: 82683139889

 



 

 IV Medication

 

Carriers:

 

NORMAL SALINE(SODIUM CHLORIDE) 1000 ML INJECTION

Dose: 1000 ML

 

  INTRAVEN

  .Q20H (Rate: 50 MLS/HR Duration: 20 HR) 

 

   

  Label Comments:

If no IV established 

 

  Carriers:

 

Sodium Chloride 0.154 MEQ/ML Injectable Solution (RxNorm): 200503

 

NORMAL SALINE (SODIUM CHLORIDE) NDC: 96961379821

 



 

 VERSED INJ(MIDAZOLAM HCL) 10 MG/10 ML INJECTION

Dose: 10 MG

 

  Route

  .[facility]

   

  Label Comments:

MAY INCREASE FALL RISK

 

  Midazolam 1 MG/ML Injectable Solution (RxNorm): 252976

 

VERSED INJ (MIDAZOLAM HCL) NDC: 00862949620

 



 

 CARAFATE(SUCRALFATE) 1 GM TAB

Dose: 1 GM

 

  ORAL

  BEFORE MEALS & AT BEDTIME

   

   

  Sucralfate 1000 MG Oral Tablet (RxNorm): 866071

 

CARAFATE (SUCRALFATE) NDC: 49544560052

 



 

 PROTONIX(PANTOPRAZOLE SOD) 40 MG TAB

Dose: 40 MG

 

  ORAL

  60 MIN BEFORE BKFST

   

   

  pantoprazole 40 MG Delayed Release Oral Tablet [Protonix] (RxNorm): 754519

 

PROTONIX (PANTOPRAZOLE SOD) NDC: 37782965061

 



 

 HumaLOG(INSULIN HUM LISPRO) 300 UNIT/3 ML INJECTION

Dose: 0 ML

 

  SUBCUTANEOUSLY

  SLIDING SCALE

  PRN Reason: HYPERGLYCEMIA

  Special Dose Instructions:

**STANDARD SCALE**

If BS less than 80 Call physician

If -200    Give 2 Units

If -250    Give 4 Units

If -300    Give 6 Units

If -350    Give 8 Units

If -400    Give 10 Units

If -450    Give 12 Units

If BS greater than 450 Call physician

DOCUMENT SITE OF INJECTION

 

  Insulin Lispro 100 UNT/ML Injectable Solution [Humalog] (RxNorm): 349755

 

HumaLOG (INSULIN HUM LISPRO) NDC: 42648587070

 



 

 K-DUR(POTASSIUM CHLORIDE) 20 MEQ TAB

Dose: 40 MEQ

 

  ORAL

  DAILY AT BKT

   

  Label Comments:

TAKE WITH FOOD TO AVOID GI UPSET

 

  Potassium Chloride 20 MEQ Extended Release Oral Tablet [Klor-Con] (RxNorm): 
798742

 

K-DUR (POTASSIUM CHLORIDE) NDC: 93000285244

 



 

 ZESTORETIC 20/25(HCTZ/LISINOPRIL) 1 TAB TAB

Dose: 2 TAB

 

  ORAL

  DAILY

   

  Label Comments:

MAY INCREASE FALL RISK

 

  Hydrochlorothiazide 25 MG / Lisinopril 20 MG Oral Tablet (RxNorm): 542071

 

ZESTORETIC 20/25 (HCTZ/LISINOPRIL) NDC: 50333707436

 



 

 TYLENOL(ACETAMINOPHEN) 500 MG TAB

Dose: 500 MG

 

  ORAL

  Q4H

  PRN Reason: PAIN OR FEVER

  Label Comments:

DO NOT EXCEED 4000 MG PER 24 HR

 

  Acetaminophen 500 MG Oral Tablet [Mapap] (RxNorm): 084663

 

TYLENOL (ACETAMINOPHEN) NDC: 54259460952

 



 

 LASIX(FUROSEMIDE) 40 MG TAB

Dose: 40 MG

 

  ORAL

  DAILY

   

  Label Comments:

MAY INCREASE FALL RISK

 

  Furosemide 40 MG Oral Tablet (RxNorm): 396814

 

LASIX (FUROSEMIDE) NDC: 78122385849

 













 Discharge Medications - Medications that patient should continue to take. 
Review with physician 

 

 Visit/Account #[id number] (2016 6:30pm - 2016 4:
27pm) 

 

 Medication  Route  Sig/Schedule  Precondition/Indication  Comments/Instructions
  Codes

 

 HUMALOG(INSULIN LISPRO) 100 U/ML INSULN.PEN

Dose: 8 U

 

  SUB-Q

  3 TIMES DAILY WITH MEALS

   

   

  3 ML Insulin Lispro 100 UNT/ML Pen Injector [Humalog] (RxNorm): 1540409

 

HUMALOG (INSULIN LISPRO) NDC: 21503514966

 



 

 ZESTORETIC 20-25 MG(LISINOPRIL/HYDROCHLOROTHIAZIDE) 1 EACH TABLET

Dose: 2 TAB

 

  ORAL

  DAILY

   

   

  Hydrochlorothiazide 25 MG / Lisinopril 20 MG Oral Tablet [Zestoretic] (RxNorm)
: 554815

 

ZESTORETIC 20-25 MG (LISINOPRIL/HYDROCHLOROTHIAZIDE) NDC: 94834997694

 



 

 FISH OIL 1,000 MG(OMEGA-3 FATTY ACIDS/FISH OIL) 1 EACH CAPSULE

Dose: 1000 MG

 

  ORAL

  DAILY

   

   

  FISH OIL 1,000 MG (OMEGA-3 FATTY ACIDS/FISH OIL) NDC: 05996637351

 



 

 REGLAN(METOCLOPRAMIDE HCL) 10 MG TABLET

Dose: 10 MG

 

  ORAL

  TWICE A DAY

   

   

  Metoclopramide 10 MG Oral Tablet [Reglan] (RxNorm): 347491

 

REGLAN (METOCLOPRAMIDE HCL) NDC: 42866417189

 



 

 ZANTAC(RANITIDINE HCL) 150 MG TABLET

Dose: 150 MG

 

  ORAL

  AS NEEDED

   

   

  Ranitidine 150 MG Oral Tablet [Zantac] (RxNorm): 615310

 

ZANTAC (RANITIDINE HCL) NDC: 94556061300

 



 

 LEVEMIR FLEXTOUCH(INSULIN DETEMIR) 100 UNIT/1 ML INSULN.PEN

Dose: 12 UNIT

 

  SUBCUTANEOUSLY

  AT BEDTIME

   

   

  3 ML insulin detemir 100 UNT/ML Pen Injector [Levemir] (RxNorm): 508527

 

LEVEMIR FLEXTOUCH (INSULIN DETEMIR) NDC: 02842522146

 



 

 Coreg(CARVEDILOL) 25 MG TAB

Dose: 25 MG

 

  ORAL

  WITH BREAKFAST & SUPPER

   

   

  carvedilol 25 MG Oral Tablet [Coreg] (RxNorm): 421302

 

Coreg (CARVEDILOL) NDC: 67864491239

 



 

 TYLENOL(ACETAMINOPHEN) 500 MG TAB

Dose: 500 MG

 

  ORAL

  Q4H

  PAIN OR FEVER

  Rx Instructions:

 500 - 1000 MG

 

  Acetaminophen 500 MG Oral Tablet [Mapap] (RxNorm): 248410

 

TYLENOL (ACETAMINOPHEN) NDC: 98917526827

 



 

 Aspirin Chew(ASPIRIN) 81 MG TAB

Dose: 81 MG

 

  ORAL

  DAILY

   

   

  Aspirin Chew (ASPIRIN) NDC: 07864636485

 



 

 Lasix(FUROSEMIDE) 40 MG TAB

Dose: 40 MG

 

  ORAL

  DAILY

   

   

  Lasix (FUROSEMIDE) NDC: 90258873099

 



 

 CRESTOR(ROSUVASTATIN CALCIUM) 10 MG TAB

Dose: 20 MG

 

  ORAL

  AT BEDTIME

   

   

  Rosuvastatin calcium 10 MG Oral Tablet [Crestor] (RxNorm): 349174

 

CRESTOR (ROSUVASTATIN CALCIUM) NDC: 68029489830

 



 

 Klor-Con M20(POTASSIUM CHLORIDE) 20 MEQ TAB.ER.PRT

Dose: 40 MEQ

 

  ORAL

  WITH BREAKFAST & SUPPER

   

   

  Potassium Chloride 20 MEQ Extended Release Oral Tablet [Klor-Con] (RxNorm): 
286173

 

Klor-Con M20 (POTASSIUM CHLORIDE) NDC: 08261653187

 









History Of Encounters







 Encounters 

 

 Visit/Account #[id number] (2016 6:30pm - 2016 4:
27pm) 

 

 Account Status  Physican Of Record  Reason For Visit  Visit Diagnosis  Start 
Date/Time  Stop Date/Time

 

 ER

  BRIAN HOLLOWAY MD

  NSTEMI,PULMONARY EDEMA

  Not Available

  2016 6:30pm

  2016 8:50pm



 

 IN

  CORINNA NASSAR MD

  NSTEMI,PULMONARY EDEMA

  Not Available

  2016 7:56pm

  2016 4:27pm









History of Procedures







 Procedure List 









 No procedures recorded.









Discharge Instructions







 Discharge Instructions

 

 Visit/Account #[id number] (2016 6:30pm - 2016 4:
27pm)

 

 DISCHARGE INSTRUCTIONS Physician Documentation

 

PROVIDER INSTRUCTIONS

 

Discharge Diet ADA  Mahamed

 

Discharge Activity/Weight Bearing Status Resume normal activity as tolerated

 

Other Discharge Instructions Tylenol for pain

Hold Metformin until health care provider states it os okay to resume

Avoid NSAIDs (naproxyn, ibuprofen, aleve) to protect kidneys

 

Discharge Diet ADA  Mahamed

 

Discharge Activity/Weight Bearing Status Resume normal activity as tolerated

 

Other Discharge Instructions Tylenol for pain

Hold Metformin until health care provider states it os okay to resume

Avoid NSAIDs (naproxyn, ibuprofen, aleve) to protect kidneys

 

WOUND/INCISION/CATHETER CARE

 

Incision/Wound Care Keep wounds clean and dry

 

REASON TO CALL PROVIDER

 

 

Notify Physician if: Symptoms reoccur or as needed

 

FOLLOW UP APPOINTMENTS

 

Follow Up With [facility] ([phone]) on Monday, 16 @ 
1100am

 

 

Follow-up tests/procedures/outpatient needs: CBC to assess Hgb CMP to assess 
Creatinine and Potassium

Weight check

 









Social History







 Social History 









 No Social History Data.









Immunizations







 Immunizations

 

 Patient Unit Number: [id number]

 

 Immunizations

 

 No immunizations recorded.

## 2019-03-03 NOTE — XMS REPORT
[facility], [place], [facility].

 Created on: 10/27/2015



Ingrid Riojas

External Reference #: [id number]

: 1960

Sex: Female



Demographics







 Address  [address] [place]  [address]

 

 Home Phone  [phone]

 

 Preferred Language  Unknown

 

 Marital Status  Unknown

 

 Nondenominational Affiliation  Unknown

 

 Race  White

 

 Ethnic Group  Not  or 





Author







 Kimi Horner

 

 [facility]  [employer]

 

 Address  Unknown

 

 Phone  Unavailable







Care Team Providers







 Care Team Member Name  Role  Phone

 

 Kimi Corrales    Unavailable



                                                                



Allergies

          No Known Allergies                                                   
                                     



Problems

          





 Problem Type  Condition  Code  Onset Dates  Condition Status

 

 Problem  Diabetes Mellitus Type 2, not stated as uncontrolled  250.00     
Active

 

 Problem  Hypertension, benign  401.1     Active

 

 Problem  Hypertensive retinopathy  362.11     Active



                                                                               
                             



Medications

          No Known Medications                                                 
                             



Results

          No Known Results                                                     
               



Summary Purpose

          [employer] Submission

## 2019-03-03 NOTE — XMS REPORT
[facility] Continuity Of Care Document

 Created on: 2016



KIMBERLY KINCAID

External Reference #: [id number]

: 1960

Sex: Female



Demographics







 Address  [address] 

[place]  [address]

 

 Home Phone  [phone]

 

 Preferred Language  Unknown

 

 Marital Status  

 

 Jainism Affiliation  Unknown

 

 Race  White

 

 Ethnic Group  Unknown





Author







 Author  [facility]

 

 Organization  [facility]

 

 Address  [address] [place]  [address]



 

 Phone  [phone]







Support







 Name  Relationship  Address  Phone

 

 OLIVIA KINCAID  Next Of Kin  [address] [place]  [address]  [phone]

 

 OLIVIA KINCAID  ECON  [address] [place] [address]  [address]  [phone]







Care Team Providers







 Care Team Member Name  Role  Phone

 

 DIMPLE GALVEZ MD  CP  [phone]

 

 UNASSIGNED, ED PHYSICIAN  Unavailable  Unavailable

 

 TWILA OBRIEN, HERMINIA LUQUE  AT  [phone]

 

 SILVA STEPHENS  PP  [phone]







Results







 Lab Results 

 

 Visit/Account #[id number] (2016 3:02am - 2016 12:58pm) 

 

 Test  Result  Date/Time

 

 POCGL 

 

 POCGL( MG/DL)

  148 MG/DL

  2016 6:36am











 155 MG/DL

  2016 10:55am



 

 173 MG/DL

  2016 4:32pm



 

 264 MG/DL

  2016 8:15pm



 

 136 MG/DL

  2016 6:21am



 

 191 MG/DL

  2016 11:36am



 

 147 MG/DL

  2016 5:22pm



 

 138 MG/DL

  2016 8:07pm



 

 89 MG/DL

  2016 5:39am



 

 186 MG/DL

  2016 10:18am



 

 122 MG/DL

  2016 3:34pm



 

 217 MG/DL

  2016 8:05pm



 

 100 MG/DL

  2016 5:49am



 

 167 MG/DL

  2016 10:45am











 57493-2: COMPLETE BLOOD COUNT WITH DIFF 

 

 WHITE BLOOD COUNT(4.0-11.0 10E3/UL)

  10.0 10E3/UL

  2016 3:20am











 5.7 10E3/UL

  2016 7:04am



 

 7.0 10E3/UL

  2016 6:24am



 

 6.6 10E3/UL

  2016 6:13am











 RED BLOOD COUNT(4.00-5.20 10E6/UL)

  3.54 10E6/UL

  2016 3:20am











 3.17 10E6/UL

  2016 7:04am



 

 3.33 10E6/UL

  2016 6:24am



 

 3.42 10E6/UL

  2016 6:13am











 HEMOGLOBIN(12.0-16.0 G/DL)

  9.6 G/DL

  2016 3:20am











 8.5 G/DL

  2016 7:04am



 

 8.8 G/DL

  2016 6:24am



 

 8.8 G/DL

  2016 6:13am











 HEMATOCRIT(36.0-46.0 %)

  30.4 %

  2016 3:20am











 26.9 %

  2016 7:04am



 

 28.2 %

  2016 6:24am



 

 28.8 %

  2016 6:13am











 MEAN CORPUSCULAR VOLUME(82.0-100.0 FL)

  85.9 FL

  2016 3:20am











 84.9 FL

  2016 7:04am



 

 84.7 FL

  2016 6:24am



 

 84.2 FL

  2016 6:13am











 89910-1: MEAN CORPUSCULAR HEMOGLOBIN(26.0-34.0 PG)

  27.1 PG

  2016 3:20am











 26.8 PG

  2016 7:04am



 

 26.4 PG

  2016 6:24am



 

 25.7 PG

  2016 6:13am











 MEAN CORPUSCULAR HGB CONC(31.5-36.5 G/DL)

  31.6 G/DL

  2016 3:20am











 31.6 G/DL

  2016 7:04am



 

 31.2 G/DL

  2016 6:24am



 

 30.6 G/DL

  2016 6:13am











 RED CELL DISTRIBUTION WIDTH(11.5-14.5 %)

  14.3 %

  2016 3:20am











 14.1 %

  2016 7:04am



 

 14.1 %

  2016 6:24am



 

 13.8 %

  2016 6:13am











 777-3: PLATELET COUNT(150-450 10E3/UL)

  193 10E3/UL

  2016 3:20am











 139 10E3/UL

  2016 7:04am



 

 151 10E3/UL

  2016 6:24am



 

 161 10E3/UL

  2016 6:13am











 MEAN PLATELET VOLUME(8.2-12.4 FL)

  11.9 FL

  2016 3:20am











 12.4 FL

  2016 7:04am



 

 12.4 FL

  2016 6:24am



 

 12.3 FL

  2016 6:13am











 770-8: NEUTROPHILS % (AUTO)(40-70 %)

  70 %

  2016 3:20am











 54 %

  2016 7:04am



 

 54 %

  2016 6:24am



 

 54 %

  2016 6:13am











 LYMPHOCYTES % (AUTO)(15-45 %)

  20 %

  2016 3:20am











 32 %

  2016 7:04am



 

 31 %

  2016 6:24am



 

 31 %

  2016 6:13am











 5905-5: MONOCYTES % (AUTO)(2-10 %)

  8 %

  2016 3:20am











 11 %

  2016 7:04am



 

 11 %

  2016 6:24am



 

 12 %

  2016 6:13am











 713-8: EOSINOPHILS % (AUTO)(0-6 %)

  1 %

  2016 3:20am











 3 %

  2016 7:04am



 

 3 %

  2016 6:24am



 

 3 %

  2016 6:13am











 706-2: BASOPHILS % (AUTO)(0-1 %)

  0 %

  2016 3:20am











 1 %

  2016 7:04am



 

 1 %

  2016 6:24am



 

 1 %

  2016 6:13am











 67544-9: IMMATURE GRANS % (AUTO)(0-0 %)

  0 %

  2016 3:20am











 0 %

  2016 7:04am



 

 0 %

  2016 6:24am



 

 0 %

  2016 6:13am











 NUCLEATED RBCS (AUTO)(0-0 %)

  0 %

  2016 3:20am











 0 %

  2016 7:04am



 

 0 %

  2016 6:24am



 

 0 %

  2016 6:13am











 751-8: NEUTROPHILS # (AUTO)(2.5-7.5 10E3/UL)

  7.0 10E3/UL

  2016 3:20am











 3.1 10E3/UL

  2016 7:04am



 

 3.8 10E3/UL

  2016 6:24am



 

 3.6 10E3/UL

  2016 6:13am











 30626-7: LYMPHOCYTES # (AUTO)(1.0-4.0 10E3/UL)

  2.0 10E3/UL

  2016 3:20am











 1.9 10E3/UL

  2016 7:04am



 

 2.2 10E3/UL

  2016 6:24am



 

 2.0 10E3/UL

  2016 6:13am











 742-7: MONOCYTES # (AUTO)(0.2-0.8 10E3/UL)

  0.8 10E3/UL

  2016 3:20am











 0.6 10E3/UL

  2016 7:04am



 

 0.8 10E3/UL

  2016 6:24am



 

 0.8 10E3/UL

  2016 6:13am











 711-2: EOSINOPHILS # (AUTO)(0.0-0.4 10E3/UL)

  0.1 10E3/UL

  2016 3:20am











 0.1 10E3/UL

  2016 7:04am



 

 0.2 10E3/UL

  2016 6:24am



 

 0.2 10E3/UL

  2016 6:13am











 704-7: BASOPHILS # (AUTO)(0.0-0.2 10E3/UL)

  0.0 10E3/UL

  2016 3:20am











 0.0 10E3/UL

  2016 7:04am



 

 0.0 10E3/UL

  2016 6:24am



 

 0.0 10E3/UL

  2016 6:13am











 IMMATURE GRANS # (AUTO)(0.0-0.0 10E3/UL)

  0.0 10E3/UL

  2016 3:20am











 0.0 10E3/UL

  2016 7:04am



 

 0.0 10E3/UL

  2016 6:24am



 

 0.0 10E3/UL

  2016 6:13am











 DIFF TYPE 

  AUTOMATED

  2016 3:20am











 AUTOMATED

  2016 7:04am



 

 AUTOMATED

  2016 6:24am



 

 AUTOMATED

  2016 6:13am











 77885-5: PROTHROMBIN TIME WITH INR 

 

 PROTHROMBIN TIME(12.1-14.0 SEC)

  14.7 SEC

  2016 3:20am



 

 07590-4: INR 

  1.20



Result Comments:



       

                INR reference interval applies to patients   

                   on anticoagulant therapy. Suggested INR   

                   therapeutic range for oral anticoagulant  

                   therapy: (Stabilized anticoagulated       

                   patients)  

  

                Routine Therapy:                  2.0 to 3.0  

                Recurrent Myocardial Infarction:  2.5 to 3.5  

                Mechanical Prosthetic Valves:     2.5 to 3.5

  2016 3:20am



 

 PARTIAL THROMBOPLASTIN TIME 

 

 PARTIAL THROMBOPLASTIN TIME(22.2-37.4 SEC)

  30.1 SEC

  2016 3:20am



 

 57878-8: D-DIMER 

 

 52252-5: D-DIMER(0.00-0.49 UG/ML)

  2.21 UG/ML

  2016 3:20am



 

 90541-8: COMPLETE METABOLIC PROFILE 

 

 GLUCOSE( MG/DL)

  183 MG/DL

  2016 3:20am











 125 MG/DL

  2016 7:04am



 

 88 MG/DL

  2016 6:24am



 

 91 MG/DL

  2016 6:13am











 BLOOD UREA NITROGEN(6-20 MG/DL)

  46 MG/DL

  2016 3:20am











 45 MG/DL

  2016 7:04am



 

 46 MG/DL

  2016 6:24am



 

 45 MG/DL

  2016 6:13am











 CREATININE(0.50-1.20 MG/DL)

  2.11 MG/DL

  2016 3:20am











 1.86 MG/DL

  2016 7:04am



 

 1.73 MG/DL

  2016 6:24am



 

 1.94 MG/DL

  2016 6:13am











 56934-9: EST GLOMERULAR FILTRATION RATE(Greater than or equal to 60)

  24



Result Comments:



If the patient is of -American descent/extraction 

multiply the eGFR value by 1.212 to obtain the actual eGFR.  

  

>=60  mg/dL    Normal  

 30-59 mg/dL    Moderate Kidney Disease  

 15-29 mg/dL    Severe Kidney Disease  

<15   mg/dL    Kidney Failure

  2016 3:20am











 28



Result Comments:



If the patient is of -American descent/extraction 

multiply the eGFR value by 1.212 to obtain the actual eGFR.  

  

>=60  mg/dL    Normal  

 30-59 mg/dL    Moderate Kidney Disease  

 15-29 mg/dL    Severe Kidney Disease  

<15   mg/dL    Kidney Failure

  2016 7:04am



 

 30



Result Comments:



If the patient is of -American descent/extraction 

multiply the eGFR value by 1.212 to obtain the actual eGFR.  

  

>=60  mg/dL    Normal  

 30-59 mg/dL    Moderate Kidney Disease  

 15-29 mg/dL    Severe Kidney Disease  

<15   mg/dL    Kidney Failure

  2016 6:24am



 

 27



Result Comments:



If the patient is of -American descent/extraction 

multiply the eGFR value by 1.212 to obtain the actual eGFR.  

  

>=60  mg/dL    Normal  

 30-59 mg/dL    Moderate Kidney Disease  

 15-29 mg/dL    Severe Kidney Disease  

<15   mg/dL    Kidney Failure

  2016 6:13am











 BUN CREATININE RATIO(10.0-20.0 RATIO)

  22.0 RATIO

  2016 3:20am











 24.0 RATIO

  2016 7:04am



 

 27.0 RATIO

  2016 6:24am



 

 23.0 RATIO

  2016 6:13am











 SODIUM(135-145 MMOL/L)

  137 MMOL/L

  2016 3:20am











 138 MMOL/L

  2016 7:04am



 

 140 MMOL/L

  2016 6:24am



 

 139 MMOL/L

  2016 6:13am











 POTASSIUM(3.6-5.0 MMOL/L)

  4.3 MMOL/L

  2016 3:20am











 3.4 MMOL/L

  2016 7:04am



 

 3.5 MMOL/L

  2016 6:24am



 

 3.4 MMOL/L

  2016 6:13am











 CHLORIDE(101-111 MMOL/L)

  106 MMOL/L

  2016 3:20am











 102 MMOL/L

  2016 7:04am



 

 102 MMOL/L

  2016 6:24am



 

 101 MMOL/L

  2016 6:13am











 2028: CO2(21-31 MMOL/L)

  24 MMOL/L

  2016 3:20am











 27 MMOL/L

  2016 7:04am



 

 31 MMOL/L

  2016 6:24am



 

 31 MMOL/L

  2016 6:13am











 ANION GAP(8-18)

  11

  2016 3:20am











 12

  2016 7:04am



 

 11

  2016 6:24am



 

 10

  2016 6:13am











 OSMO CALCULATED(270.0-290.0)

  290.4

  2016 3:20am











 288.7

  2016 7:04am



 

 290.7

  2016 6:24am



 

 288.7

  2016 6:13am











 CALCIUM(8.5-10.5 MG/DL)

  8.4 MG/DL

  2016 3:20am











 8.0 MG/DL

  2016 7:04am



 

 8.4 MG/DL

  2016 6:24am



 

 8.4 MG/DL

  2016 6:13am











 BILIRUBIN,TOTAL(0.1-1.2 MG/DL)

  0.4 MG/DL

  2016 3:20am











 0.4 MG/DL

  2016 7:04am



 

 0.2 MG/DL

  2016 6:24am



 

 0.3 MG/DL

  2016 6:13am











 ALKALINE PHOSPHATASE( IU/L)

  83 IU/L

  2016 3:20am











 70 IU/L

  2016 7:04am



 

 73 IU/L

  2016 6:24am



 

 67 IU/L

  2016 6:13am











 ASPARTATE AMINO TRANSFERASE(10-42 IU/L)

  17 IU/L

  2016 3:20am











 12 IU/L

  2016 7:04am



 

 12 IU/L

  2016 6:24am



 

 11 IU/L

  2016 6:13am











 ALANINE AMINOTRANSFERASE(10-60 IU/L)

  14 IU/L

  2016 3:20am











 11 IU/L

  2016 7:04am



 

 9 IU/L

  2016 6:24am



 

 8 IU/L

  2016 6:13am











 TOTAL PROTEIN(6.4-8.2 G/DL)

  6.8 G/DL

  2016 3:20am











 5.9 G/DL

  2016 7:04am



 

 6.5 G/DL

  2016 6:24am



 

 6.1 G/DL

  2016 6:13am











 ALBUMIN(3.5-5.5 G/DL)

  3.3 G/DL

  2016 3:20am











 2.7 G/DL

  2016 7:04am



 

 3.1 G/DL

  2016 6:24am



 

 2.8 G/DL

  2016 6:13am











 GLOBULIN(2.4-3.6)

  3.5

  2016 3:20am











 3.2

  2016 7:04am



 

 3.4

  2016 6:24am



 

 3.3

  2016 6:13am











 ALBUMIN/GLOBULIN RATIO(0.9-1.8 RATIO)

  0.9 RATIO

  2016 3:20am











 0.8 RATIO

  2016 7:04am



 

 0.9 RATIO

  2016 6:24am



 

 0.8 RATIO

  2016 6:13am











 BASIC METABOLIC PANEL 

 

 GLUCOSE( MG/DL)

  141 MG/DL

  2016 3:55pm



 

 BLOOD UREA NITROGEN(6-20 MG/DL)

  43 MG/DL

  2016 3:55pm



 

 CREATININE(0.50-1.20 MG/DL)

  1.88 MG/DL

  2016 3:55pm



 

 34352-9: EST GLOMERULAR FILTRATION RATE(Greater than or equal to 60)

  28



Result Comments:



If the patient is of -American descent/extraction 

multiply the eGFR value by 1.212 to obtain the actual eGFR.  

  

>=60  mg/dL    Normal  

 30-59 mg/dL    Moderate Kidney Disease  

 15-29 mg/dL    Severe Kidney Disease  

<15   mg/dL    Kidney Failure

  2016 3:55pm



 

 BUN CREATININE RATIO(10.0-20.0 RATIO)

  23.0 RATIO

  2016 3:55pm



 

 SODIUM(135-145 MMOL/L)

  139 MMOL/L

  2016 3:55pm



 

 POTASSIUM(3.6-5.0 MMOL/L)

  4.0 MMOL/L

  2016 3:55pm



 

 CHLORIDE(101-111 MMOL/L)

  105 MMOL/L

  2016 3:55pm



 

 2028: CO2(21-31 MMOL/L)

  26 MMOL/L

  2016 3:55pm



 

 ANION GAP(8-18)

  12

  2016 3:55pm



 

 OSMO CALCULATED(270.0-290.0)

  290.7

  2016 3:55pm



 

 CALCIUM(8.5-10.5 MG/DL)

  8.3 MG/DL

  2016 3:55pm



 

 66648-0: MAGNESIUM 

 

 50832-7: MAGNESIUM(1.8-2.5 MG/DL)

  2.1 MG/DL

  2016 7:04am



 

 86477-6: CARDIAC TROPONIN I 

 

 55853-2: CARDIAC TROPONIN I(0.01-0.04 NG/ML)

  1.07 NG/ML



Result Comments:



REFERENCE RANGES:  

    NEGATIVE                             < 0.04 NG/ML  

    POSSIBLE MYCARDIAL INVOLVEMENT     >/=0.04 NG/ML   

  

INTERPRET TROPONIN I RESULT IN LIGHT OF THE TOTAL CLINICAL 

PRESENTATION INCLUDING CLINICAL HISTORY.  ANY CONDITION 

RESULTING IN MYOCARDIAL INJURY CAN POTENTIALLY ELEVATE 

TROPONIN I LEVELS ABOVE EXPECTED NORMAL RANGES.  

  

** NOTE NEW REFERENCE RANGE **

  2016 3:20am











 1.68 NG/ML



Result Comments:



REFERENCE RANGES:  

    NEGATIVE                             < 0.04 NG/ML  

    POSSIBLE MYCARDIAL INVOLVEMENT     >/=0.04 NG/ML   

  

INTERPRET TROPONIN I RESULT IN LIGHT OF THE TOTAL CLINICAL 

PRESENTATION INCLUDING CLINICAL HISTORY.  ANY CONDITION 

RESULTING IN MYOCARDIAL INJURY CAN POTENTIALLY ELEVATE 

TROPONIN I LEVELS ABOVE EXPECTED NORMAL RANGES.  

  

** NOTE NEW REFERENCE RANGE **

  2016 9:14am



 

 1.97 NG/ML



Result Comments:



REFERENCE RANGES:  

    NEGATIVE                             < 0.04 NG/ML  

    POSSIBLE MYCARDIAL INVOLVEMENT     >/=0.04 NG/ML   

  

INTERPRET TROPONIN I RESULT IN LIGHT OF THE TOTAL CLINICAL 

PRESENTATION INCLUDING CLINICAL HISTORY.  ANY CONDITION 

RESULTING IN MYOCARDIAL INJURY CAN POTENTIALLY ELEVATE 

TROPONIN I LEVELS ABOVE EXPECTED NORMAL RANGES.  

  

** NOTE NEW REFERENCE RANGE **

  2016 3:55pm



 

 1.81 NG/ML



Result Comments:



REFERENCE RANGES:  

    NEGATIVE                             < 0.04 NG/ML  

    POSSIBLE MYCARDIAL INVOLVEMENT     >/=0.04 NG/ML   

  

INTERPRET TROPONIN I RESULT IN LIGHT OF THE TOTAL CLINICAL 

PRESENTATION INCLUDING CLINICAL HISTORY.  ANY CONDITION 

RESULTING IN MYOCARDIAL INJURY CAN POTENTIALLY ELEVATE 

TROPONIN I LEVELS ABOVE EXPECTED NORMAL RANGES.  

  

** NOTE NEW REFERENCE RANGE **

  2016 7:04am



 

 1.29 NG/ML



Result Comments:



REFERENCE RANGES:  

    NEGATIVE                             < 0.04 NG/ML  

    POSSIBLE MYCARDIAL INVOLVEMENT     >/=0.04 NG/ML   

  

INTERPRET TROPONIN I RESULT IN LIGHT OF THE TOTAL CLINICAL 

PRESENTATION INCLUDING CLINICAL HISTORY.  ANY CONDITION 

RESULTING IN MYOCARDIAL INJURY CAN POTENTIALLY ELEVATE 

TROPONIN I LEVELS ABOVE EXPECTED NORMAL RANGES.  

  

** NOTE NEW REFERENCE RANGE **

  2016 6:24am











 40094-3: BETA NATRIURETIC PEPTIDE 

 

 74568-2: BETA NATRIURETIC PEPTIDE(0-100 PG/ML)

  1002 PG/ML

  2016 3:20am















Allergies and Adverse Reactions







 Allergies and Adverse Reactions 

 

 Patient Unit Number: [id number] 









 Agent  Type  Reaction  Severity  Status

 

 PENICILLINS

  Drug Allergy

  RASH

  Moderate

  Active



 

 CODEINE

  Drug Allergy

  RASH

  Mild

  Active









Problem List







 Problem List 

 

 Visit/Account #[id number] (2016 3:02am - 2016 12:58pm) 

 

 Acute Problems:  

 

 Code/Condition  Comments  Documented Start Date  Documented Resolved Date  Code
(s)

 

  

CHF exacerbation

   

   

   

   

ICD10: I50.9 Acute exacerbation of congestive heart failure

ICD9: 428.0 Acute exacerbation of congestive heart failure

SNOMED: 71580823 Acute exacerbation of congestive heart failure



 

  

Shortness of breath

   

   

   

   

ICD10: R06.02 Shortness of breath

ICD9: 786.05 Shortness of breath

SNOMED: 483056412 Shortness of breath



 

  

CHF (congestive heart failure)

   

   

   

   

ICD10: I50.9 CHF (congestive heart failure)

ICD9: 428.0 CHF (congestive heart failure)

SNOMED: 79888477 CHF (congestive heart failure)



 

  

NSTEMI (non-ST elevated myocardial infarction)

   

   

   

   

ICD10: I21.4 Non-ST elevation (NSTEMI) myocardial infarction

ICD9: 410.70 Non-ST elevation (NSTEMI) myocardial infarction

SNOMED: 021073543 Non-ST elevation (NSTEMI) myocardial infarction



 

  

Acute kidney injury

   

   

   

   

ICD10: N17.9 Acute kidney injury

ICD9: 584.9 Acute kidney injury

SNOMED: 45678662 Acute kidney injury



 

  

Elevated troponin

   

   

   

   

ICD10: R79.89 Elevated troponin level

ICD9: 790.6 Elevated troponin level

SNOMED: 704422938 Elevated troponin level



 

 Chronic Problems:  

 

  

Anemia, chronic disease

   

   

   

   

ICD10: D63.8 Chronic disease anemia

ICD9: 285.29 Chronic disease anemia

SNOMED: 790734722 Chronic disease anemia



 

  

Diabetes mellitus

   

   

   

   

ICD10: E11.9 Diabetes mellitus

ICD9: 250.00 Diabetes mellitus

SNOMED: 06527259 Diabetes mellitus



 

 Patient Unit Number: [id number] 

 

 Chronic Problems:  

 

 Code/Condition  Comments  Documented Start Date  Documented Resolved Date  Code
(s)

 

  

New onset type 1 diabetes mellitus, uncontrolled

   

   

   

   

ICD10: E10.9 New onset type 1 diabetes mellitus, uncontrolled

ICD9: 250.03 New onset type 1 diabetes mellitus, uncontrolled

SNOMED: 614287934 New onset type 1 diabetes mellitus, uncontrolled



 

  

Hypertension

   

   

   

   

ICD10: I10 Hypertension

ICD9: 401.9 Hypertension

SNOMED: 24841971 Hypertension



 

  

Anemia

   

   

   

   

ICD10: D64.9 Anemia

ICD9: 285.9 Anemia

SNOMED: 779893329 Anemia









Plan of Care







 Plan Of Care 

 

 Visit/Account #[id number] (2016 3:02am - 2016 12:58pm) 

 

 Patient Instructions 

 

 Instructions

 

  DI for Heart Attack

Congestive Heart Failure (Alternative Therapy)

Heart Attack

Anemia

DI for Heart Failure

DI for Diabetes Type 2

DI for Shortness of Breath

Clopidogrel

Lisinopril and Hydrochlorothiazide

Spironolactone

Furosemide

 









Vital Signs







 Vital Signs 

 

 Visit/Account #[id number] (2016 3:02am - 2016 12:58pm) 

 

 Sign  First Result  Last Result  Code(s)

 

 Blood Pressure

 

  160/ 83 mm[Hg] On 2016 5:30am

  126/ 73 mm[Hg] On 2016 10:13am

  8480-6  BP Systolic



 

 Heart Rate/Pulse

 

  Pulse Rate (adult):  78 /min On 2016 5:30am

  Pulse Rate (adult):  75 /min On 2016 10:13am

  8867-4  Heart Rate

 8893-0  Pulse rate



 

 Respiratory Rate

 

  Respiratory Rate:  26 /min On 2016 5:30am

  Respiratory Rate:  18 /min On 2016 10:13am

  9279-1  Respiratory rate



 

 Temperature in Fahrenheit

 

  Temperature (Fahrenheit):  97.6 [degF] On 2016 3:01am

  Temperature (Fahrenheit):  97.8 [degF] On 2016 10:13am

  8310-5  Body Temperature



 

 Weight in Kilograms

 

  Weight (Kilograms):  80.8 kg On 2016 3:01am

   

  3141-9  Weight Measured

 46566-6  Body weight measured in kilograms









Functional Status







 Functional and Cognitive Status 









 No Functional Status Data









Medications







 Home Medications - Medications that the patient was taking prior to arrival at 
the hospital 

 

 Visit/Account #[id number] (2016 3:02am - 2016 12:58pm) 

 

 Medication  Route  Sig/Schedule  Precondition/Indication  Comments/Instructions
  Codes

 

 HUMALOG(INSULIN LISPRO) 100 U/ML INSULN.PEN

Dose: 8 U

 

  SUB-Q

  3 TIMES DAILY WITH MEALS

   

   

  3 ML Insulin Lispro 100 UNT/ML Pen Injector [Humalog] (RxNorm): 2022917

 

HUMALOG (INSULIN LISPRO) NDC: 52163612780

 



 

 ZESTORETIC 20-25 MG(LISINOPRIL/HYDROCHLOROTHIAZIDE) 1 EACH TABLET

Dose: 2 TAB

 

  ORAL

  DAILY

   

   

  Hydrochlorothiazide 25 MG / Lisinopril 20 MG Oral Tablet [Zestoretic] (RxNorm)
: 298761

 

ZESTORETIC 20-25 MG (LISINOPRIL/HYDROCHLOROTHIAZIDE) NDC: 29217482679

 



 

 FISH OIL 1,000 MG(OMEGA-3 FATTY ACIDS/FISH OIL) 1 EACH CAPSULE

Dose: 1000 MG

 

  ORAL

  DAILY

   

   

  FISH OIL 1,000 MG (OMEGA-3 FATTY ACIDS/FISH OIL) NDC: 15031711479

 



 

 REGLAN(METOCLOPRAMIDE HCL) 10 MG TABLET

Dose: 10 MG

 

  ORAL

  TWICE A DAY

   

   

  Metoclopramide 10 MG Oral Tablet [Reglan] (RxNorm): 555017

 

REGLAN (METOCLOPRAMIDE HCL) NDC: 93699523056

 



 

 ZANTAC(RANITIDINE HCL) 150 MG TABLET

Dose: 150 MG

 

  ORAL

  DAILY

   

   

  Ranitidine 150 MG Oral Tablet [Zantac] (RxNorm): 954963

 

ZANTAC (RANITIDINE HCL) NDC: 44543167581

 



 

 LEVEMIR FLEXTOUCH(INSULIN DETEMIR) 100 UNIT/1 ML INSULN.PEN

Dose: 12 UNIT

 

  SUBCUTANEOUSLY

  AT BEDTIME

   

   

  3 ML insulin detemir 100 UNT/ML Pen Injector [Levemir] (RxNorm): 818216

 

LEVEMIR FLEXTOUCH (INSULIN DETEMIR) NDC: 84687609645

 



 

 Coreg(CARVEDILOL) 25 MG TAB

Dose: 25 MG

 

  ORAL

  WITH BREAKFAST & SUPPER

   

   

  carvedilol 25 MG Oral Tablet [Coreg] (RxNorm): 819092

 

Coreg (CARVEDILOL) NDC: 25373546018

 



 

 TYLENOL(ACETAMINOPHEN) 500 MG TAB

Dose: 500 MG

 

  ORAL

  Q4H

  PAIN OR FEVER

  Rx Instructions:

 500 - 1000 MG

 

  Acetaminophen 500 MG Oral Tablet [Mapap] (RxNorm): 975985

 

TYLENOL (ACETAMINOPHEN) NDC: 85070952152

 



 

 Aspirin Chew(ASPIRIN) 81 MG TAB

Dose: 81 MG

 

  ORAL

  DAILY

   

   

  Aspirin Chew (ASPIRIN) NDC: 33146451314

 



 

 Lasix(FUROSEMIDE) 40 MG TAB

Dose: 40 MG

 

  ORAL

  DAILY

   

   

  Lasix (FUROSEMIDE) NDC: 5195925

 



 

 CRESTOR(ROSUVASTATIN CALCIUM) 10 MG TAB

Dose: 20 MG

 

  ORAL

  AT BEDTIME

   

   

  Rosuvastatin calcium 10 MG Oral Tablet [Crestor] (RxNorm): 752344

 

CRESTOR (ROSUVASTATIN CALCIUM) NDC: 47480443222

 



 

 Klor-Con M20(POTASSIUM CHLORIDE) 20 MEQ TAB.ER.PRT

Dose: 40 MEQ

 

  ORAL

  WITH BREAKFAST & SUPPER

   

   

  Potassium Chloride 20 MEQ Extended Release Oral Tablet [Klor-Con] (RxNorm): 
025798

 

Klor-Con M20 (POTASSIUM CHLORIDE) NDC: 58261983417

 



 

 TYLENOL(ACETAMINOPHEN) 500 MG TABLET

Dose: 500 MG

 

  ORAL

  Q4H

  PAIN OR FEVER

   

  TYLENOL (ACETAMINOPHEN) NDC: 45646652917

 



 

 Aspirin Ec(ASPIRIN) 81 MG TAB

Dose: 81 MG

 

  ORAL

  DAILY

   

   

  Aspirin 81 MG Delayed Release Oral Tablet (RxNorm): 997939

 

Aspirin Ec (ASPIRIN) NDC: 15211393616

 



 

 Coreg(CARVEDILOL) 25 MG TAB

Dose: 25 MG

 

  ORAL

  WITH BREAKFAST & SUPPER

   

   

  carvedilol 25 MG Oral Tablet [Coreg] (RxNorm): 886531

 

Coreg (CARVEDILOL) NDC: 03464534233

 



 

 Lasix(FUROSEMIDE) 40 MG TAB

Dose: 40 MG

 

  ORAL

  DAILY

   

   

  Lasix (FUROSEMIDE) NDC: 30304915665

 



 

 Klor-Con M20(POTASSIUM CHLORIDE) 20 MEQ TAB.ER.PRT

Dose: 40 MEQ

 

  ORAL

  WITH BREAKFAST & SUPPER

   

  Rx Instructions:

*PT STATES SHE ONLY TAKES WHEN SHE TAKES FUROSEMIDE*

 

  Potassium Chloride 20 MEQ Extended Release Oral Tablet (RxNorm): 012256

 

Klor-Con M20 (POTASSIUM CHLORIDE) NDC: 93519362882

 



 

 Crestor(ROSUVASTATIN CALCIUM) 20 MG TAB

Dose: 20 MG

 

  ORAL

  DAILY

   

  Rx Instructions:

*PT STATES SHE TAKES IN THE MORNING*

 

  Rosuvastatin calcium 20 MG Oral Tablet [Crestor] (RxNorm): 644870

 

Crestor (ROSUVASTATIN CALCIUM) NDC: 18631633031

 



 

 Plavix(CLOPIDOGREL BISULFATE) 75 MG TAB

Dose: 75 MG

 

  ORAL

  DAILY

   

   

  clopidogrel 75 MG Oral Tablet [Plavix] (RxNorm): 064495

 

Plavix (CLOPIDOGREL BISULFATE) NDC: 04837284850

 



 

 Lasix(FUROSEMIDE) 20 MG TAB

Dose: 60 MG

 

  ORAL

  DAILY

   

   

  Furosemide 20 MG Oral Tablet (RxNorm): 395261

 

Lasix (FUROSEMIDE) NDC: 83471572018

 



 

 Aldactone(SPIRONOLACTONE) 50 MG TAB

Dose: 100 MG

 

  ORAL

  DAILY

   

   

  Spironolactone 50 MG Oral Tablet (RxNorm): 990576

 

Aldactone (SPIRONOLACTONE) NDC: 55595857391

 



 

 PLAVIX(CLOPIDOGREL BISULFATE) 75 MG TAB

Dose: 75 MG

 

  ORAL

  DAILY

   

   

  clopidogrel 75 MG Oral Tablet [Plavix] (RxNorm): 924415

 

PLAVIX (CLOPIDOGREL BISULFATE) NDC: 48527567777

 













 Inpatient/Ordered Medications - Medications administered during hospital visit 

 

 Visit/Account #[id number] (2016 3:02am - 2016 12:58pm) 

 

 Medication  Route  Sig/Schedule  Precondition/Indication  Comments/Instructions
  Codes

 

 LASIX INJ(FUROSEMIDE) 40 MG/4 ML INJECTION

Dose: 4 ML

 

  INTRAVEN

  NOW

   

  Label Comments:

MAY INCREASE FALL RISK

 

  4 ML Furosemide 10 MG/ML Injection (RxNorm): 1069298

 

LASIX INJ (FUROSEMIDE) NDC: 64089710491

 



 

 ASPIRIN 324 MG TAB

Dose: 324 MG

 

  ORAL

  NOW

   

  Special Dose Instructions:

CHEW

 

  Aspirin 81 MG Chewable Tablet (RxNorm): 511309

 

 (ASPIRIN) NDC: 10877638681

 



 

 LOVENOX(ENOXAPARIN) 80 MG/0.8 ML INJECTION

Dose: 0.8 ML

 

  SUBCUTANEOUSLY

  NOW

   

  Label Comments:

INJECT SC INTO ABDOMINAL WALL ONLY.

DOSED AT __MG/KG BASED ON PT WEIGHT OF __KG AND 

ROUNDED PER

PROTOCOL.

 

  0.8 ML Enoxaparin sodium 100 MG/ML Prefilled Syringe [Lovenox] (RxNorm): 
119322

 

LOVENOX (ENOXAPARIN) NDC: 07720158466

 



 

 LASIX INJ(FUROSEMIDE) 40 MG/4 ML INJECTION

Dose: 4 ML

 

  INTRAVEN

  EVERY 8 HOURS

   

  Label Comments:

MAY INCREASE FALL RISK

 

  4 ML Furosemide 10 MG/ML Injection (RxNorm): 0690813

 

LASIX INJ (FUROSEMIDE) NDC: 84786015325

 



 

 ASPIRIN 81 MG TAB

Dose: 81 MG

 

  ORAL

  DAILY

   

   

  Aspirin 81 MG Chewable Tablet (RxNorm): 266611

 

 (ASPIRIN) NDC: 91817018715

 



 

 COREG(CARVEDILOL) 25 MG TAB

Dose: 25 MG

 

  ORAL

  WITH BREAKFAST & SUPPER

   

  Label Comments:

MAY INCREASE FALL RISK

TAKE WITH FOOD

 

  carvedilol 25 MG Oral Tablet [Coreg] (RxNorm): 523755

 

COREG (CARVEDILOL) NDC: [id number]

 



 

 REGLAN(METOCLOPRAMIDE HCL) 10 MG TAB

Dose: 10 MG

 

  ORAL

  TWICE A DAY

   

  Label Comments:

MAY INCREASE FALL RISK

 

  Metoclopramide 10 MG Oral Tablet (RxNorm): 967345

 

REGLAN (METOCLOPRAMIDE HCL) NDC: 52274477684

 



 

 PEPCID(FAMOTIDINE) 20 MG TAB

Dose: 20 MG

 

  ORAL

  DAILY

   

  Label Comments:

SUB FOR HOME MED ZANATC DAILY

 

  Famotidine 20 MG Oral Tablet (RxNorm): 696063

 

PEPCID (FAMOTIDINE) NDC: 31118657737

 



 

 CRESTOR(ROSUVASTATIN CALCIUM) 10 MG TAB

Dose: 20 MG

 

  ORAL

  AT BEDTIME

   

   

  Rosuvastatin calcium 10 MG Oral Tablet [Crestor] (RxNorm): 158406

 

CRESTOR (ROSUVASTATIN CALCIUM) NDC: 23331759759

 



 

 HumaLOG(INSULIN HUM LISPRO) 300 UNIT/3 ML INJECTION

Dose: 0 ML

 

  SUBCUTANEOUSLY

  SLIDING SCALE

  PRN Reason: HYPERGLYCEMIA

  Special Dose Instructions:

**STANDARD SCALE**

If BS less than 80 Call physician

If -200    Give 2 Units

If -250    Give 4 Units

If -300    Give 6 Units

If -350    Give 8 Units

If -400    Give 10 Units

If -450    Give 12 Units

If BS greater than 450 Call physician

DOCUMENT SITE OF INJECTION

 

  Insulin Lispro 100 UNT/ML Injectable Solution [Humalog] (RxNorm): 851907

 

HumaLOG (INSULIN HUM LISPRO) NDC: 77510121957

 



 

 LASIX INJ(FUROSEMIDE) 40 MG/4 ML INJECTION

Dose: 8 ML

 

  INTRAVEN

  EVERY 8 HOURS

   

  Rx Order Comments:

Order filed UNV:

Dose Warnings differ from 

Order filed UNV:

Dose Warnings differ from 

 

Label Comments:

MAY INCREASE FALL RISK

 

  4 ML Furosemide 10 MG/ML Injection (RxNorm): 1459818

 

LASIX INJ (FUROSEMIDE) NDC: 85986310741

 



 

 ALDACTONE(SPIRONOLACTONE) 25 MG TAB

Dose: 25 MG

 

  ORAL

  DAILY

   

  Label Comments:

MAY INCREASE FALL RISK

 

  Spironolactone 25 MG Oral Tablet (RxNorm): 786554

 

ALDACTONE (SPIRONOLACTONE) NDC: 40532325196

 



 

 LEVEMIR(INSULIN DETEMIR) 1000 UNITS/10 ML INJECTION

Dose: 0.1 ML

 

  SUBCUTANEOUSLY

  AT BEDTIME

   

  Label Comments:

Do NOT refrigerate. * Syringe expires in 24 hours.

 

  insulin detemir 100 UNT/ML Injectable Solution [Levemir] (RxNorm): 687281

 

LEVEMIR (INSULIN DETEMIR) NDC: 97669256489

 



 

 HumaLOG(INSULIN HUM LISPRO) 300 UNIT/3 ML INJECTION

Dose: 0.05 ML

 

  SUBCUTANEOUSLY

  3 TIMES DAILY WITH MEALS

   

  Label Comments:

<rapid acting insulin> *protect from light*

Give IMMEDIATELY before a meal.

*expires 28 days after dispense date*

 

  Insulin Lispro 100 UNT/ML Injectable Solution [Humalog] (RxNorm): 286136

 

HumaLOG (INSULIN HUM LISPRO) NDC: 79570415935

 



 

 LASIX INJ(FUROSEMIDE) 40 MG/4 ML INJECTION

Dose: 8 ML

 

  INTRAVEN

  BID@06,12

   

  Label Comments:

MAY INCREASE FALL RISK

 

Special Dose Instructions:

Give Lasix 80mg IV twice daily ---> 

once at 6AM and again at Noon.

 

  4 ML Furosemide 10 MG/ML Injection (RxNorm): 9830160

 

LASIX INJ (FUROSEMIDE) NDC: 12047233952

 



 

 ALDACTONE(SPIRONOLACTONE) 50 MG TAB

Dose: 50 MG

 

  ORAL

  DAILY

   

  Label Comments:

MAY INCREASE FALL RISK

 

  Spironolactone 50 MG Oral Tablet (RxNorm): 046597

 

ALDACTONE (SPIRONOLACTONE) NDC: 10078104664

 



 

 K-DUR(POTASSIUM CHLORIDE) 20 MEQ TAB

Dose: 40 MEQ

 

  ORAL

  NOW

   

  Label Comments:

TAKE WITH FOOD TO AVOID GI UPSET

 

Special Dose Instructions:

give with breakfast please, ok to break up since she has 

trouble swallowing them

 

  Potassium Chloride 20 MEQ Extended Release Oral Tablet [Klor-Con] (RxNorm): 
567083

 

K-DUR (POTASSIUM CHLORIDE) NDC: 28599911733

 



 

 PLAVIX(CLOPIDOGREL BISULFATE) 75 MG TAB

Dose: 75 MG

 

  ORAL

  DAILY

   

   

  clopidogrel 75 MG Oral Tablet [Plavix] (RxNorm): 711249

 

PLAVIX (CLOPIDOGREL BISULFATE) NDC: 81598753032

 



 

 K-DUR(POTASSIUM CHLORIDE) 20 MEQ TAB

Dose: 40 MEQ

 

  ORAL

  NOW

   

  Label Comments:

TAKE WITH FOOD TO AVOID GI UPSET

 

  Potassium Chloride 20 MEQ Extended Release Oral Tablet [Klor-Con] (RxNorm): 
993968

 

K-DUR (POTASSIUM CHLORIDE) NDC: 14726643049

 



 

 LASIX(FUROSEMIDE) 20 MG TAB

Dose: 60 MG

 

  ORAL

  DAILY

   

  Label Comments:

MAY INCREASE FALL RISK

 

  Furosemide 20 MG Oral Tablet (RxNorm): 912687

 

LASIX (FUROSEMIDE) NDC: 24247347023

 



 

 ALDACTONE(SPIRONOLACTONE) 50 MG TAB

Dose: 100 MG

 

  ORAL

  DAILY

   

  Label Comments:

MAY INCREASE FALL RISK

 

  Spironolactone 50 MG Oral Tablet (RxNorm): 306547

 

ALDACTONE (SPIRONOLACTONE) NDC: 71751427463

 



 

 ALDACTONE(SPIRONOLACTONE) 50 MG TAB

Dose: 50 MG

 

  ORAL

  ONE TIME ORDER

   

  Label Comments:

MAY INCREASE FALL RISK

 

Special Dose Instructions:

give additional 50mg this AM to make total dose of 100mg

 

  Spironolactone 50 MG Oral Tablet (RxNorm): 669099

 

ALDACTONE (SPIRONOLACTONE) NDC: 29662387348

 



 

 K-DUR(POTASSIUM CHLORIDE) 20 MEQ TAB

Dose: 40 MEQ

 

  ORAL

  NOW

   

  Label Comments:

TAKE WITH FOOD TO AVOID GI UPSET

 

Special Dose Instructions:

give with breakfast please

 

  Potassium Chloride 20 MEQ Extended Release Oral Tablet [Klor-Con] (RxNorm): 
938035

 

K-DUR (POTASSIUM CHLORIDE) NDC: 19149498864

 













 Discharge Medications - Medications that patient should continue to take. 
Review with physician 

 

 Visit/Account #[id number] (2016 3:02am - 2016 12:58pm) 

 

 Medication  Route  Sig/Schedule  Precondition/Indication  Comments/Instructions
  Codes

 

 HUMALOG(INSULIN LISPRO) 100 U/ML INSULN.PEN

Dose: 8 U

 

  SUB-Q

  3 TIMES DAILY WITH MEALS

   

   

  3 ML Insulin Lispro 100 UNT/ML Pen Injector [Humalog] (RxNorm): 7236733

 

HUMALOG (INSULIN LISPRO) NDC: 38381868560

 



 

 REGLAN(METOCLOPRAMIDE HCL) 10 MG TABLET

Dose: 10 MG

 

  ORAL

  TWICE A DAY

   

   

  Metoclopramide 10 MG Oral Tablet [Reglan] (RxNorm): 666447

 

REGLAN (METOCLOPRAMIDE HCL) NDC: 02087095143

 



 

 ZANTAC(RANITIDINE HCL) 150 MG TABLET

Dose: 150 MG

 

  ORAL

  DAILY

   

   

  Ranitidine 150 MG Oral Tablet [Zantac] (RxNorm): 048755

 

ZANTAC (RANITIDINE HCL) NDC: 30815982030

 



 

 LEVEMIR FLEXTOUCH(INSULIN DETEMIR) 100 UNIT/1 ML INSULN.PEN

Dose: 12 UNIT

 

  SUBCUTANEOUSLY

  AT BEDTIME

   

   

  3 ML insulin detemir 100 UNT/ML Pen Injector [Levemir] (RxNorm): 130638

 

LEVEMIR FLEXTOUCH (INSULIN DETEMIR) NDC: 32053021249

 



 

 TYLENOL(ACETAMINOPHEN) 500 MG TABLET

Dose: 500 MG

 

  ORAL

  Q4H

  PAIN OR FEVER

   

  TYLENOL (ACETAMINOPHEN) NDC: 77705156710

 



 

 Aspirin Ec(ASPIRIN) 81 MG TAB

Dose: 81 MG

 

  ORAL

  DAILY

   

   

  Aspirin 81 MG Delayed Release Oral Tablet (RxNorm): 986534

 

Aspirin Ec (ASPIRIN) NDC: 77944939804

 



 

 Coreg(CARVEDILOL) 25 MG TAB

Dose: 25 MG

 

  ORAL

  WITH BREAKFAST & SUPPER

   

   

  carvedilol 25 MG Oral Tablet [Coreg] (RxNorm): 394368

 

Coreg (CARVEDILOL) NDC: 12058615510

 



 

 Klor-Con M20(POTASSIUM CHLORIDE) 20 MEQ TAB.ER.PRT

Dose: 40 MEQ

 

  ORAL

  WITH BREAKFAST & SUPPER

   

  Rx Instructions:

*PT STATES SHE ONLY TAKES WHEN SHE TAKES FUROSEMIDE*

 

  Potassium Chloride 20 MEQ Extended Release Oral Tablet (RxNorm): 106677

 

Klor-Con M20 (POTASSIUM CHLORIDE) NDC: 71946789011

 



 

 Crestor(ROSUVASTATIN CALCIUM) 20 MG TAB

Dose: 20 MG

 

  ORAL

  DAILY

   

  Rx Instructions:

*PT STATES SHE TAKES IN THE MORNING*

 

  Rosuvastatin calcium 20 MG Oral Tablet [Crestor] (RxNorm): 673583

 

Crestor (ROSUVASTATIN CALCIUM) NDC: 91741652065

 



 

 Plavix(CLOPIDOGREL BISULFATE) 75 MG TAB

Dose: 75 MG

 

  ORAL

  DAILY

   

   

  clopidogrel 75 MG Oral Tablet [Plavix] (RxNorm): 944672

 

Plavix (CLOPIDOGREL BISULFATE) NDC: 89389100883

 



 

 Lasix(FUROSEMIDE) 20 MG TAB

Dose: 60 MG

 

  ORAL

  DAILY

   

   

  Furosemide 20 MG Oral Tablet (RxNorm): 023150

 

Lasix (FUROSEMIDE) NDC: 07420029336

 



 

 Aldactone(SPIRONOLACTONE) 50 MG TAB

Dose: 100 MG

 

  ORAL

  DAILY

   

   

  Spironolactone 50 MG Oral Tablet (RxNorm): 117240

 

Aldactone (SPIRONOLACTONE) NDC: 47054790611

 



 

 ZESTORETIC 20-12.5 MG(LISINOPRIL/HYDROCHLOROTHIAZIDE) 1 EACH TABLET

Dose: 1 TAB

 

  ORAL

  DAILY

   

   

  Hydrochlorothiazide 12.5 MG / Lisinopril 20 MG Oral Tablet [Zestoretic] (
RxNorm): 935523

 

ZESTORETIC 20-12.5 MG (LISINOPRIL/HYDROCHLOROTHIAZIDE) NDC: 07726789186

 



 

 PLAVIX(CLOPIDOGREL BISULFATE) 75 MG TAB

Dose: 75 MG

 

  ORAL

  DAILY

   

   

  clopidogrel 75 MG Oral Tablet [Plavix] (RxNorm): 710575

 

PLAVIX (CLOPIDOGREL BISULFATE) NDC: 17736915501

 



 

 ZESTORETIC 20-12.5 MG(LISINOPRIL/HYDROCHLOROTHIAZIDE) 1 EACH TABLET

Dose: 1 TAB

 

  ORAL

  DAILY

   

   

  Hydrochlorothiazide 12.5 MG / Lisinopril 20 MG Oral Tablet [Zestoretic] (
RxNorm): 036671

 

ZESTORETIC 20-12.5 MG (LISINOPRIL/HYDROCHLOROTHIAZIDE) NDC: 35774770344

 









History Of Encounters







 Encounters 

 

 Visit/Account #[id number] (2016 3:02am - 2016 12:58pm) 

 

 Account Status  Physican Of Record  Reason For Visit  Visit Diagnosis  Start 
Date/Time  Stop Date/Time

 

 ER

  DIMPLE GALVEZ MD

  CHF, SHORTNESS OF BREATH, NSTEMI

  Not Available

  2016 3:02am

  2016 5:17am



 

 IN

  HERMINIA MATTSON MD

  CHF, SHORTNESS OF BREATH, NSTEMI

  Not Available

  2016 4:17am

  2016 12:58pm









History of Procedures







 Procedure List 









 No procedures recorded.









Discharge Instructions







 Discharge Instructions

 

 Visit/Account #[id number] (2016 3:02am - 2016 12:58pm)

 

 DISCHARGE INSTRUCTIONS Physician Documentation

 

PROVIDER INSTRUCTIONS

 

Discharge Diet Cardiac

 

Discharge Activity/Weight Bearing Status As tolerated

 

Other Discharge Instructions Note all changes to medication

Take potassium pills only when using lasix

 

 

General Information: Pt given scale and daily weight sheet

 

Discharge Diet Cardiac

 

Discharge Activity/Weight Bearing Status As tolerated

 

Other Discharge Instructions Note all changes to medication

Take potassium pills only when using lasix

 

CARE MANAGEMENT/HOME HEALTH

 

Care Management Needs scale before discharge for daily weights

 

REASON TO CALL PROVIDER

 

 

Notify Physician if: Shortness of breath worsens Swelling in legs worsen Weight 
increasing

 

FOLLOW UP APPOINTMENTS

 

Follow Up With Dr. Graham scheduled in clinic on 16

 

 

Follow-up tests/procedures/outpatient needs:

BMP at outpatient visit to check kidney function and potassium

 









Social History







 Social History 









 No Social History Data.









Immunizations







 Immunizations

 

 Patient Unit Number: [id number]

 

 Immunizations

 

 No immunizations recorded.

## 2019-06-04 ENCOUNTER — HOSPITAL ENCOUNTER (OUTPATIENT)
Dept: HOSPITAL 75 - WOUNDCARE | Age: 59
End: 2019-06-04
Attending: SURGERY
Payer: MEDICARE

## 2019-06-04 ENCOUNTER — HOSPITAL ENCOUNTER (OUTPATIENT)
Dept: HOSPITAL 75 - LAB | Age: 59
End: 2019-06-04
Attending: SURGERY
Payer: MEDICARE

## 2019-06-04 DIAGNOSIS — L97.522: ICD-10-CM

## 2019-06-04 DIAGNOSIS — E11.621: Primary | ICD-10-CM

## 2019-06-04 DIAGNOSIS — E11.42: ICD-10-CM

## 2019-06-04 DIAGNOSIS — T25.322A: ICD-10-CM
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
## 2019-06-04 PROCEDURE — 36415 COLL VENOUS BLD VENIPUNCTURE: CPT

## 2019-06-04 PROCEDURE — 99212 OFFICE O/P EST SF 10 MIN: CPT

## 2019-06-04 PROCEDURE — 83036 HEMOGLOBIN GLYCOSYLATED A1C: CPT

## 2019-06-11 ENCOUNTER — HOSPITAL ENCOUNTER (OUTPATIENT)
Dept: HOSPITAL 75 - WOUNDCARE | Age: 59
End: 2019-06-11
Attending: SURGERY
Payer: MEDICARE
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
## 2019-06-11 DIAGNOSIS — L97.522: ICD-10-CM

## 2019-06-11 DIAGNOSIS — E11.42: ICD-10-CM

## 2019-06-11 DIAGNOSIS — T25.322A: ICD-10-CM

## 2019-06-11 DIAGNOSIS — E11.621: Primary | ICD-10-CM

## 2019-06-11 PROCEDURE — 11042 DBRDMT SUBQ TIS 1ST 20SQCM/<: CPT

## 2019-06-18 ENCOUNTER — HOSPITAL ENCOUNTER (OUTPATIENT)
Dept: HOSPITAL 75 - WOUNDCARE | Age: 59
End: 2019-06-18
Attending: SURGERY
Payer: MEDICARE

## 2019-06-18 DIAGNOSIS — E11.42: ICD-10-CM
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
## 2019-06-18 DIAGNOSIS — E11.621: Primary | ICD-10-CM

## 2019-06-18 DIAGNOSIS — T25.322A: ICD-10-CM

## 2019-06-18 DIAGNOSIS — L97.522: ICD-10-CM

## 2019-06-18 PROCEDURE — 16020 DRESS/DEBRID P-THICK BURN S: CPT

## 2019-06-25 ENCOUNTER — HOSPITAL ENCOUNTER (OUTPATIENT)
Dept: HOSPITAL 75 - WOUNDCARE | Age: 59
End: 2019-06-25
Attending: SURGERY
Payer: MEDICARE

## 2019-06-25 DIAGNOSIS — E11.42: ICD-10-CM

## 2019-06-25 DIAGNOSIS — L97.522: Primary | ICD-10-CM
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
## 2019-06-25 DIAGNOSIS — E11.621: ICD-10-CM

## 2019-06-25 DIAGNOSIS — T25.322A: ICD-10-CM

## 2019-06-25 PROCEDURE — 11042 DBRDMT SUBQ TIS 1ST 20SQCM/<: CPT

## 2019-06-25 PROCEDURE — 87070 CULTURE OTHR SPECIMN AEROBIC: CPT

## 2019-06-25 PROCEDURE — 87205 SMEAR GRAM STAIN: CPT

## 2019-06-25 PROCEDURE — 87077 CULTURE AEROBIC IDENTIFY: CPT

## 2019-07-02 ENCOUNTER — HOSPITAL ENCOUNTER (OUTPATIENT)
Dept: HOSPITAL 75 - WOUNDCARE | Age: 59
End: 2019-07-02
Attending: SURGERY
Payer: MEDICARE

## 2019-07-02 DIAGNOSIS — L97.522: ICD-10-CM

## 2019-07-02 DIAGNOSIS — T25.322A: Primary | ICD-10-CM

## 2019-07-02 DIAGNOSIS — E11.621: ICD-10-CM
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
## 2019-07-02 DIAGNOSIS — E11.42: ICD-10-CM

## 2019-07-02 PROCEDURE — 15275 SKIN SUB GRAFT FACE/NK/HF/G: CPT

## 2019-07-09 ENCOUNTER — HOSPITAL ENCOUNTER (OUTPATIENT)
Dept: HOSPITAL 75 - WOUNDCARE | Age: 59
End: 2019-07-09
Attending: SURGERY
Payer: MEDICARE

## 2019-07-09 DIAGNOSIS — N18.6: ICD-10-CM

## 2019-07-09 DIAGNOSIS — L97.522: ICD-10-CM

## 2019-07-09 DIAGNOSIS — E11.621: ICD-10-CM

## 2019-07-09 DIAGNOSIS — T25.322A: Primary | ICD-10-CM

## 2019-07-09 DIAGNOSIS — E11.22: ICD-10-CM

## 2019-07-09 DIAGNOSIS — E11.40: ICD-10-CM

## 2019-07-09 PROCEDURE — 15275 SKIN SUB GRAFT FACE/NK/HF/G: CPT

## 2019-07-23 ENCOUNTER — HOSPITAL ENCOUNTER (OUTPATIENT)
Dept: HOSPITAL 75 - WOUNDCARE | Age: 59
End: 2019-07-23
Attending: SURGERY
Payer: MEDICARE

## 2019-07-23 DIAGNOSIS — E11.22: ICD-10-CM

## 2019-07-23 DIAGNOSIS — E11.42: ICD-10-CM

## 2019-07-23 DIAGNOSIS — E11.621: Primary | ICD-10-CM

## 2019-07-23 DIAGNOSIS — E11.52: ICD-10-CM

## 2019-07-23 DIAGNOSIS — I96: ICD-10-CM

## 2019-07-23 DIAGNOSIS — L97.522: ICD-10-CM

## 2019-07-23 DIAGNOSIS — N18.6: ICD-10-CM

## 2019-07-23 DIAGNOSIS — T25.322A: ICD-10-CM

## 2019-07-23 PROCEDURE — 15275 SKIN SUB GRAFT FACE/NK/HF/G: CPT

## 2019-07-30 ENCOUNTER — HOSPITAL ENCOUNTER (OUTPATIENT)
Dept: HOSPITAL 75 - WOUNDCARE | Age: 59
End: 2019-07-30
Attending: NURSE PRACTITIONER
Payer: MEDICARE

## 2019-07-30 DIAGNOSIS — T25.321A: Primary | ICD-10-CM

## 2019-07-30 DIAGNOSIS — E11.621: ICD-10-CM

## 2019-07-30 DIAGNOSIS — I96: ICD-10-CM

## 2019-07-30 DIAGNOSIS — E11.52: ICD-10-CM

## 2019-07-30 DIAGNOSIS — L97.522: ICD-10-CM

## 2019-07-30 DIAGNOSIS — E11.42: ICD-10-CM

## 2019-07-30 DIAGNOSIS — E11.22: ICD-10-CM

## 2019-07-30 DIAGNOSIS — N18.6: ICD-10-CM

## 2019-07-30 PROCEDURE — 15275 SKIN SUB GRAFT FACE/NK/HF/G: CPT

## 2019-08-06 ENCOUNTER — HOSPITAL ENCOUNTER (OUTPATIENT)
Dept: HOSPITAL 75 - WOUNDCARE | Age: 59
End: 2019-08-06
Attending: NURSE PRACTITIONER
Payer: MEDICARE

## 2019-08-06 DIAGNOSIS — E11.22: ICD-10-CM

## 2019-08-06 DIAGNOSIS — E11.621: Primary | ICD-10-CM

## 2019-08-06 DIAGNOSIS — N18.6: ICD-10-CM

## 2019-08-06 DIAGNOSIS — L97.522: ICD-10-CM

## 2019-08-06 DIAGNOSIS — E11.42: ICD-10-CM

## 2019-08-06 DIAGNOSIS — T25.322A: ICD-10-CM

## 2019-08-06 PROCEDURE — 99212 OFFICE O/P EST SF 10 MIN: CPT

## 2021-01-01 NOTE — XMS REPORT
[facility], [facility].

 Created on: 2015



Ingrid Riojas

External Reference #: [id number]

: 1960

Sex: Female



Demographics







 Address  [address] [place]  [address]

 

 Home Phone  [phone]

 

 Preferred Language  Unknown

 

 Marital Status  Unknown

 

 Sikhism Affiliation  Unknown

 

 Race  White

 

 Ethnic Group  Not  or 





Author







 Author  Kimi Corrales

 

 [facility]  [employer]

 

 Address  Unknown

 

 Phone  Unavailable







Care Team Providers







 Care Team Member Name  Role  Phone

 

 Kimi Corrales    Unavailable



                                                                



Allergies

          No Known Allergies                                                   
                                     



Problems

          





 Problem Type  Condition  ICD-9 Code  Onset Dates  Condition Status

 

 Problem  Diabetes Mellitus Type 2, not stated as uncontrolled  250.00     
Active

 

 Problem  Hypertension, benign  401.1     Active

 

 Problem  Hypertensive retinopathy  362.11     Active



                                                                               
                             



Medications

          No Known Medications                                                 
                             



Results

          No Known Results                                                     
               



Summary Purpose

          [employer]Clinical[employer] Submission 2021